# Patient Record
Sex: MALE | Race: WHITE | NOT HISPANIC OR LATINO
[De-identification: names, ages, dates, MRNs, and addresses within clinical notes are randomized per-mention and may not be internally consistent; named-entity substitution may affect disease eponyms.]

---

## 2017-09-27 ENCOUNTER — APPOINTMENT (OUTPATIENT)
Dept: DERMATOLOGY | Facility: CLINIC | Age: 57
End: 2017-09-27
Payer: COMMERCIAL

## 2017-09-27 PROCEDURE — 99202 OFFICE O/P NEW SF 15 MIN: CPT

## 2018-05-08 ENCOUNTER — EMERGENCY (EMERGENCY)
Facility: HOSPITAL | Age: 58
LOS: 1 days | Discharge: HOME | End: 2018-05-08
Attending: EMERGENCY MEDICINE
Payer: COMMERCIAL

## 2018-05-08 VITALS
RESPIRATION RATE: 16 BRPM | HEART RATE: 50 BPM | OXYGEN SATURATION: 100 % | SYSTOLIC BLOOD PRESSURE: 167 MMHG | DIASTOLIC BLOOD PRESSURE: 107 MMHG

## 2018-05-08 VITALS — WEIGHT: 244.93 LBS | HEIGHT: 72 IN

## 2018-05-08 LAB
ALBUMIN SERPL ELPH-MCNC: 4.3 G/DL — SIGNIFICANT CHANGE UP (ref 3.3–5.2)
ALP SERPL-CCNC: 51 U/L — SIGNIFICANT CHANGE UP (ref 40–120)
ALT FLD-CCNC: 14 U/L — SIGNIFICANT CHANGE UP
ANION GAP SERPL CALC-SCNC: 11 MMOL/L — SIGNIFICANT CHANGE UP (ref 5–17)
AST SERPL-CCNC: 13 U/L — SIGNIFICANT CHANGE UP
BASOPHILS # BLD AUTO: 0 K/UL — SIGNIFICANT CHANGE UP (ref 0–0.2)
BASOPHILS NFR BLD AUTO: 0.5 % — SIGNIFICANT CHANGE UP (ref 0–2)
BILIRUB SERPL-MCNC: 1.6 MG/DL — SIGNIFICANT CHANGE UP (ref 0.4–2)
BUN SERPL-MCNC: 11 MG/DL — SIGNIFICANT CHANGE UP (ref 8–20)
CALCIUM SERPL-MCNC: 9.2 MG/DL — SIGNIFICANT CHANGE UP (ref 8.6–10.2)
CHLORIDE SERPL-SCNC: 103 MMOL/L — SIGNIFICANT CHANGE UP (ref 98–107)
CO2 SERPL-SCNC: 27 MMOL/L — SIGNIFICANT CHANGE UP (ref 22–29)
CREAT SERPL-MCNC: 0.69 MG/DL — SIGNIFICANT CHANGE UP (ref 0.5–1.3)
EOSINOPHIL # BLD AUTO: 0.1 K/UL — SIGNIFICANT CHANGE UP (ref 0–0.5)
EOSINOPHIL NFR BLD AUTO: 2.3 % — SIGNIFICANT CHANGE UP (ref 0–5)
GLUCOSE SERPL-MCNC: 88 MG/DL — SIGNIFICANT CHANGE UP (ref 70–115)
HCT VFR BLD CALC: 48.4 % — SIGNIFICANT CHANGE UP (ref 42–52)
HGB BLD-MCNC: 16.4 G/DL — SIGNIFICANT CHANGE UP (ref 14–18)
LYMPHOCYTES # BLD AUTO: 2.2 K/UL — SIGNIFICANT CHANGE UP (ref 1–4.8)
LYMPHOCYTES # BLD AUTO: 35.1 % — SIGNIFICANT CHANGE UP (ref 20–55)
MCHC RBC-ENTMCNC: 30 PG — SIGNIFICANT CHANGE UP (ref 27–31)
MCHC RBC-ENTMCNC: 33.9 G/DL — SIGNIFICANT CHANGE UP (ref 32–36)
MCV RBC AUTO: 88.6 FL — SIGNIFICANT CHANGE UP (ref 80–94)
MONOCYTES # BLD AUTO: 0.6 K/UL — SIGNIFICANT CHANGE UP (ref 0–0.8)
MONOCYTES NFR BLD AUTO: 9.8 % — SIGNIFICANT CHANGE UP (ref 3–10)
NEUTROPHILS # BLD AUTO: 3.2 K/UL — SIGNIFICANT CHANGE UP (ref 1.8–8)
NEUTROPHILS NFR BLD AUTO: 52 % — SIGNIFICANT CHANGE UP (ref 37–73)
PLATELET # BLD AUTO: 173 K/UL — SIGNIFICANT CHANGE UP (ref 150–400)
POTASSIUM SERPL-MCNC: 4.6 MMOL/L — SIGNIFICANT CHANGE UP (ref 3.5–5.3)
POTASSIUM SERPL-SCNC: 4.6 MMOL/L — SIGNIFICANT CHANGE UP (ref 3.5–5.3)
PROT SERPL-MCNC: 6.4 G/DL — LOW (ref 6.6–8.7)
RBC # BLD: 5.46 M/UL — SIGNIFICANT CHANGE UP (ref 4.6–6.2)
RBC # FLD: 13.7 % — SIGNIFICANT CHANGE UP (ref 11–15.6)
SODIUM SERPL-SCNC: 141 MMOL/L — SIGNIFICANT CHANGE UP (ref 135–145)
WBC # BLD: 6.1 K/UL — SIGNIFICANT CHANGE UP (ref 4.8–10.8)
WBC # FLD AUTO: 6.1 K/UL — SIGNIFICANT CHANGE UP (ref 4.8–10.8)

## 2018-05-08 PROCEDURE — 70450 CT HEAD/BRAIN W/O DYE: CPT

## 2018-05-08 PROCEDURE — 99284 EMERGENCY DEPT VISIT MOD MDM: CPT

## 2018-05-08 PROCEDURE — 80053 COMPREHEN METABOLIC PANEL: CPT

## 2018-05-08 PROCEDURE — 70450 CT HEAD/BRAIN W/O DYE: CPT | Mod: 26

## 2018-05-08 PROCEDURE — 96374 THER/PROPH/DIAG INJ IV PUSH: CPT

## 2018-05-08 PROCEDURE — 85027 COMPLETE CBC AUTOMATED: CPT

## 2018-05-08 PROCEDURE — 36415 COLL VENOUS BLD VENIPUNCTURE: CPT

## 2018-05-08 PROCEDURE — 99284 EMERGENCY DEPT VISIT MOD MDM: CPT | Mod: 25

## 2018-05-08 RX ORDER — KETOROLAC TROMETHAMINE 30 MG/ML
30 SYRINGE (ML) INJECTION ONCE
Qty: 0 | Refills: 0 | Status: DISCONTINUED | OUTPATIENT
Start: 2018-05-08 | End: 2018-05-08

## 2018-05-08 RX ADMIN — Medication 30 MILLIGRAM(S): at 13:50

## 2018-05-08 RX ADMIN — Medication 10 MILLIGRAM(S): at 13:50

## 2018-05-08 NOTE — ED ADULT NURSE NOTE - OBJECTIVE STATEMENT
pt presents to ED with neck pain x one week radiating into head. pt states he currently takes pradaxa,. pt states he was seen at [facility] last night and walked out. pt states "they didn't even ask me if I wanted a pillow or blanket". pt hypertensive in triage. aime visual changes,  a&ox3. will continue to monitor and reassess

## 2018-05-08 NOTE — ED PROVIDER NOTE - NEUROLOGICAL, MLM
No focal deficits, no motor or sensory deficits. Normal strength and sensation. Normal finger to nose test.

## 2018-05-08 NOTE — ED PROVIDER NOTE - OBJECTIVE STATEMENT
58 y/o M with PMHx of a-fib and HTN presents to ED c/o constant headache in the occipital region and neck pain onset 1 week ago, Describes the neck pain as a pulled muscle feeling. He states "normally I would not come to the ED for a headache but since I am on Pradaxa I wanted to get evaluated". Has been taking Ibuprofen with relief and movement makes it feel better. Denies neck or head trauma, visual changes, sensitivity to loud noises, tinnitus, photophobia, fevers, chills, numbness/weakness/tingling. Takes blood pressure medications and states his PMD states his BP has been WNL for the past few months. No smoking, EtOH use or illicit drug use. No further acute complaints at this time.

## 2018-05-08 NOTE — ED PROVIDER NOTE - CARE PLAN
Principal Discharge DX:	Acute nonintractable headache, unspecified headache type  Secondary Diagnosis:	Essential hypertension

## 2018-05-08 NOTE — ED PROVIDER NOTE - CHPI ED SYMPTOMS NEG
no blurred vision/neck or head trauma, sensitivity to loud noise, photophobia, tinnitus, fevers, chills, numbness/weakness/tingling

## 2018-05-08 NOTE — ED PROVIDER NOTE - MEDICAL DECISION MAKING DETAILS
CTH neg, pt states headache resolved, well appearing, neuro intact, and has appt tomorrow with pmd for bp mgt. Stable for dc

## 2018-05-08 NOTE — ED ADULT TRIAGE NOTE - CHIEF COMPLAINT QUOTE
Patient arrived to ED today with c/o headache for about one week and high blood pressure.  Patient states headache is about at the base of his skull and he takes blood thinners for afib and is concerned.

## 2018-05-08 NOTE — ED STATDOCS - OBJECTIVE STATEMENT
Pt. present to ED c/o persistent headache x 1 week. Pt. is on Pradaxa. Pt. also is hypertensive today. Pt. had some relief with Ibuprofen, but headache is not going away. Pt. was concerned due to the fact that he is on a blood thinner and thus came to the ED. Pt. will be sent to the main room for further evaluation.

## 2018-09-04 PROBLEM — I10 ESSENTIAL (PRIMARY) HYPERTENSION: Chronic | Status: ACTIVE | Noted: 2018-05-08

## 2018-09-04 PROBLEM — I48.91 UNSPECIFIED ATRIAL FIBRILLATION: Chronic | Status: ACTIVE | Noted: 2018-05-08

## 2018-10-10 ENCOUNTER — APPOINTMENT (OUTPATIENT)
Dept: CARDIOLOGY | Facility: CLINIC | Age: 58
End: 2018-10-10

## 2018-12-28 ENCOUNTER — APPOINTMENT (OUTPATIENT)
Dept: CARDIOLOGY | Facility: CLINIC | Age: 58
End: 2018-12-28

## 2019-11-01 ENCOUNTER — INPATIENT (INPATIENT)
Facility: HOSPITAL | Age: 59
LOS: 5 days | Discharge: HOME | DRG: 417 | End: 2019-11-07
Attending: SURGERY | Admitting: SURGERY
Payer: COMMERCIAL

## 2019-11-01 VITALS
TEMPERATURE: 97 F | DIASTOLIC BLOOD PRESSURE: 103 MMHG | HEART RATE: 53 BPM | WEIGHT: 214.95 LBS | SYSTOLIC BLOOD PRESSURE: 177 MMHG | HEIGHT: 72 IN | RESPIRATION RATE: 20 BRPM

## 2019-11-01 LAB
ALBUMIN SERPL ELPH-MCNC: 4.3 G/DL — SIGNIFICANT CHANGE UP (ref 3.3–5.2)
ALP SERPL-CCNC: 266 U/L — HIGH (ref 40–120)
ALT FLD-CCNC: 467 U/L — HIGH
ANION GAP SERPL CALC-SCNC: 13 MMOL/L — SIGNIFICANT CHANGE UP (ref 5–17)
AST SERPL-CCNC: 445 U/L — HIGH
BASOPHILS # BLD AUTO: 0.06 K/UL — SIGNIFICANT CHANGE UP (ref 0–0.2)
BASOPHILS NFR BLD AUTO: 0.5 % — SIGNIFICANT CHANGE UP (ref 0–2)
BILIRUB SERPL-MCNC: 4.4 MG/DL — HIGH (ref 0.4–2)
BUN SERPL-MCNC: 12 MG/DL — SIGNIFICANT CHANGE UP (ref 8–20)
CALCIUM SERPL-MCNC: 9.4 MG/DL — SIGNIFICANT CHANGE UP (ref 8.6–10.2)
CHLORIDE SERPL-SCNC: 99 MMOL/L — SIGNIFICANT CHANGE UP (ref 98–107)
CO2 SERPL-SCNC: 26 MMOL/L — SIGNIFICANT CHANGE UP (ref 22–29)
CREAT SERPL-MCNC: 0.67 MG/DL — SIGNIFICANT CHANGE UP (ref 0.5–1.3)
EOSINOPHIL # BLD AUTO: 0.19 K/UL — SIGNIFICANT CHANGE UP (ref 0–0.5)
EOSINOPHIL NFR BLD AUTO: 1.5 % — SIGNIFICANT CHANGE UP (ref 0–6)
GLUCOSE SERPL-MCNC: 110 MG/DL — SIGNIFICANT CHANGE UP (ref 70–115)
HCT VFR BLD CALC: 51.1 % — HIGH (ref 39–50)
HGB BLD-MCNC: 16.9 G/DL — SIGNIFICANT CHANGE UP (ref 13–17)
IMM GRANULOCYTES NFR BLD AUTO: 0.6 % — SIGNIFICANT CHANGE UP (ref 0–1.5)
LIDOCAIN IGE QN: >3000 U/L — HIGH (ref 22–51)
LYMPHOCYTES # BLD AUTO: 1.94 K/UL — SIGNIFICANT CHANGE UP (ref 1–3.3)
LYMPHOCYTES # BLD AUTO: 15.4 % — SIGNIFICANT CHANGE UP (ref 13–44)
MCHC RBC-ENTMCNC: 29.7 PG — SIGNIFICANT CHANGE UP (ref 27–34)
MCHC RBC-ENTMCNC: 33.1 GM/DL — SIGNIFICANT CHANGE UP (ref 32–36)
MCV RBC AUTO: 89.8 FL — SIGNIFICANT CHANGE UP (ref 80–100)
MONOCYTES # BLD AUTO: 1.01 K/UL — HIGH (ref 0–0.9)
MONOCYTES NFR BLD AUTO: 8 % — SIGNIFICANT CHANGE UP (ref 2–14)
NEUTROPHILS # BLD AUTO: 9.29 K/UL — HIGH (ref 1.8–7.4)
NEUTROPHILS NFR BLD AUTO: 74 % — SIGNIFICANT CHANGE UP (ref 43–77)
PLATELET # BLD AUTO: 277 K/UL — SIGNIFICANT CHANGE UP (ref 150–400)
POTASSIUM SERPL-MCNC: 4.1 MMOL/L — SIGNIFICANT CHANGE UP (ref 3.5–5.3)
POTASSIUM SERPL-SCNC: 4.1 MMOL/L — SIGNIFICANT CHANGE UP (ref 3.5–5.3)
PROT SERPL-MCNC: 7 G/DL — SIGNIFICANT CHANGE UP (ref 6.6–8.7)
RBC # BLD: 5.69 M/UL — SIGNIFICANT CHANGE UP (ref 4.2–5.8)
RBC # FLD: 13.1 % — SIGNIFICANT CHANGE UP (ref 10.3–14.5)
SODIUM SERPL-SCNC: 138 MMOL/L — SIGNIFICANT CHANGE UP (ref 135–145)
WBC # BLD: 12.56 K/UL — HIGH (ref 3.8–10.5)
WBC # FLD AUTO: 12.56 K/UL — HIGH (ref 3.8–10.5)

## 2019-11-01 PROCEDURE — 99285 EMERGENCY DEPT VISIT HI MDM: CPT

## 2019-11-01 PROCEDURE — 76705 ECHO EXAM OF ABDOMEN: CPT | Mod: 26

## 2019-11-01 RX ORDER — HYDROMORPHONE HYDROCHLORIDE 2 MG/ML
1 INJECTION INTRAMUSCULAR; INTRAVENOUS; SUBCUTANEOUS ONCE
Refills: 0 | Status: DISCONTINUED | OUTPATIENT
Start: 2019-11-01 | End: 2019-11-01

## 2019-11-01 RX ORDER — ONDANSETRON 8 MG/1
4 TABLET, FILM COATED ORAL ONCE
Refills: 0 | Status: COMPLETED | OUTPATIENT
Start: 2019-11-01 | End: 2019-11-01

## 2019-11-01 RX ORDER — MORPHINE SULFATE 50 MG/1
4 CAPSULE, EXTENDED RELEASE ORAL ONCE
Refills: 0 | Status: DISCONTINUED | OUTPATIENT
Start: 2019-11-01 | End: 2019-11-01

## 2019-11-01 RX ORDER — SODIUM CHLORIDE 9 MG/ML
1000 INJECTION INTRAMUSCULAR; INTRAVENOUS; SUBCUTANEOUS ONCE
Refills: 0 | Status: COMPLETED | OUTPATIENT
Start: 2019-11-01 | End: 2019-11-01

## 2019-11-01 RX ORDER — PIPERACILLIN AND TAZOBACTAM 4; .5 G/20ML; G/20ML
3.38 INJECTION, POWDER, LYOPHILIZED, FOR SOLUTION INTRAVENOUS ONCE
Refills: 0 | Status: COMPLETED | OUTPATIENT
Start: 2019-11-01 | End: 2019-11-01

## 2019-11-01 RX ADMIN — HYDROMORPHONE HYDROCHLORIDE 1 MILLIGRAM(S): 2 INJECTION INTRAMUSCULAR; INTRAVENOUS; SUBCUTANEOUS at 23:33

## 2019-11-01 RX ADMIN — ONDANSETRON 4 MILLIGRAM(S): 8 TABLET, FILM COATED ORAL at 22:15

## 2019-11-01 RX ADMIN — PIPERACILLIN AND TAZOBACTAM 200 GRAM(S): 4; .5 INJECTION, POWDER, LYOPHILIZED, FOR SOLUTION INTRAVENOUS at 23:33

## 2019-11-01 RX ADMIN — SODIUM CHLORIDE 1000 MILLILITER(S): 9 INJECTION INTRAMUSCULAR; INTRAVENOUS; SUBCUTANEOUS at 23:02

## 2019-11-01 RX ADMIN — MORPHINE SULFATE 4 MILLIGRAM(S): 50 CAPSULE, EXTENDED RELEASE ORAL at 22:15

## 2019-11-01 NOTE — ED STATDOCS - CLINICAL SUMMARY MEDICAL DECISION MAKING FREE TEXT BOX
60 y/o M pt presents to ED c/o RUQ ABD pain with RUQ tenderness on exam. Will check basic labs, fluids, ABD US and reeval.

## 2019-11-01 NOTE — ED STATDOCS - OBJECTIVE STATEMENT
60 y/o M pt with hx of HTN, afib; PSHx hernia repair presents to ED c/o progressive severe RUQ ABD pain, which radiates to his right flank x 6 days.  Pain worsened today and presents to ED. Had similar pain 1 month ago, which resolved without intervention. Had o/p blood work yesterday and ABD US today. No nausea, vomiting, fever or chills. No further complaints at this time.

## 2019-11-01 NOTE — ED STATDOCS - NS ED ROS FT
No fever/chills, No photophobia/eye pain/changes in vision, No ear pain/sore throat/dysphagia, No chest pain/palpitations, no SOB/cough/wheeze/stridor,  +abdominal pain, No N/V/D, no dysuria/frequency/discharge, No neck/back pain, no rash, no changes in neurological status/function.

## 2019-11-01 NOTE — ED STATDOCS - PROGRESS NOTE DETAILS
PA NOTE: PT evaluated by intake physician. HPI/PE/ROS as noted above. Will follow up plan per intake physician PA NOTE: choledocholithiasis with possible cholecystitis, attempted to consult ACS did not answer x 2 PA NOTE: gallstone pancreatitis with transaminitis, elevated bilirubin and lipase >3000, attempted to consult ACS did not answer x 2 PA NOTE: Spoke to ACS will consult patient PA NOTE: admit to surgery, will order stat ct with iv contrast to examine the extent of pancreatitis

## 2019-11-01 NOTE — ED STATDOCS - PHYSICAL EXAMINATION
Constitutional - well-developed; well nourished. Head - NCAT. Airway patent. Eyes - PERRL. CV - RRR. no murmur. no edema. Pulm - CTAB. Abd - soft, + diffuse ABD tenderness, worse in RUQ, +guarding. No rebound Neuro - A&Ox3. strength 5/5 x4. sensation intact x4. normal gait. Skin - No rash. MSK - normal ROM.

## 2019-11-01 NOTE — ED ADULT TRIAGE NOTE - CHIEF COMPLAINT QUOTE
patient states that he has had abdominal pain radiating to right flank and back  for past 2 weeks and today started having nausea with increased pain was seen by PMD

## 2019-11-01 NOTE — ED ADULT NURSE REASSESSMENT NOTE - NS ED NURSE REASSESS COMMENT FT1
Report recvd from off going RN, pt recvd sitting on chair, appears uncomfortable, pt returned to unit from test, reporting abd pain as returned and 10/10, POC discussed with pt who verbalize understanding and agree, pt awaiting test results, pt findings reported to ACP. awaiting further orders.

## 2019-11-01 NOTE — ED STATDOCS - ATTENDING CONTRIBUTION TO CARE
I performed a face to face history and physical exam of the patient and discussed their management with the resident/ACP. I reviewed the resident/ACP's note and agree with the documented findings and plan of care.    Pt with gallstone pancreatitis/choledocholithiasis.  will admit to surgery.

## 2019-11-02 DIAGNOSIS — Z79.01 LONG TERM (CURRENT) USE OF ANTICOAGULANTS: ICD-10-CM

## 2019-11-02 DIAGNOSIS — D68.62 LUPUS ANTICOAGULANT SYNDROME: ICD-10-CM

## 2019-11-02 DIAGNOSIS — K85.10 BILIARY ACUTE PANCREATITIS WITHOUT NECROSIS OR INFECTION: ICD-10-CM

## 2019-11-02 DIAGNOSIS — I48.0 PAROXYSMAL ATRIAL FIBRILLATION: ICD-10-CM

## 2019-11-02 DIAGNOSIS — R74.8 ABNORMAL LEVELS OF OTHER SERUM ENZYMES: ICD-10-CM

## 2019-11-02 LAB
ALBUMIN SERPL ELPH-MCNC: 4.1 G/DL — SIGNIFICANT CHANGE UP (ref 3.3–5.2)
ALP SERPL-CCNC: 241 U/L — HIGH (ref 40–120)
ALT FLD-CCNC: 374 U/L — HIGH
ANION GAP SERPL CALC-SCNC: 10 MMOL/L — SIGNIFICANT CHANGE UP (ref 5–17)
APTT BLD: 36.5 SEC — HIGH (ref 27.5–36.3)
APTT BLD: 45.9 SEC — HIGH (ref 27.5–36.3)
AST SERPL-CCNC: 223 U/L — HIGH
BASOPHILS # BLD AUTO: 0.03 K/UL — SIGNIFICANT CHANGE UP (ref 0–0.2)
BASOPHILS NFR BLD AUTO: 0.3 % — SIGNIFICANT CHANGE UP (ref 0–2)
BILIRUB DIRECT SERPL-MCNC: 1 MG/DL — HIGH (ref 0–0.3)
BILIRUB INDIRECT FLD-MCNC: 2.1 MG/DL — HIGH (ref 0.2–1)
BILIRUB SERPL-MCNC: 3.1 MG/DL — HIGH (ref 0.4–2)
BUN SERPL-MCNC: 11 MG/DL — SIGNIFICANT CHANGE UP (ref 8–20)
CALCIUM SERPL-MCNC: 8.8 MG/DL — SIGNIFICANT CHANGE UP (ref 8.6–10.2)
CHLORIDE SERPL-SCNC: 100 MMOL/L — SIGNIFICANT CHANGE UP (ref 98–107)
CO2 SERPL-SCNC: 28 MMOL/L — SIGNIFICANT CHANGE UP (ref 22–29)
CREAT SERPL-MCNC: 0.69 MG/DL — SIGNIFICANT CHANGE UP (ref 0.5–1.3)
EOSINOPHIL # BLD AUTO: 0.1 K/UL — SIGNIFICANT CHANGE UP (ref 0–0.5)
EOSINOPHIL NFR BLD AUTO: 0.9 % — SIGNIFICANT CHANGE UP (ref 0–6)
GLUCOSE SERPL-MCNC: 125 MG/DL — HIGH (ref 70–115)
HCT VFR BLD CALC: 47 % — SIGNIFICANT CHANGE UP (ref 39–50)
HCT VFR BLD CALC: 48.3 % — SIGNIFICANT CHANGE UP (ref 39–50)
HGB BLD-MCNC: 16 G/DL — SIGNIFICANT CHANGE UP (ref 13–17)
HGB BLD-MCNC: 16.3 G/DL — SIGNIFICANT CHANGE UP (ref 13–17)
IMM GRANULOCYTES NFR BLD AUTO: 0.6 % — SIGNIFICANT CHANGE UP (ref 0–1.5)
INR BLD: 1.08 RATIO — SIGNIFICANT CHANGE UP (ref 0.88–1.16)
LIDOCAIN IGE QN: 2416 U/L — HIGH (ref 22–51)
LYMPHOCYTES # BLD AUTO: 1.9 K/UL — SIGNIFICANT CHANGE UP (ref 1–3.3)
LYMPHOCYTES # BLD AUTO: 17.8 % — SIGNIFICANT CHANGE UP (ref 13–44)
MAGNESIUM SERPL-MCNC: 1.8 MG/DL — SIGNIFICANT CHANGE UP (ref 1.6–2.6)
MCHC RBC-ENTMCNC: 29.8 PG — SIGNIFICANT CHANGE UP (ref 27–34)
MCHC RBC-ENTMCNC: 30 PG — SIGNIFICANT CHANGE UP (ref 27–34)
MCHC RBC-ENTMCNC: 33.7 GM/DL — SIGNIFICANT CHANGE UP (ref 32–36)
MCHC RBC-ENTMCNC: 34 GM/DL — SIGNIFICANT CHANGE UP (ref 32–36)
MCV RBC AUTO: 88 FL — SIGNIFICANT CHANGE UP (ref 80–100)
MCV RBC AUTO: 88.3 FL — SIGNIFICANT CHANGE UP (ref 80–100)
MONOCYTES # BLD AUTO: 0.78 K/UL — SIGNIFICANT CHANGE UP (ref 0–0.9)
MONOCYTES NFR BLD AUTO: 7.3 % — SIGNIFICANT CHANGE UP (ref 2–14)
NEUTROPHILS # BLD AUTO: 7.8 K/UL — HIGH (ref 1.8–7.4)
NEUTROPHILS NFR BLD AUTO: 73.1 % — SIGNIFICANT CHANGE UP (ref 43–77)
PLATELET # BLD AUTO: 231 K/UL — SIGNIFICANT CHANGE UP (ref 150–400)
PLATELET # BLD AUTO: 259 K/UL — SIGNIFICANT CHANGE UP (ref 150–400)
POTASSIUM SERPL-MCNC: 4.6 MMOL/L — SIGNIFICANT CHANGE UP (ref 3.5–5.3)
POTASSIUM SERPL-SCNC: 4.6 MMOL/L — SIGNIFICANT CHANGE UP (ref 3.5–5.3)
PROT SERPL-MCNC: 6.2 G/DL — LOW (ref 6.6–8.7)
PROTHROM AB SERPL-ACNC: 12.5 SEC — SIGNIFICANT CHANGE UP (ref 10–12.9)
RBC # BLD: 5.34 M/UL — SIGNIFICANT CHANGE UP (ref 4.2–5.8)
RBC # BLD: 5.47 M/UL — SIGNIFICANT CHANGE UP (ref 4.2–5.8)
RBC # FLD: 13.2 % — SIGNIFICANT CHANGE UP (ref 10.3–14.5)
RBC # FLD: 13.2 % — SIGNIFICANT CHANGE UP (ref 10.3–14.5)
SODIUM SERPL-SCNC: 138 MMOL/L — SIGNIFICANT CHANGE UP (ref 135–145)
WBC # BLD: 10.67 K/UL — HIGH (ref 3.8–10.5)
WBC # BLD: 16.01 K/UL — HIGH (ref 3.8–10.5)
WBC # FLD AUTO: 10.67 K/UL — HIGH (ref 3.8–10.5)
WBC # FLD AUTO: 16.01 K/UL — HIGH (ref 3.8–10.5)

## 2019-11-02 PROCEDURE — 99223 1ST HOSP IP/OBS HIGH 75: CPT

## 2019-11-02 PROCEDURE — 74181 MRI ABDOMEN W/O CONTRAST: CPT | Mod: 26

## 2019-11-02 PROCEDURE — 74177 CT ABD & PELVIS W/CONTRAST: CPT | Mod: 26

## 2019-11-02 PROCEDURE — 71045 X-RAY EXAM CHEST 1 VIEW: CPT | Mod: 26

## 2019-11-02 PROCEDURE — 99222 1ST HOSP IP/OBS MODERATE 55: CPT

## 2019-11-02 PROCEDURE — 93010 ELECTROCARDIOGRAM REPORT: CPT

## 2019-11-02 RX ORDER — ONDANSETRON 8 MG/1
4 TABLET, FILM COATED ORAL EVERY 4 HOURS
Refills: 0 | Status: DISCONTINUED | OUTPATIENT
Start: 2019-11-02 | End: 2019-11-07

## 2019-11-02 RX ORDER — MAGNESIUM SULFATE 500 MG/ML
2 VIAL (ML) INJECTION ONCE
Refills: 0 | Status: COMPLETED | OUTPATIENT
Start: 2019-11-02 | End: 2019-11-03

## 2019-11-02 RX ORDER — HEPARIN SODIUM 5000 [USP'U]/ML
INJECTION INTRAVENOUS; SUBCUTANEOUS
Qty: 25000 | Refills: 0 | Status: DISCONTINUED | OUTPATIENT
Start: 2019-11-02 | End: 2019-11-04

## 2019-11-02 RX ORDER — SODIUM CHLORIDE 9 MG/ML
1000 INJECTION INTRAMUSCULAR; INTRAVENOUS; SUBCUTANEOUS ONCE
Refills: 0 | Status: DISCONTINUED | OUTPATIENT
Start: 2019-11-02 | End: 2019-11-04

## 2019-11-02 RX ORDER — ENOXAPARIN SODIUM 100 MG/ML
40 INJECTION SUBCUTANEOUS DAILY
Refills: 0 | Status: DISCONTINUED | OUTPATIENT
Start: 2019-11-02 | End: 2019-11-02

## 2019-11-02 RX ORDER — MORPHINE SULFATE 50 MG/1
1 CAPSULE, EXTENDED RELEASE ORAL EVERY 4 HOURS
Refills: 0 | Status: DISCONTINUED | OUTPATIENT
Start: 2019-11-02 | End: 2019-11-06

## 2019-11-02 RX ORDER — SODIUM CHLORIDE 9 MG/ML
1000 INJECTION, SOLUTION INTRAVENOUS
Refills: 0 | Status: DISCONTINUED | OUTPATIENT
Start: 2019-11-02 | End: 2019-11-07

## 2019-11-02 RX ORDER — MORPHINE SULFATE 50 MG/1
2 CAPSULE, EXTENDED RELEASE ORAL EVERY 4 HOURS
Refills: 0 | Status: DISCONTINUED | OUTPATIENT
Start: 2019-11-02 | End: 2019-11-07

## 2019-11-02 RX ADMIN — ONDANSETRON 4 MILLIGRAM(S): 8 TABLET, FILM COATED ORAL at 22:26

## 2019-11-02 RX ADMIN — HEPARIN SODIUM 2000 UNIT(S)/HR: 5000 INJECTION INTRAVENOUS; SUBCUTANEOUS at 18:41

## 2019-11-02 RX ADMIN — SODIUM CHLORIDE 125 MILLILITER(S): 9 INJECTION, SOLUTION INTRAVENOUS at 08:25

## 2019-11-02 RX ADMIN — MORPHINE SULFATE 2 MILLIGRAM(S): 50 CAPSULE, EXTENDED RELEASE ORAL at 09:49

## 2019-11-02 RX ADMIN — HYDROMORPHONE HYDROCHLORIDE 1 MILLIGRAM(S): 2 INJECTION INTRAMUSCULAR; INTRAVENOUS; SUBCUTANEOUS at 00:05

## 2019-11-02 RX ADMIN — HEPARIN SODIUM 1800 UNIT(S)/HR: 5000 INJECTION INTRAVENOUS; SUBCUTANEOUS at 10:45

## 2019-11-02 RX ADMIN — MORPHINE SULFATE 2 MILLIGRAM(S): 50 CAPSULE, EXTENDED RELEASE ORAL at 22:25

## 2019-11-02 RX ADMIN — ONDANSETRON 4 MILLIGRAM(S): 8 TABLET, FILM COATED ORAL at 09:49

## 2019-11-02 RX ADMIN — SODIUM CHLORIDE 125 MILLILITER(S): 9 INJECTION, SOLUTION INTRAVENOUS at 02:30

## 2019-11-02 RX ADMIN — MORPHINE SULFATE 2 MILLIGRAM(S): 50 CAPSULE, EXTENDED RELEASE ORAL at 22:49

## 2019-11-02 RX ADMIN — SODIUM CHLORIDE 125 MILLILITER(S): 9 INJECTION, SOLUTION INTRAVENOUS at 03:00

## 2019-11-02 RX ADMIN — ONDANSETRON 4 MILLIGRAM(S): 8 TABLET, FILM COATED ORAL at 03:35

## 2019-11-02 NOTE — CONSULT NOTE ADULT - ASSESSMENT
60 y/o male PMH Paroxysmal AFib, Lupus anticoagulant on Pradaxa, HLD, HTN presents to ED for RUQ pain. U/S abd shows cholelithiasis; CT abd pelvis showed acute pancreatitis,  labs lipase >3000. Possible MRCP tomorrow. Cardiology called for evaluation of Afib on Pradaxa/ risk stratification for possible lap jennifer once pancreatitis resolved.      Afib  - Paroxysmal A fib  - SB on EKG, non ischemic   - On Pradaxa for Afib and Lupus anticoagulant   - For Afib- Ok to hold Pradaxa for pending surgery and restart when able;  recommend hematology consult regarding stopping Pradaxa given + Lupus anticoagulant    Pre-operative cardiovascular risk evaluation and management.   - No cardiac symptoms. Non-ischemic ECG. METs > 4.  RCRI (revised cardiac risk index score) < 1%.   - No further cardiac work up is needed.  Benefits of surgery outweigh the risk. Further cardiac work up will not change risk of the patient. Proceed for surgery as indicated.  - Cardiologist- Dr Luigi Cade. - [facility] at [facility] approximately 1 year ago. 60 y/o male PMH Paroxysmal AFib, Lupus anticoagulant on Pradaxa, DVT, PE,  HLD, HTN presents to ED for RUQ pain. U/S abd shows cholelithiasis; CT abd pelvis showed acute pancreatitis,  labs lipase >3000. Possible MRCP tomorrow. Cardiology called for evaluation of Afib on Pradaxa/ risk stratification for possible lap jennifer once pancreatitis resolved.      Afib  - Paroxysmal A fib  - SB on EKG, non ischemic   - On Pradaxa for Afib and Lupus anticoagulant   - Continue heparin till surgery, restart pradaxa when stable      Pre-operative cardiovascular risk evaluation and management.   - No cardiac symptoms. Non-ischemic ECG. METs > 4.  RCRI (revised cardiac risk index score) < 1%.   - No further cardiac work up is needed.  Benefits of surgery outweigh the risk. Further cardiac work up will not change risk of the patient. Proceed for surgery as indicated.  - Cardiologist- Dr Luigi Cade. - [facility] at [facility] approximately 1 year ago.

## 2019-11-02 NOTE — ED ADULT NURSE REASSESSMENT NOTE - NS ED NURSE REASSESS COMMENT FT1
Report given to accepting holding room RN, POC discussed with pt who verbalize understanding and agree, pt left unit A&Ox4, offered no complaints, VSS, safety maintained.

## 2019-11-02 NOTE — CONSULT NOTE ADULT - PROBLEM SELECTOR RECOMMENDATION 9
Lab values improving; possible passed stone.  No obstruction or dilatation seen on CT.  Recommend MRCP for further evaluation.  If MRCP positive, will plan on ERCP.  Timing of cholecystectomy to be determined by surgical service.

## 2019-11-02 NOTE — CONSULT NOTE ADULT - SUBJECTIVE AND OBJECTIVE BOX
HISTORY OF PRESENT ILLNESS: This is a 59-year-old man with a past medical history significant for atrial fibrillation, lupus anticoagulant, and active tobacco use who presented with complaints of RUQ and epigastric pain that has been worsening over the past few days.  He reports similar episodes over the past few months that have self-resolved.  He reports being told he had gallstones in the past that were identified during a TTE.  He denies any fevers or chills.  He reports some nausea without vomiting.  No changes in bowel habits.  He has never undergone an EGD or colonoscopy in the past.  He underwent an ultrasound while in the ED that showed a mildly distended gallbladder, gallstones and a mildly dilated CBD.  A subsequent CT A/P showed gallstones, no CBD dilatation or choledocholithiasis and moderately severe acute pancreatitis.  His liver enzymes were grossly elevated and his lipase was >3000 on presentation.    ROS: A 14-point review of systems was completed and was otherwise negative save what was reported in the HPI.    PAST MEDICAL/SURGICAL HISTORY:  Hypertension  A-fib  No significant past surgical history    SOCIAL HISTORY:  - TOBACCO: Active every day smoker  - ALCOHOL: Denies  - ILLICIT DRUG USE: Denies    FAMILY HISTORY:  No known history of gastrointestinal or liver disease;      HOME MEDICATIONS:  Aspir 81:  (08 May 2018 09:29)  Bystolic:  (08 May 2018 09:29)  enalapril:  (08 May 2018 09:29)  Pradaxa:  (08 May 2018 09:29)    INPATIENT MEDICATIONS:  MEDICATIONS  (STANDING):  heparin  Infusion.  Unit(s)/Hr (18 mL/Hr) IV Continuous <Continuous>  lactated ringers. 1000 milliLiter(s) (125 mL/Hr) IV Continuous <Continuous>  magnesium sulfate  IVPB 2 Gram(s) IV Intermittent once  sodium chloride 0.9% Bolus 1000 milliLiter(s) IV Bolus once    MEDICATIONS  (PRN):  morphine  - Injectable 2 milliGRAM(s) IV Push every 4 hours PRN Severe Pain (7 - 10)  morphine  - Injectable 1 milliGRAM(s) IV Push every 4 hours PRN Moderate Pain (4 - 6)  ondansetron Injectable 4 milliGRAM(s) IV Push every 4 hours PRN Nausea and/or Vomiting    ALLERGIES:  No Known Allergies    T(C): 36.5 (11-02-19 @ 08:27), Max: 36.5 (11-02-19 @ 08:27)  HR: 58 (11-02-19 @ 08:27) (50 - 58)  BP: 146/88 (11-02-19 @ 08:27) (111/79 - 177/103)  RR: 18 (11-02-19 @ 08:27) (18 - 20)  SpO2: 96% (11-02-19 @ 08:27) (94% - 96%)        LABS:                        16.3   10.67 )-----------( 231      ( 02 Nov 2019 07:35 )             48.3     PT/INR - ( 02 Nov 2019 11:28 )   PT: 12.5 sec;   INR: 1.08 ratio         PTT - ( 02 Nov 2019 09:09 )  PTT:36.5 sec  11-02    138  |  100  |  11.0  ----------------------------<  125<H>  4.6   |  28.0  |  0.69    Ca    8.8      02 Nov 2019 07:35  Mg     1.8     11-02    TPro  6.2<L>  /  Alb  4.1  /  TBili  3.1<H>  /  DBili  1.0<H>  /  AST  223<H>  /  ALT  374<H>  /  AlkPhos  241<H>  11-02    LIVER FUNCTIONS - ( 02 Nov 2019 07:35 )  Alb: 4.1 g/dL / Pro: 6.2 g/dL / ALK PHOS: 241 U/L / ALT: 374 U/L / AST: 223 U/L / GGT: x             Lipase, Serum: 2416 U/L (11-02-19 @ 07:35)  Lipase, Serum: >3000 U/L (11-01-19 @ 22:19)        IMAGING: I personally reviewed the CT A/P, and I agree with the radiologist's interpretation as described below:    < from: CT Abdomen and Pelvis w/ IV Cont (11.02.19 @ 04:54) >    FINDINGS:   Lungs: Bibasilar subsegmental atelectasis.     Liver: Normal. No mass.   Gallbladder and bile ducts: Cholelithiasis. No cholecystitis or biliary ductal dilatation.   Pancreas: Diffuse peripancreatic inflammation extending into the anterior pararenal space, compatible with acute pancreatitis.   Spleen: Normal. No splenomegaly.   Adrenals: Normal. No mass.   Kidneys and ureters: Round hypodense lesion of the right kidney does not meet strict CT criteria for a simple cyst and is indeterminate, potentially a hyperdense cyst. It measures 5.2 cm and 22 Hounsfield units. Kidneys otherwise unremarkable.   Stomach and bowel: Colonic diverticulosis. No diverticulitis. No bowel wall thickening or intestinal obstruction.   Appendix: Normal appendix.   Intraperitoneal space: Unremarkable. No free air. No significant fluid collection.   Vasculature: Unremarkable. No abdominal aortic aneurysm.   Lymph nodes: Unremarkable. No enlarged lymph nodes.     Bladder: Unremarkable as visualized.   Reproductive: Prostatomegaly.   Bones/joints: Unremarkable. No acute fracture.   Soft tissues: Left inguinal hernia containing fat only.     IMPRESSION:   Diffuse peripancreatic inflammation extending into the anterior pararenal space, compatible with acute pancreatitis.    < end of copied text >

## 2019-11-02 NOTE — CONSULT NOTE ADULT - ATTENDING COMMENTS
59 y/p male with paroxysmal AFib,lupus anticoagulant, DVT x 2 ( one unprovoked) presenting with RUQ pain and diagnosis of Acute pancreatitis and choledolithiasis  Recommend bridging with IV unfractionated heparin prior to need for any invasive procedure and resume when safe from surgery/procedure standpoint. (due to multiple DVT/PE, unprovoked one time and lupus anticoagulant)   Low risk for perioperative cardiac events given good functional status , RCRI 1%

## 2019-11-02 NOTE — H&P ADULT - PROBLEM SELECTOR PLAN 1
1. Admit to ACS/Trauma under Dr. Burkett   2. Stat CT A/P with IV contrast ordered   3. NPO, IVFs and IV pain control and anti-emetics available   4. Confirm home dosages with [facility] in [facility]    5. GI following and MRCP requested 1. Admit to ACS/Trauma under Dr. Burkett   2. Stat CT A/P with IV contrast ordered   3. NPO, IVFs and IV pain control and anti-emetics available   4. Confirm home dosages with [facility] in [facility]    5. GI following and MRCP requested and ordered to be done in the AM

## 2019-11-02 NOTE — PROGRESS NOTE ADULT - SUBJECTIVE AND OBJECTIVE BOX
Still has some p[ain slightly less  afebrile  abd soft upper abd tenderness no guarding or rebound  LFTs and lipase trending down  MRCP   Supportive care

## 2019-11-02 NOTE — H&P ADULT - NSHPPHYSICALEXAM_GEN_ALL_CORE
Constitutional: Patient resting comfortably in bed in no acute distress   HEENT: EOMI/PERRL b/l with no scleral icterus   Neck: No JVD, full ROM w/o pain  Respiratory: CTAB with unlabored respirations, no accessory muscle use or conversational dyspnea   Cardiovascular: Regular rate and rhythm with no arrythmias or murmurs   Gastrointestinal: Abdomen soft, non-tender, non-distended with no rebound tenderness or guarding   Rectal: Not indicated   Neurological: GCS 15 ([id number]) with no gross sensory, motor or coordination deficits   Psychiatric: Normal mood and affect   Musculoskeletal: No joint pain, swelling or deformity with no limitation of movement

## 2019-11-02 NOTE — H&P ADULT - ASSESSMENT
59 year old with working diagnosis gallstone pancreatitis currently afebrile and hemodynamically stable pending CT A/P with IV contrast to evaluate severity of pancreatitis on imaging

## 2019-11-02 NOTE — CONSULT NOTE ADULT - GASTROINTESTINAL DETAILS
no guarding/no rebound tenderness/no rigidity/no distention/soft/no organomegaly/no masses palpable/bowel sounds normal

## 2019-11-02 NOTE — H&P ADULT - HISTORY OF PRESENT ILLNESS
59 year old male presenting with chief complaint of RUQ and epigastric pain x6 days. Reports that he had similar episode 2 months ago that lasted an hour that recurred 1 month ago and lasted 2-3hrs. Describes pain as dull, constant, non radiating pain rated 4/10 that started last Saturday prompting him to report to his PCP Tuesday who ordered labs and sent him for a RUQ U/S that he was supposed to f/u results on this coming Monday but his pain acutely worsened which resulted in him coming to [facility] ED. When evaluated by surgery team, patient had received 8mg morphine and was not reporting any pain. ACS/Trauma consulted given U/S findings of gallstones, dilated CBD and lipase >3000. ED contacted on call GI (Dr. Denis) who recommended MRCP in the AM. ROS negative for fever, chills, nausea, vomiting, chest pain, SOB, dyspnea, dysuria or changes in bowel habits.     PMHx: HTN, A fib, Dx with lupus anticoagulant during A fib admission   PSHx: R inguinal hernia repair with mesh palmira. 20yrs ago   Allergies: None   Home Meds: Pradaxa, ASA, bystolic, losartan, amlodipine and atorvastatin   FamHx: Sister diagnosed with renal CA   SocHx: Current smoker 1pack/dayx>20yrs, occasional EtOH use

## 2019-11-02 NOTE — CONSULT NOTE ADULT - SUBJECTIVE AND OBJECTIVE BOX
[facility]                                                        Office: [address] [address]                                                       Telephone: [phone]. Fax:[phone]                                                              CARDIOLOGY CONSULTATION NOTE                                                                                             Consult requested by:      Reason for Consultation:     History obtained by: Patient and medical record     obtained: No    Chief complaint:    Patient is a 59y old  Male who presents with a chief complaint of Gallstone Pancreatitis Evaluation (02 Nov 2019 11:21)      HPI: 58 y/o male PMH Paroxysmal AFib, Lupus anticoagulant on Pradaxa, HLD, HTN presents to ED for RUQ pain. U/S abd shows cholelithiasis; CT abd pelvis showed acute pancreatitis,  labs lipase >3000. Possible MRCP tomorrow. Cardiology called for evaluation of Afib on Pradaxa/ risk stratification for possible lap jennifer once pancreatitis resolved. Denies fever, chills, cough, phlegm production, shortness of breath, dyspnea on exertion, orthopnea, PND, edema, chest pain, pressure, palpitations, irregular, fast heart beat, vomiting, melena, rectal bleed, hematuria, lightheadedness, dizziness, syncope, near syncope. Denies chest pain/dyspnea with exertion. States follows with cardiologist out of [place]- Dr. Cade; had a [facility] last fall, no stents, "the blockage was under the wall not in the vessel, so did not need a stent."       REVIEW OF SYMPTOMS: Cardiovascular:  See HPI. No chest pain,  No dyspnea,  No syncope,  No palpitations, No dizziness, No Orthopnea, No Paroxsymal nocturnal dyspnea;  Respiratory:  No Dyspnea, No cough, Genitourinary:  No dysuria, no hematuria; Gastrointestinal:  No nausea, no vomiting. No diarrhea.  No dark color stool, no melena ; Neurological: No headache, no dizziness, no slurred speech;  Psychiatric: No agitation, no anxiety.  ALL OTHER REVIEW OF SYSTEMS ARE NEGATIVE.    ALLERGIES: Allergies  No Known Allergies  Intolerances      CURRENT MEDICATIONS:  heparin  Infusion.  lactated ringers.  sodium chloride 0.9% Bolus      HOME MEDICATIONS:    PAST MEDICAL HISTORY  Hypertension  A-fib  Lupus anticoagulant     PAST SURGICAL HISTORY  No significant past surgical history      FAMILY HISTORY:  Denies significant family history     SOCIAL HISTORY:   CIGARETTES: Current smoker- PPD 20 years   ALCOHOL: Denies   DRUGS: Denies     Vital Signs Last 24 Hrs  T(C): 36.5 (02 Nov 2019 08:27), Max: 36.5 (02 Nov 2019 08:27)  T(F): 97.7 (02 Nov 2019 08:27), Max: 97.7 (02 Nov 2019 08:27)  HR: 58 (02 Nov 2019 08:27) (50 - 58)  BP: 146/88 (02 Nov 2019 08:27) (111/79 - 177/103)  RR: 18 (02 Nov 2019 08:27) (18 - 20)  SpO2: 96% (02 Nov 2019 08:27) (94% - 96%)      PHYSICAL EXAM:  Constitutional: Comfortable . No acute distress.   HEENT: Atraumatic and normocephalic , neck is supple . no JVD. No carotid bruit. PEERL   CNS: A&Ox3. No focal deficits. EOMI. Cranial nerves II-IX are intact.   Lymph Nodes: Cervical : Not palpable.  Respiratory: CTAB  Cardiovascular: S1S2 RRR. No murmur/rubs or gallop.  Gastrointestinal: Soft tender RUQ, non distended . +Bowel sounds.   Extremities: No edema.   Psychiatric: Calm . no agitation.  Skin: No skin rash/ulcers visualized to face, hands or feet.      LABS:                        16.3   10.67 )-----------( 231      ( 02 Nov 2019 07:35 )             48.3     11-02    138  |  100  |  11.0  ----------------------------<  125<H>  4.6   |  28.0  |  0.69    Ca    8.8      02 Nov 2019 07:35  Mg     1.8     11-02    TPro  6.2<L>  /  Alb  4.1  /  TBili  3.1<H>  /  DBili  1.0<H>  /  AST  223<H>  /  ALT  374<H>  /  AlkPhos  241<H>  11-02      PT/INR - ( 02 Nov 2019 11:28 )   PT: 12.5 sec;   INR: 1.08 ratio    PTT - ( 02 Nov 2019 09:09 )  PTT:36.5 sec      INTERPRETATION OF TELEMETRY: Reviewed by me.   ECG: SB, NSST- T abnl       RADIOLOGY & ADDITIONAL STUDIES:    X-ray:  reviewed by me.   CT scan:   < from: CT Abdomen and Pelvis w/ IV Cont (11.02.19 @ 04:54) >  FINDINGS:   Lungs: Bibasilar subsegmental atelectasis.     Liver: Normal. No mass.   Gallbladder and bile ducts: Cholelithiasis. No cholecystitis or biliary   ductal   dilatation.   Pancreas: Diffuse peripancreatic inflammation extending into the anterior   pararenal space  , compatible with acute pancreatitis.   Spleen: Normal. No splenomegaly.   Adrenals: Normal. No mass.   Kidneys and ureters: Round hypodense lesion of the right kidney does not   meet   strict CT criteria for a simple cyst and is indeterminate, potentially a   hyperdense cyst. It measures 5.2 cm and 22 Hounsfield units. Kidneys   otherwise   unremarkable.   Stomach and bowel: Colonic diverticulosis. No diverticulitis. No bowel   wall   thickening or intestinal obstruction.   Appendix: Normal appendix.   Intraperitoneal space: Unremarkable. No free air. No significant fluid   collection.   Vasculature: Unremarkable. No abdominal aortic aneurysm.   Lymph nodes: Unremarkable. No enlarged lymph nodes.     Bladder: Unremarkable as visualized.   Reproductive: Prostatomegaly.   Bones/joints: Unremarkable. No acute fracture.   Soft tissues: Left inguinal hernia containing fat only.     IMPRESSION:   Diffuse peripancreatic inflammation extending into theanterior pararenal   space,   compatible with acute pancreatitis.        < end of copied text >    < from: US Gallbladder (11.01.19 @ 22:31) >  FINDINGS:      The gallbladder is filled with shadowing gallstones. Gallbladder wall   thickness is upper limits of normal measuring up to 2.8 mm. There is no   pericholecystic fluid. Sonographic Vega's sign is negative, however,   patient received painmedication prior to exam. The common bile duct is   mildly dilated measuring up to 7 mm.    IMPRESSION:    Cholelithiasis with upper limits wall thickness of the gallbladder. No   pericholecystic fluid. Sonographic Vega's sign is negative, however,  patient received pain medication prior to exam. Mildly dilated common   bile duct. If there is continued concern for acute cholecystitis, HIDA   scan should be considered.    < end of copied text > [facility]                                                        Office: [address] [address]                                                       Telephone: [phone]. Fax:[phone]                                                              CARDIOLOGY CONSULTATION NOTE    History obtained by: Patient and medical record     obtained: No    Chief complaint:    Patient is a 59y old  Male who presents with a chief complaint of Gallstone Pancreatitis Evaluation (02 Nov 2019 11:21)      HPI: 60 y/o male PMH Paroxysmal AFib, Lupus anticoagulant on Pradaxa, DVT, PE, HLD, HTN presents to ED for RUQ pain. U/S abd shows cholelithiasis; CT abd pelvis showed acute pancreatitis,  labs lipase >3000. Possible MRCP tomorrow. Cardiology called for evaluation of Afib on Pradaxa/ risk stratification for possible lap jennifer once pancreatitis resolved. Denies fever, chills, cough, phlegm production, shortness of breath, dyspnea on exertion, orthopnea, PND, edema, chest pain, pressure, palpitations, irregular, fast heart beat, vomiting, melena, rectal bleed, hematuria, lightheadedness, dizziness, syncope, near syncope. Denies chest pain/dyspnea with exertion. States follows with cardiologist out of [place]- Dr. Cade; had a [facility] last fall, no stents, "the blockage was under the wall not in the vessel, so did not need a stent."       REVIEW OF SYMPTOMS: Cardiovascular:  See HPI. No chest pain,  No dyspnea,  No syncope,  No palpitations, No dizziness, No Orthopnea, No Paroxsymal nocturnal dyspnea;  Respiratory:  No Dyspnea, No cough, Genitourinary:  No dysuria, no hematuria; Gastrointestinal:  No nausea, no vomiting. No diarrhea.  No dark color stool, no melena ; Neurological: No headache, no dizziness, no slurred speech;  Psychiatric: No agitation, no anxiety.  ALL OTHER REVIEW OF SYSTEMS ARE NEGATIVE.    ALLERGIES: Allergies  No Known Allergies  Intolerances      CURRENT MEDICATIONS:  heparin  Infusion.  lactated ringers.  sodium chloride 0.9% Bolus      HOME MEDICATIONS:    PAST MEDICAL HISTORY  Hypertension  A-fib  Lupus anticoagulant     PAST SURGICAL HISTORY  No significant past surgical history      FAMILY HISTORY:  Denies significant family history     SOCIAL HISTORY:   CIGARETTES: Current smoker- PPD 20 years   ALCOHOL: Denies   DRUGS: Denies     Vital Signs Last 24 Hrs  T(C): 36.5 (02 Nov 2019 08:27), Max: 36.5 (02 Nov 2019 08:27)  T(F): 97.7 (02 Nov 2019 08:27), Max: 97.7 (02 Nov 2019 08:27)  HR: 58 (02 Nov 2019 08:27) (50 - 58)  BP: 146/88 (02 Nov 2019 08:27) (111/79 - 177/103)  RR: 18 (02 Nov 2019 08:27) (18 - 20)  SpO2: 96% (02 Nov 2019 08:27) (94% - 96%)      PHYSICAL EXAM:  Constitutional: Comfortable . No acute distress.   HEENT: Atraumatic and normocephalic , neck is supple . no JVD. No carotid bruit. PEERL   CNS: A&Ox3. No focal deficits. EOMI. Cranial nerves II-IX are intact.   Lymph Nodes: Cervical : Not palpable.  Respiratory: CTAB  Cardiovascular: S1S2 RRR. No murmur/rubs or gallop.  Gastrointestinal: Soft tender RUQ, non distended . +Bowel sounds.   Extremities: No edema.   Psychiatric: Calm . no agitation.  Skin: No skin rash/ulcers visualized to face, hands or feet.      LABS:                        16.3   10.67 )-----------( 231      ( 02 Nov 2019 07:35 )             48.3     11-02    138  |  100  |  11.0  ----------------------------<  125<H>  4.6   |  28.0  |  0.69    Ca    8.8      02 Nov 2019 07:35  Mg     1.8     11-02    TPro  6.2<L>  /  Alb  4.1  /  TBili  3.1<H>  /  DBili  1.0<H>  /  AST  223<H>  /  ALT  374<H>  /  AlkPhos  241<H>  11-02      PT/INR - ( 02 Nov 2019 11:28 )   PT: 12.5 sec;   INR: 1.08 ratio    PTT - ( 02 Nov 2019 09:09 )  PTT:36.5 sec      INTERPRETATION OF TELEMETRY: Reviewed by me.   ECG: SB, NSST- T abnl       RADIOLOGY & ADDITIONAL STUDIES:    X-ray:  reviewed by me.   CT scan:   < from: CT Abdomen and Pelvis w/ IV Cont (11.02.19 @ 04:54) >  FINDINGS:   Lungs: Bibasilar subsegmental atelectasis.     Liver: Normal. No mass.   Gallbladder and bile ducts: Cholelithiasis. No cholecystitis or biliary   ductal   dilatation.   Pancreas: Diffuse peripancreatic inflammation extending into the anterior   pararenal space  , compatible with acute pancreatitis.   Spleen: Normal. No splenomegaly.   Adrenals: Normal. No mass.   Kidneys and ureters: Round hypodense lesion of the right kidney does not   meet   strict CT criteria for a simple cyst and is indeterminate, potentially a   hyperdense cyst. It measures 5.2 cm and 22 Hounsfield units. Kidneys   otherwise   unremarkable.   Stomach and bowel: Colonic diverticulosis. No diverticulitis. No bowel   wall   thickening or intestinal obstruction.   Appendix: Normal appendix.   Intraperitoneal space: Unremarkable. No free air. No significant fluid   collection.   Vasculature: Unremarkable. No abdominal aortic aneurysm.   Lymph nodes: Unremarkable. No enlarged lymph nodes.     Bladder: Unremarkable as visualized.   Reproductive: Prostatomegaly.   Bones/joints: Unremarkable. No acute fracture.   Soft tissues: Left inguinal hernia containing fat only.     IMPRESSION:   Diffuse peripancreatic inflammation extending into theanterior pararenal   space,   compatible with acute pancreatitis.        < end of copied text >    < from: US Gallbladder (11.01.19 @ 22:31) >  FINDINGS:      The gallbladder is filled with shadowing gallstones. Gallbladder wall   thickness is upper limits of normal measuring up to 2.8 mm. There is no   pericholecystic fluid. Sonographic Vega's sign is negative, however,   patient received painmedication prior to exam. The common bile duct is   mildly dilated measuring up to 7 mm.    IMPRESSION:    Cholelithiasis with upper limits wall thickness of the gallbladder. No   pericholecystic fluid. Sonographic Vega's sign is negative, however,  patient received pain medication prior to exam. Mildly dilated common   bile duct. If there is continued concern for acute cholecystitis, HIDA   scan should be considered.    < end of copied text >

## 2019-11-03 LAB
ALBUMIN SERPL ELPH-MCNC: 3.4 G/DL — SIGNIFICANT CHANGE UP (ref 3.3–5.2)
ALP SERPL-CCNC: 184 U/L — HIGH (ref 40–120)
ALT FLD-CCNC: 197 U/L — HIGH
ANION GAP SERPL CALC-SCNC: 11 MMOL/L — SIGNIFICANT CHANGE UP (ref 5–17)
APTT BLD: 107.6 SEC — HIGH (ref 27.5–36.3)
APTT BLD: 64.3 SEC — HIGH (ref 27.5–36.3)
APTT BLD: 71.4 SEC — HIGH (ref 27.5–36.3)
AST SERPL-CCNC: 62 U/L — HIGH
BASOPHILS # BLD AUTO: 0.04 K/UL — SIGNIFICANT CHANGE UP (ref 0–0.2)
BASOPHILS NFR BLD AUTO: 0.3 % — SIGNIFICANT CHANGE UP (ref 0–2)
BILIRUB DIRECT SERPL-MCNC: 0.4 MG/DL — HIGH (ref 0–0.3)
BILIRUB INDIRECT FLD-MCNC: 2.1 MG/DL — HIGH (ref 0.2–1)
BILIRUB SERPL-MCNC: 2.5 MG/DL — HIGH (ref 0.4–2)
BUN SERPL-MCNC: 7 MG/DL — LOW (ref 8–20)
CALCIUM SERPL-MCNC: 8.4 MG/DL — LOW (ref 8.6–10.2)
CHLORIDE SERPL-SCNC: 101 MMOL/L — SIGNIFICANT CHANGE UP (ref 98–107)
CO2 SERPL-SCNC: 27 MMOL/L — SIGNIFICANT CHANGE UP (ref 22–29)
CREAT SERPL-MCNC: 0.6 MG/DL — SIGNIFICANT CHANGE UP (ref 0.5–1.3)
EOSINOPHIL # BLD AUTO: 0.07 K/UL — SIGNIFICANT CHANGE UP (ref 0–0.5)
EOSINOPHIL NFR BLD AUTO: 0.5 % — SIGNIFICANT CHANGE UP (ref 0–6)
GLUCOSE SERPL-MCNC: 104 MG/DL — SIGNIFICANT CHANGE UP (ref 70–115)
HCT VFR BLD CALC: 41.6 % — SIGNIFICANT CHANGE UP (ref 39–50)
HCV AB S/CO SERPL IA: 0.18 S/CO — SIGNIFICANT CHANGE UP (ref 0–0.99)
HCV AB SERPL-IMP: SIGNIFICANT CHANGE UP
HGB BLD-MCNC: 13.9 G/DL — SIGNIFICANT CHANGE UP (ref 13–17)
IMM GRANULOCYTES NFR BLD AUTO: 0.6 % — SIGNIFICANT CHANGE UP (ref 0–1.5)
LYMPHOCYTES # BLD AUTO: 15.9 % — SIGNIFICANT CHANGE UP (ref 13–44)
LYMPHOCYTES # BLD AUTO: 2.03 K/UL — SIGNIFICANT CHANGE UP (ref 1–3.3)
MAGNESIUM SERPL-MCNC: 1.6 MG/DL — SIGNIFICANT CHANGE UP (ref 1.6–2.6)
MCHC RBC-ENTMCNC: 29.4 PG — SIGNIFICANT CHANGE UP (ref 27–34)
MCHC RBC-ENTMCNC: 33.4 GM/DL — SIGNIFICANT CHANGE UP (ref 32–36)
MCV RBC AUTO: 88.1 FL — SIGNIFICANT CHANGE UP (ref 80–100)
MONOCYTES # BLD AUTO: 1.18 K/UL — HIGH (ref 0–0.9)
MONOCYTES NFR BLD AUTO: 9.2 % — SIGNIFICANT CHANGE UP (ref 2–14)
NEUTROPHILS # BLD AUTO: 9.37 K/UL — HIGH (ref 1.8–7.4)
NEUTROPHILS NFR BLD AUTO: 73.5 % — SIGNIFICANT CHANGE UP (ref 43–77)
PHOSPHATE SERPL-MCNC: 2.6 MG/DL — SIGNIFICANT CHANGE UP (ref 2.4–4.7)
PLATELET # BLD AUTO: 193 K/UL — SIGNIFICANT CHANGE UP (ref 150–400)
POTASSIUM SERPL-MCNC: 3 MMOL/L — LOW (ref 3.5–5.3)
POTASSIUM SERPL-SCNC: 3 MMOL/L — LOW (ref 3.5–5.3)
PROT SERPL-MCNC: 5.5 G/DL — LOW (ref 6.6–8.7)
RBC # BLD: 4.72 M/UL — SIGNIFICANT CHANGE UP (ref 4.2–5.8)
RBC # FLD: 13.2 % — SIGNIFICANT CHANGE UP (ref 10.3–14.5)
SODIUM SERPL-SCNC: 139 MMOL/L — SIGNIFICANT CHANGE UP (ref 135–145)
WBC # BLD: 12.77 K/UL — HIGH (ref 3.8–10.5)
WBC # FLD AUTO: 12.77 K/UL — HIGH (ref 3.8–10.5)

## 2019-11-03 PROCEDURE — 99232 SBSQ HOSP IP/OBS MODERATE 35: CPT

## 2019-11-03 PROCEDURE — 99233 SBSQ HOSP IP/OBS HIGH 50: CPT

## 2019-11-03 RX ORDER — INDOMETHACIN 50 MG
100 CAPSULE ORAL ONCE
Refills: 0 | Status: COMPLETED | OUTPATIENT
Start: 2019-11-04 | End: 2019-11-05

## 2019-11-03 RX ORDER — POTASSIUM CHLORIDE 20 MEQ
10 PACKET (EA) ORAL
Refills: 0 | Status: COMPLETED | OUTPATIENT
Start: 2019-11-03 | End: 2019-11-03

## 2019-11-03 RX ORDER — IBUPROFEN 200 MG
600 TABLET ORAL ONCE
Refills: 0 | Status: COMPLETED | OUTPATIENT
Start: 2019-11-03 | End: 2019-11-03

## 2019-11-03 RX ORDER — MORPHINE SULFATE 50 MG/1
0.5 CAPSULE, EXTENDED RELEASE ORAL EVERY 4 HOURS
Refills: 0 | Status: DISCONTINUED | OUTPATIENT
Start: 2019-11-03 | End: 2019-11-07

## 2019-11-03 RX ADMIN — MORPHINE SULFATE 1 MILLIGRAM(S): 50 CAPSULE, EXTENDED RELEASE ORAL at 00:02

## 2019-11-03 RX ADMIN — HEPARIN SODIUM 2000 UNIT(S)/HR: 5000 INJECTION INTRAVENOUS; SUBCUTANEOUS at 02:12

## 2019-11-03 RX ADMIN — MORPHINE SULFATE 1 MILLIGRAM(S): 50 CAPSULE, EXTENDED RELEASE ORAL at 23:20

## 2019-11-03 RX ADMIN — SODIUM CHLORIDE 125 MILLILITER(S): 9 INJECTION, SOLUTION INTRAVENOUS at 05:51

## 2019-11-03 RX ADMIN — Medication 100 MILLIEQUIVALENT(S): at 20:24

## 2019-11-03 RX ADMIN — HEPARIN SODIUM 1800 UNIT(S)/HR: 5000 INJECTION INTRAVENOUS; SUBCUTANEOUS at 11:40

## 2019-11-03 RX ADMIN — Medication 600 MILLIGRAM(S): at 01:05

## 2019-11-03 RX ADMIN — MORPHINE SULFATE 1 MILLIGRAM(S): 50 CAPSULE, EXTENDED RELEASE ORAL at 23:35

## 2019-11-03 RX ADMIN — Medication 100 MILLIEQUIVALENT(S): at 17:34

## 2019-11-03 RX ADMIN — SODIUM CHLORIDE 125 MILLILITER(S): 9 INJECTION, SOLUTION INTRAVENOUS at 17:34

## 2019-11-03 RX ADMIN — Medication 100 MILLIEQUIVALENT(S): at 18:34

## 2019-11-03 RX ADMIN — ONDANSETRON 4 MILLIGRAM(S): 8 TABLET, FILM COATED ORAL at 23:20

## 2019-11-03 RX ADMIN — Medication 50 GRAM(S): at 07:32

## 2019-11-03 RX ADMIN — SODIUM CHLORIDE 125 MILLILITER(S): 9 INJECTION, SOLUTION INTRAVENOUS at 00:08

## 2019-11-03 NOTE — PROGRESS NOTE ADULT - SUBJECTIVE AND OBJECTIVE BOX
INTERVAL HPI/OVERNIGHT EVENTS:    No acute events overnight. Patient evaluated at bedside and found hemodynamically stable and in no acute distress. Patient reports feeling uncomfortable and having some pain to RUQ, denies nausea vomiting. Patient currently NPO and on a heparin drip. MRCP showed 2mm stone/sludge on CBD.     SUBJECTIVE:      MEDICATIONS  (STANDING):  heparin  Infusion.  Unit(s)/Hr (18 mL/Hr) IV Continuous <Continuous>  lactated ringers. 1000 milliLiter(s) (125 mL/Hr) IV Continuous <Continuous>  magnesium sulfate  IVPB 2 Gram(s) IV Intermittent once  sodium chloride 0.9% Bolus 1000 milliLiter(s) IV Bolus once    MEDICATIONS  (PRN):  morphine  - Injectable 2 milliGRAM(s) IV Push every 4 hours PRN Severe Pain (7 - 10)  morphine  - Injectable 1 milliGRAM(s) IV Push every 4 hours PRN Moderate Pain (4 - 6)  ondansetron Injectable 4 milliGRAM(s) IV Push every 4 hours PRN Nausea and/or Vomiting      Vital Signs Last 24 Hrs  T(C): 37.7 (02 Nov 2019 22:25), Max: 37.7 (02 Nov 2019 22:25)  T(F): 99.8 (02 Nov 2019 22:25), Max: 99.8 (02 Nov 2019 22:25)  HR: 93 (02 Nov 2019 22:25) (50 - 93)  BP: 123/80 (02 Nov 2019 22:25) (111/79 - 146/88)  BP(mean): --  RR: 18 (02 Nov 2019 22:25) (18 - 18)  SpO2: 94% (02 Nov 2019 22:25) (94% - 96%)    PHYSICAL EXAM:    General: no acute distress  Chest: nonlabored breathing   Abdomen: non-distended soft and depressible, tenderness to palpation on RUQ, (+) pineda, no rebound or guarding          I&O's Detail    02 Nov 2019 08:01  -  03 Nov 2019 01:04  --------------------------------------------------------  IN:    Sodium Chloride 0.9% IV Bolus: 250 mL  Total IN: 250 mL    OUT:  Total OUT: 0 mL    Total NET: 250 mL          LABS:                        16.0   16.01 )-----------( 259      ( 02 Nov 2019 19:45 )             47.0     11-02    138  |  100  |  11.0  ----------------------------<  125<H>  4.6   |  28.0  |  0.69    Ca    8.8      02 Nov 2019 07:35  Mg     1.8     11-02    TPro  6.2<L>  /  Alb  4.1  /  TBili  3.1<H>  /  DBili  1.0<H>  /  AST  223<H>  /  ALT  374<H>  /  AlkPhos  241<H>  11-02    PT/INR - ( 02 Nov 2019 11:28 )   PT: 12.5 sec;   INR: 1.08 ratio         PTT - ( 02 Nov 2019 16:41 )  PTT:45.9 sec      RADIOLOGY & ADDITIONAL STUDIES:  MRCP 11/2/19:   IMPRESSION:     No intrahepatic or extrahepatic biliary ductal dilatation.     2 mm stone or sludge ball in the common bile duct.     Gallstones.     Findings compatible with pancreatitis.

## 2019-11-03 NOTE — PROGRESS NOTE ADULT - SUBJECTIVE AND OBJECTIVE BOX
Chief Complaint: This is a 59y old Male patient being seen for follow-up consultation for gallstone pancreatitis	.    HPI / 24H events:  Patient reports feeling somewhat better overall.  Still experiencing fairly significant epigastric pain, especially with movement.  MRCP positive for choledocholithiasis.    ROS: A 14-point review of systems was reviewed and was otherwise negative save what was reported in the HPI.    PAST MEDICAL/SURGICAL HISTORY:  Hypertension  A-fib  No significant past surgical history    MEDICATIONS  (STANDING):  heparin  Infusion.  Unit(s)/Hr (18 mL/Hr) IV Continuous <Continuous>  lactated ringers. 1000 milliLiter(s) (125 mL/Hr) IV Continuous <Continuous>  potassium chloride  10 mEq/100 mL IVPB 10 milliEquivalent(s) IV Intermittent every 1 hour  sodium chloride 0.9% Bolus 1000 milliLiter(s) IV Bolus once    MEDICATIONS  (PRN):  morphine  - Injectable 0.5 milliGRAM(s) IV Push every 4 hours PRN Mild Pain (1 - 3)  morphine  - Injectable 2 milliGRAM(s) IV Push every 4 hours PRN Severe Pain (7 - 10)  morphine  - Injectable 1 milliGRAM(s) IV Push every 4 hours PRN Moderate Pain (4 - 6)  ondansetron Injectable 4 milliGRAM(s) IV Push every 4 hours PRN Nausea and/or Vomiting    T(C): 37.1 (11-03-19 @ 16:29), Max: 38.2 (11-03-19 @ 00:00)  HR: 65 (11-03-19 @ 16:29) (65 - 93)  BP: 153/89 (11-03-19 @ 16:29) (116/71 - 153/89)  RR: 18 (11-03-19 @ 16:29) (18 - 20)  SpO2: 98% (11-03-19 @ 16:29) (92% - 98%)    I&O's Summary    02 Nov 2019 08:01  -  03 Nov 2019 07:00  --------------------------------------------------------  IN: 250 mL / OUT: 0 mL / NET: 250 mL    03 Nov 2019 07:01  -  03 Nov 2019 19:25  --------------------------------------------------------  IN: 1581 mL / OUT: 2 mL / NET: 1579 mL                              13.9   12.77 )-----------( 193      ( 03 Nov 2019 06:13 )             41.6     11-03    139  |  101  |  7.0<L>  ----------------------------<  104  3.0<L>   |  27.0  |  0.60    Ca    8.4<L>      03 Nov 2019 06:13  Phos  2.6     11-03  Mg     1.6     11-03    TPro  5.5<L>  /  Alb  3.4  /  TBili  2.5<H>  /  DBili  0.4<H>  /  AST  62<H>  /  ALT  197<H>  /  AlkPhos  184<H>  11-03    LIVER FUNCTIONS - ( 03 Nov 2019 06:13 )  Alb: 3.4 g/dL / Pro: 5.5 g/dL / ALK PHOS: 184 U/L / ALT: 197 U/L / AST: 62 U/L / GGT: x             Lipase, Serum: 2416 U/L (11-02-19 @ 07:35)  Lipase, Serum: >3000 U/L (11-01-19 @ 22:19)    PT/INR - ( 02 Nov 2019 11:28 )   PT: 12.5 sec;   INR: 1.08 ratio         PTT - ( 03 Nov 2019 17:27 )  PTT:71.4 sec    IMAGING: I personally reviewed the MRCP, and I agree with the radiologist's interpretation as described below:    < from: MR MRCP No Cont (11.02.19 @ 17:29) >  Findings:    The liver demonstrates normal signal. There are scattered subcentimeter   hyperintensities in the liver.    Intrahepatic and extrahepatic bile ducts are normal in caliber. Common   bile duct measures 4 mm. There is a 2 mm stone or sludge ball in the   distal common bile duct.    The gallbladder contains gallstones.    The pancreas has heterogeneous signal intensity with peripancreatic edema   compatible with pancreatitis. Trace left upper quadrant ascites.    The spleen is normal.     The adrenal glands are normal.      The kidneys demonstrate probable bilateral renal cysts measuring up to   5.4 cm on the right.    Lymph nodes are normal in size.        IMPRESSION:     No intrahepatic or extrahepatic biliary ductal dilatation.     2 mm stone or sludge ball in the common bile duct.    Gallstones.    Findings compatible with pancreatitis.     < end of copied text > Breath Sounds equal & clear to percussion & auscultation, no accessory muscle use

## 2019-11-03 NOTE — PROGRESS NOTE ADULT - ASSESSMENT
59 year old with working diagnosis gallstone pancreatitis currently afebrile and hemodynamically stable MRCP showing choledocholithiasis  -Heparin Nomogram  -LFT's AM  -Continue NPO  -LOLA  -GI: ERCP 11/4  -Cardiology: continue heparin drip, restart pradaxa when stable

## 2019-11-04 ENCOUNTER — TRANSCRIPTION ENCOUNTER (OUTPATIENT)
Age: 59
End: 2019-11-04

## 2019-11-04 LAB
ALBUMIN SERPL ELPH-MCNC: 3.3 G/DL — SIGNIFICANT CHANGE UP (ref 3.3–5.2)
ALP SERPL-CCNC: 152 U/L — HIGH (ref 40–120)
ALT FLD-CCNC: 118 U/L — HIGH
ANION GAP SERPL CALC-SCNC: 14 MMOL/L — SIGNIFICANT CHANGE UP (ref 5–17)
APTT BLD: 27.9 SEC — SIGNIFICANT CHANGE UP (ref 27.5–36.3)
APTT BLD: 28.5 SEC — SIGNIFICANT CHANGE UP (ref 27.5–36.3)
APTT BLD: 39.5 SEC — HIGH (ref 27.5–36.3)
AST SERPL-CCNC: 24 U/L — SIGNIFICANT CHANGE UP
BILIRUB DIRECT SERPL-MCNC: 0.4 MG/DL — HIGH (ref 0–0.3)
BILIRUB INDIRECT FLD-MCNC: 2 MG/DL — HIGH (ref 0.2–1)
BILIRUB SERPL-MCNC: 2.4 MG/DL — HIGH (ref 0.4–2)
BLD GP AB SCN SERPL QL: SIGNIFICANT CHANGE UP
BUN SERPL-MCNC: 6 MG/DL — LOW (ref 8–20)
CALCIUM SERPL-MCNC: 8.5 MG/DL — LOW (ref 8.6–10.2)
CHLORIDE SERPL-SCNC: 100 MMOL/L — SIGNIFICANT CHANGE UP (ref 98–107)
CO2 SERPL-SCNC: 25 MMOL/L — SIGNIFICANT CHANGE UP (ref 22–29)
CREAT SERPL-MCNC: 0.53 MG/DL — SIGNIFICANT CHANGE UP (ref 0.5–1.3)
GLUCOSE SERPL-MCNC: 96 MG/DL — SIGNIFICANT CHANGE UP (ref 70–115)
HCT VFR BLD CALC: 39.4 % — SIGNIFICANT CHANGE UP (ref 39–50)
HCT VFR BLD CALC: 40.2 % — SIGNIFICANT CHANGE UP (ref 39–50)
HGB BLD-MCNC: 13.5 G/DL — SIGNIFICANT CHANGE UP (ref 13–17)
HGB BLD-MCNC: 13.5 G/DL — SIGNIFICANT CHANGE UP (ref 13–17)
INR BLD: 1.19 RATIO — HIGH (ref 0.88–1.16)
INR BLD: 1.24 RATIO — HIGH (ref 0.88–1.16)
MAGNESIUM SERPL-MCNC: 1.7 MG/DL — SIGNIFICANT CHANGE UP (ref 1.6–2.6)
MCHC RBC-ENTMCNC: 30 PG — SIGNIFICANT CHANGE UP (ref 27–34)
MCHC RBC-ENTMCNC: 30.2 PG — SIGNIFICANT CHANGE UP (ref 27–34)
MCHC RBC-ENTMCNC: 33.6 GM/DL — SIGNIFICANT CHANGE UP (ref 32–36)
MCHC RBC-ENTMCNC: 34.3 GM/DL — SIGNIFICANT CHANGE UP (ref 32–36)
MCV RBC AUTO: 88.1 FL — SIGNIFICANT CHANGE UP (ref 80–100)
MCV RBC AUTO: 89.3 FL — SIGNIFICANT CHANGE UP (ref 80–100)
PHOSPHATE SERPL-MCNC: 2.3 MG/DL — LOW (ref 2.4–4.7)
PLATELET # BLD AUTO: 180 K/UL — SIGNIFICANT CHANGE UP (ref 150–400)
PLATELET # BLD AUTO: 180 K/UL — SIGNIFICANT CHANGE UP (ref 150–400)
POTASSIUM SERPL-MCNC: 3.3 MMOL/L — LOW (ref 3.5–5.3)
POTASSIUM SERPL-SCNC: 3.3 MMOL/L — LOW (ref 3.5–5.3)
PROT SERPL-MCNC: 5.7 G/DL — LOW (ref 6.6–8.7)
PROTHROM AB SERPL-ACNC: 13.7 SEC — HIGH (ref 10–12.9)
PROTHROM AB SERPL-ACNC: 14.3 SEC — HIGH (ref 10–12.9)
RBC # BLD: 4.47 M/UL — SIGNIFICANT CHANGE UP (ref 4.2–5.8)
RBC # BLD: 4.5 M/UL — SIGNIFICANT CHANGE UP (ref 4.2–5.8)
RBC # FLD: 13.1 % — SIGNIFICANT CHANGE UP (ref 10.3–14.5)
RBC # FLD: 13.1 % — SIGNIFICANT CHANGE UP (ref 10.3–14.5)
SODIUM SERPL-SCNC: 139 MMOL/L — SIGNIFICANT CHANGE UP (ref 135–145)
WBC # BLD: 11.11 K/UL — HIGH (ref 3.8–10.5)
WBC # BLD: 11.19 K/UL — HIGH (ref 3.8–10.5)
WBC # FLD AUTO: 11.11 K/UL — HIGH (ref 3.8–10.5)
WBC # FLD AUTO: 11.19 K/UL — HIGH (ref 3.8–10.5)

## 2019-11-04 PROCEDURE — 99233 SBSQ HOSP IP/OBS HIGH 50: CPT

## 2019-11-04 PROCEDURE — 99231 SBSQ HOSP IP/OBS SF/LOW 25: CPT

## 2019-11-04 RX ORDER — HEPARIN SODIUM 5000 [USP'U]/ML
INJECTION INTRAVENOUS; SUBCUTANEOUS
Qty: 25000 | Refills: 0 | Status: DISCONTINUED | OUTPATIENT
Start: 2019-11-04 | End: 2019-11-05

## 2019-11-04 RX ORDER — SODIUM,POTASSIUM PHOSPHATES 278-250MG
21 POWDER IN PACKET (EA) ORAL ONCE
Refills: 0 | Status: DISCONTINUED | OUTPATIENT
Start: 2019-11-04 | End: 2019-11-04

## 2019-11-04 RX ORDER — SODIUM,POTASSIUM PHOSPHATES 278-250MG
2 POWDER IN PACKET (EA) ORAL ONCE
Refills: 0 | Status: COMPLETED | OUTPATIENT
Start: 2019-11-04 | End: 2019-11-04

## 2019-11-04 RX ORDER — POTASSIUM CHLORIDE 20 MEQ
40 PACKET (EA) ORAL EVERY 4 HOURS
Refills: 0 | Status: COMPLETED | OUTPATIENT
Start: 2019-11-04 | End: 2019-11-04

## 2019-11-04 RX ORDER — MAGNESIUM SULFATE 500 MG/ML
2 VIAL (ML) INJECTION ONCE
Refills: 0 | Status: COMPLETED | OUTPATIENT
Start: 2019-11-04 | End: 2019-11-04

## 2019-11-04 RX ADMIN — SODIUM CHLORIDE 125 MILLILITER(S): 9 INJECTION, SOLUTION INTRAVENOUS at 10:44

## 2019-11-04 RX ADMIN — Medication 2 PACKET(S): at 11:01

## 2019-11-04 RX ADMIN — HEPARIN SODIUM 1800 UNIT(S)/HR: 5000 INJECTION INTRAVENOUS; SUBCUTANEOUS at 10:57

## 2019-11-04 RX ADMIN — Medication 40 MILLIEQUIVALENT(S): at 14:16

## 2019-11-04 RX ADMIN — Medication 40 MILLIEQUIVALENT(S): at 10:42

## 2019-11-04 RX ADMIN — Medication 50 GRAM(S): at 10:43

## 2019-11-04 RX ADMIN — Medication 40 MILLIEQUIVALENT(S): at 17:22

## 2019-11-04 NOTE — PROGRESS NOTE ADULT - SUBJECTIVE AND OBJECTIVE BOX
Pt seen and examined. He continues to c/o epigastric pain and is presently NPO. MRCP shows a questionable small 2 mm. stone vs. sludge in distal CBD.     REVIEW OF SYSTEMS:  Constitutional: No fever, weight loss or fatigue  Cardiovascular: No chest pain, palpitations, dizziness or leg swelling  Gastrointestinal: As noted above.  Skin: No itching, burning, rashes or lesions       MEDICATIONS:  MEDICATIONS  (STANDING):  indomethacin Suppository 100 milliGRAM(s) Rectal once  lactated ringers. 1000 milliLiter(s) (125 mL/Hr) IV Continuous <Continuous>    MEDICATIONS  (PRN):  morphine  - Injectable 0.5 milliGRAM(s) IV Push every 4 hours PRN Mild Pain (1 - 3)  morphine  - Injectable 2 milliGRAM(s) IV Push every 4 hours PRN Severe Pain (7 - 10)  morphine  - Injectable 1 milliGRAM(s) IV Push every 4 hours PRN Moderate Pain (4 - 6)  ondansetron Injectable 4 milliGRAM(s) IV Push every 4 hours PRN Nausea and/or Vomiting      Allergies    No Known Allergies    Intolerances        Vital Signs Last 24 Hrs  T(C): 37.1 (03 Nov 2019 16:29), Max: 37.1 (03 Nov 2019 16:29)  T(F): 98.7 (03 Nov 2019 16:29), Max: 98.7 (03 Nov 2019 16:29)  HR: 65 (03 Nov 2019 16:29) (65 - 66)  BP: 153/89 (03 Nov 2019 16:29) (116/71 - 153/89)  BP(mean): --  RR: 18 (03 Nov 2019 16:29) (18 - 20)  SpO2: 98% (03 Nov 2019 16:29) (98% - 98%)    11-03 @ 07:01  -  11-04 @ 07:00  --------------------------------------------------------  IN: 3207 mL / OUT: 2302 mL / NET: 905 mL        PHYSICAL EXAM:    General: Well developed; well nourished; in no acute distress  HEENT: MMM, conjunctiva pink and sclera anicteric.  Lungs: clear to auscultation bilaterally  Cor: RRR S1, S2 only.  Gastrointestinal: Abdomen: Soft, significant epigastric and LUQ tenderness to moderate palpation, non-distended; Normal bowel sounds; No hepatosplenomegaly  Extremities: no cyanosis, clubbing or edema.  Skin: Warm and dry. No obvious rash  Neuro: Pt. a + o x 3    LABS:  CBC Full  -  ( 04 Nov 2019 07:49 )  WBC Count : 11.11 K/uL  RBC Count : 4.47 M/uL  Hemoglobin : 13.5 g/dL  Hematocrit : 39.4 %  Platelet Count - Automated : 180 K/uL  Mean Cell Volume : 88.1 fl  Mean Cell Hemoglobin : 30.2 pg  Mean Cell Hemoglobin Concentration : 34.3 gm/dL  Auto Neutrophil # : x  Auto Lymphocyte # : x  Auto Monocyte # : x  Auto Eosinophil # : x  Auto Basophil # : x  Auto Neutrophil % : x  Auto Lymphocyte % : x  Auto Monocyte % : x  Auto Eosinophil % : x  Auto Basophil % : x    11-03    139  |  101  |  7.0<L>  ----------------------------<  104  3.0<L>   |  27.0  |  0.60    Ca    8.4<L>      03 Nov 2019 06:13  Phos  2.6     11-03  Mg     1.6     11-03    TPro  5.5<L>  /  Alb  3.4  /  TBili  2.5<H>  /  DBili  0.4<H>  /  AST  62<H>  /  ALT  197<H>  /  AlkPhos  184<H>  11-03    PT/INR - ( 04 Nov 2019 07:49 )   PT: 14.3 sec;   INR: 1.24 ratio         PTT - ( 04 Nov 2019 07:49 )  PTT:28.5 sec                  RADIOLOGY & ADDITIONAL STUDIES (The following images were personally reviewed):

## 2019-11-04 NOTE — PROGRESS NOTE ADULT - SUBJECTIVE AND OBJECTIVE BOX
INTERVAL HPI/OVERNIGHT EVENTS:      No acute events overnight. Patient evaluated at bedside and found hemodynamically stable and in no acute distress. Patient reports feeling uncomfortable and having some pain to RUQ worse with movement, denies nausea vomiting. Patient currently NPO and on a heparin drip.    MEDICATIONS  (STANDING):  indomethacin Suppository 100 milliGRAM(s) Rectal once  lactated ringers. 1000 milliLiter(s) (125 mL/Hr) IV Continuous <Continuous>  sodium chloride 0.9% Bolus 1000 milliLiter(s) IV Bolus once    MEDICATIONS  (PRN):  morphine  - Injectable 0.5 milliGRAM(s) IV Push every 4 hours PRN Mild Pain (1 - 3)  morphine  - Injectable 2 milliGRAM(s) IV Push every 4 hours PRN Severe Pain (7 - 10)  morphine  - Injectable 1 milliGRAM(s) IV Push every 4 hours PRN Moderate Pain (4 - 6)  ondansetron Injectable 4 milliGRAM(s) IV Push every 4 hours PRN Nausea and/or Vomiting      Vital Signs Last 24 Hrs  T(C): 37.1 (03 Nov 2019 16:29), Max: 37.1 (03 Nov 2019 16:29)  T(F): 98.7 (03 Nov 2019 16:29), Max: 98.7 (03 Nov 2019 16:29)  HR: 65 (03 Nov 2019 16:29) (65 - 66)  BP: 153/89 (03 Nov 2019 16:29) (116/71 - 153/89)  BP(mean): --  RR: 18 (03 Nov 2019 16:29) (18 - 20)  SpO2: 98% (03 Nov 2019 16:29) (98% - 98%)    Physical Exam:    General: no acute distress  Chest: nonlabored breathing   Abdomen: non-distended soft and depressible, tenderness to palpation on RUQ, (+) pineda, no rebound or guarding    I&O's Detail    02 Nov 2019 08:01  -  03 Nov 2019 07:00  --------------------------------------------------------  IN:    Sodium Chloride 0.9% IV Bolus: 250 mL  Total IN: 250 mL    OUT:  Total OUT: 0 mL    Total NET: 250 mL      03 Nov 2019 07:01  -  04 Nov 2019 03:03  --------------------------------------------------------  IN:    heparin  Infusion.: 206 mL    lactated ringers.: 1375 mL  Total IN: 1581 mL    OUT:    Voided: 2 mL  Total OUT: 2 mL    Total NET: 1579 mL          LABS:                        13.9   12.77 )-----------( 193      ( 03 Nov 2019 06:13 )             41.6     11-03    139  |  101  |  7.0<L>  ----------------------------<  104  3.0<L>   |  27.0  |  0.60    Ca    8.4<L>      03 Nov 2019 06:13  Phos  2.6     11-03  Mg     1.6     11-03    TPro  5.5<L>  /  Alb  3.4  /  TBili  2.5<H>  /  DBili  0.4<H>  /  AST  62<H>  /  ALT  197<H>  /  AlkPhos  184<H>  11-03    PT/INR - ( 02 Nov 2019 11:28 )   PT: 12.5 sec;   INR: 1.08 ratio         PTT - ( 03 Nov 2019 17:27 )  PTT:71.4 sec      RADIOLOGY & ADDITIONAL STUDIES:

## 2019-11-04 NOTE — PROGRESS NOTE ADULT - ASSESSMENT
59 year old with working diagnosis gallstone pancreatitis currently afebrile and hemodynamically stable MRCP showing choledocholithiasis  -Heparin Nomogram, will hold for ERCP with GI today  - f/u Gi after ERCP  -LFT's AM  -Continue NPO  -LOLA  -Cardiology: continue heparin drip, restart pradaxa when stable, cardiac clear for surgery

## 2019-11-05 ENCOUNTER — TRANSCRIPTION ENCOUNTER (OUTPATIENT)
Age: 59
End: 2019-11-05

## 2019-11-05 LAB
ABO RH CONFIRMATION: SIGNIFICANT CHANGE UP
ALBUMIN SERPL ELPH-MCNC: 3.1 G/DL — LOW (ref 3.3–5.2)
ALP SERPL-CCNC: 168 U/L — HIGH (ref 40–120)
ALT FLD-CCNC: 87 U/L — HIGH
AMYLASE P1 CFR SERPL: 55 U/L — SIGNIFICANT CHANGE UP (ref 36–128)
ANION GAP SERPL CALC-SCNC: 12 MMOL/L — SIGNIFICANT CHANGE UP (ref 5–17)
APTT BLD: 27.2 SEC — LOW (ref 27.5–36.3)
APTT BLD: 44.9 SEC — HIGH (ref 27.5–36.3)
AST SERPL-CCNC: 22 U/L — SIGNIFICANT CHANGE UP
BASOPHILS # BLD AUTO: 0.04 K/UL — SIGNIFICANT CHANGE UP (ref 0–0.2)
BASOPHILS NFR BLD AUTO: 0.4 % — SIGNIFICANT CHANGE UP (ref 0–2)
BILIRUB DIRECT SERPL-MCNC: 0.6 MG/DL — HIGH (ref 0–0.3)
BILIRUB INDIRECT FLD-MCNC: 1.6 MG/DL — HIGH (ref 0.2–1)
BILIRUB SERPL-MCNC: 2.2 MG/DL — HIGH (ref 0.4–2)
BUN SERPL-MCNC: 6 MG/DL — LOW (ref 8–20)
CALCIUM SERPL-MCNC: 8.2 MG/DL — LOW (ref 8.6–10.2)
CHLORIDE SERPL-SCNC: 102 MMOL/L — SIGNIFICANT CHANGE UP (ref 98–107)
CO2 SERPL-SCNC: 25 MMOL/L — SIGNIFICANT CHANGE UP (ref 22–29)
CREAT SERPL-MCNC: 0.53 MG/DL — SIGNIFICANT CHANGE UP (ref 0.5–1.3)
EOSINOPHIL # BLD AUTO: 0.23 K/UL — SIGNIFICANT CHANGE UP (ref 0–0.5)
EOSINOPHIL NFR BLD AUTO: 2.5 % — SIGNIFICANT CHANGE UP (ref 0–6)
GLUCOSE SERPL-MCNC: 96 MG/DL — SIGNIFICANT CHANGE UP (ref 70–115)
HCT VFR BLD CALC: 37.1 % — LOW (ref 39–50)
HCT VFR BLD CALC: 37.6 % — LOW (ref 39–50)
HGB BLD-MCNC: 12.6 G/DL — LOW (ref 13–17)
HGB BLD-MCNC: 12.8 G/DL — LOW (ref 13–17)
IMM GRANULOCYTES NFR BLD AUTO: 0.5 % — SIGNIFICANT CHANGE UP (ref 0–1.5)
INR BLD: 1.13 RATIO — SIGNIFICANT CHANGE UP (ref 0.88–1.16)
LIDOCAIN IGE QN: 25 U/L — SIGNIFICANT CHANGE UP (ref 22–51)
LYMPHOCYTES # BLD AUTO: 1.73 K/UL — SIGNIFICANT CHANGE UP (ref 1–3.3)
LYMPHOCYTES # BLD AUTO: 18.7 % — SIGNIFICANT CHANGE UP (ref 13–44)
MAGNESIUM SERPL-MCNC: 1.8 MG/DL — SIGNIFICANT CHANGE UP (ref 1.6–2.6)
MCHC RBC-ENTMCNC: 29.9 PG — SIGNIFICANT CHANGE UP (ref 27–34)
MCHC RBC-ENTMCNC: 30 PG — SIGNIFICANT CHANGE UP (ref 27–34)
MCHC RBC-ENTMCNC: 34 GM/DL — SIGNIFICANT CHANGE UP (ref 32–36)
MCHC RBC-ENTMCNC: 34 GM/DL — SIGNIFICANT CHANGE UP (ref 32–36)
MCV RBC AUTO: 88.1 FL — SIGNIFICANT CHANGE UP (ref 80–100)
MCV RBC AUTO: 88.3 FL — SIGNIFICANT CHANGE UP (ref 80–100)
MONOCYTES # BLD AUTO: 0.98 K/UL — HIGH (ref 0–0.9)
MONOCYTES NFR BLD AUTO: 10.6 % — SIGNIFICANT CHANGE UP (ref 2–14)
NEUTROPHILS # BLD AUTO: 6.2 K/UL — SIGNIFICANT CHANGE UP (ref 1.8–7.4)
NEUTROPHILS NFR BLD AUTO: 67.3 % — SIGNIFICANT CHANGE UP (ref 43–77)
PHOSPHATE SERPL-MCNC: 1.9 MG/DL — LOW (ref 2.4–4.7)
PLATELET # BLD AUTO: 189 K/UL — SIGNIFICANT CHANGE UP (ref 150–400)
PLATELET # BLD AUTO: 209 K/UL — SIGNIFICANT CHANGE UP (ref 150–400)
POTASSIUM SERPL-MCNC: 3.9 MMOL/L — SIGNIFICANT CHANGE UP (ref 3.5–5.3)
POTASSIUM SERPL-SCNC: 3.9 MMOL/L — SIGNIFICANT CHANGE UP (ref 3.5–5.3)
PROT SERPL-MCNC: 5.7 G/DL — LOW (ref 6.6–8.7)
PROTHROM AB SERPL-ACNC: 13 SEC — HIGH (ref 10–12.9)
RBC # BLD: 4.21 M/UL — SIGNIFICANT CHANGE UP (ref 4.2–5.8)
RBC # BLD: 4.26 M/UL — SIGNIFICANT CHANGE UP (ref 4.2–5.8)
RBC # FLD: 12.9 % — SIGNIFICANT CHANGE UP (ref 10.3–14.5)
RBC # FLD: 13.1 % — SIGNIFICANT CHANGE UP (ref 10.3–14.5)
SODIUM SERPL-SCNC: 139 MMOL/L — SIGNIFICANT CHANGE UP (ref 135–145)
WBC # BLD: 7.91 K/UL — SIGNIFICANT CHANGE UP (ref 3.8–10.5)
WBC # BLD: 9.23 K/UL — SIGNIFICANT CHANGE UP (ref 3.8–10.5)
WBC # FLD AUTO: 7.91 K/UL — SIGNIFICANT CHANGE UP (ref 3.8–10.5)
WBC # FLD AUTO: 9.23 K/UL — SIGNIFICANT CHANGE UP (ref 3.8–10.5)

## 2019-11-05 PROCEDURE — 99233 SBSQ HOSP IP/OBS HIGH 50: CPT | Mod: 25

## 2019-11-05 PROCEDURE — 43264 ERCP REMOVE DUCT CALCULI: CPT

## 2019-11-05 RX ORDER — HEPARIN SODIUM 5000 [USP'U]/ML
4000 INJECTION INTRAVENOUS; SUBCUTANEOUS EVERY 6 HOURS
Refills: 0 | Status: DISCONTINUED | OUTPATIENT
Start: 2019-11-05 | End: 2019-11-06

## 2019-11-05 RX ORDER — HEPARIN SODIUM 5000 [USP'U]/ML
INJECTION INTRAVENOUS; SUBCUTANEOUS
Qty: 25000 | Refills: 0 | Status: DISCONTINUED | OUTPATIENT
Start: 2019-11-05 | End: 2019-11-06

## 2019-11-05 RX ORDER — ERTAPENEM SODIUM 1 G/1
1000 INJECTION, POWDER, LYOPHILIZED, FOR SOLUTION INTRAMUSCULAR; INTRAVENOUS ONCE
Refills: 0 | Status: COMPLETED | OUTPATIENT
Start: 2019-11-05 | End: 2019-11-05

## 2019-11-05 RX ORDER — HEPARIN SODIUM 5000 [USP'U]/ML
8000 INJECTION INTRAVENOUS; SUBCUTANEOUS EVERY 6 HOURS
Refills: 0 | Status: DISCONTINUED | OUTPATIENT
Start: 2019-11-05 | End: 2019-11-06

## 2019-11-05 RX ORDER — ENOXAPARIN SODIUM 100 MG/ML
40 INJECTION SUBCUTANEOUS DAILY
Refills: 0 | Status: DISCONTINUED | OUTPATIENT
Start: 2019-11-05 | End: 2019-11-05

## 2019-11-05 RX ADMIN — HEPARIN SODIUM 1800 UNIT(S)/HR: 5000 INJECTION INTRAVENOUS; SUBCUTANEOUS at 15:29

## 2019-11-05 RX ADMIN — Medication 62.5 MILLIMOLE(S): at 15:17

## 2019-11-05 RX ADMIN — HEPARIN SODIUM 4000 UNIT(S): 5000 INJECTION INTRAVENOUS; SUBCUTANEOUS at 22:40

## 2019-11-05 RX ADMIN — Medication 100 MILLIGRAM(S): at 15:17

## 2019-11-05 RX ADMIN — SODIUM CHLORIDE 125 MILLILITER(S): 9 INJECTION, SOLUTION INTRAVENOUS at 17:14

## 2019-11-05 RX ADMIN — ERTAPENEM SODIUM 120 MILLIGRAM(S): 1 INJECTION, POWDER, LYOPHILIZED, FOR SOLUTION INTRAMUSCULAR; INTRAVENOUS at 15:16

## 2019-11-05 RX ADMIN — HEPARIN SODIUM 2000 UNIT(S)/HR: 5000 INJECTION INTRAVENOUS; SUBCUTANEOUS at 22:27

## 2019-11-05 NOTE — PROGRESS NOTE ADULT - SUBJECTIVE AND OBJECTIVE BOX
INTERVAL HPI/OVERNIGHT EVENTS:  No acute events overnight. Patient evaluated at bedside and found hemodynamically stable and in no acute distress. Schedule for ERCP today, heparin drip held at midnight and NPO. Denies fevers, chill, chest pain sob, nausea vomiting or generalized malaise.    SUBJECTIVE:      MEDICATIONS  (STANDING):  indomethacin Suppository 100 milliGRAM(s) Rectal once  lactated ringers. 1000 milliLiter(s) (125 mL/Hr) IV Continuous <Continuous>    MEDICATIONS  (PRN):  morphine  - Injectable 2 milliGRAM(s) IV Push every 4 hours PRN Severe Pain (7 - 10)  morphine  - Injectable 1 milliGRAM(s) IV Push every 4 hours PRN Moderate Pain (4 - 6)  morphine  - Injectable 0.5 milliGRAM(s) IV Push every 4 hours PRN Mild Pain (1 - 3)  ondansetron Injectable 4 milliGRAM(s) IV Push every 4 hours PRN Nausea and/or Vomiting      Vital Signs Last 24 Hrs  T(C): 37.2 (05 Nov 2019 01:11), Max: 37.2 (04 Nov 2019 15:46)  T(F): 98.9 (05 Nov 2019 01:11), Max: 98.9 (04 Nov 2019 15:46)  HR: 79 (05 Nov 2019 01:11) (79 - 80)  BP: 134/83 (05 Nov 2019 01:11) (132/73 - 135/81)  BP(mean): --  RR: 20 (05 Nov 2019 01:11) (18 - 20)  SpO2: 90% (05 Nov 2019 01:11) (90% - 97%)    PHYSICAL EXAM:      General: comfortably in bed in no acute distress  Chest: non-labored breathing  Abdomen: non-distended, soft and depressible, mildly tenderness to palpation in RUQ      I&O's Detail    03 Nov 2019 07:01  -  04 Nov 2019 07:00  --------------------------------------------------------  IN:    heparin  Infusion.: 332 mL    lactated ringers.: 2875 mL  Total IN: 3207 mL    OUT:    Voided: 2302 mL  Total OUT: 2302 mL    Total NET: 905 mL      04 Nov 2019 07:01  -  05 Nov 2019 06:49  --------------------------------------------------------  IN:    heparin  Infusion.: 206 mL    lactated ringers.: 2125 mL  Total IN: 2331 mL    OUT:  Total OUT: 0 mL    Total NET: 2331 mL          LABS:                        13.5   11.19 )-----------( 180      ( 04 Nov 2019 18:03 )             40.2     11-04    139  |  100  |  6.0<L>  ----------------------------<  96  3.3<L>   |  25.0  |  0.53    Ca    8.5<L>      04 Nov 2019 07:49  Phos  2.3     11-04  Mg     1.7     11-04    TPro  5.7<L>  /  Alb  3.3  /  TBili  2.4<H>  /  DBili  0.4<H>  /  AST  24  /  ALT  118<H>  /  AlkPhos  152<H>  11-04    PT/INR - ( 04 Nov 2019 11:10 )   PT: 13.7 sec;   INR: 1.19 ratio         PTT - ( 04 Nov 2019 18:03 )  PTT:39.5 sec

## 2019-11-05 NOTE — PROGRESS NOTE ADULT - SUBJECTIVE AND OBJECTIVE BOX
INTERVAL HPI/OVERNIGHT EVENTS: Poor appetite this am. Has some abdominal pain when he moves. No fever or chills.     ROS wnl     PAST MEDICAL & SURGICAL HISTORY:  Hypertension  A-fib  No significant past surgical history      Home Medications:  Aspir 81:  (08 May 2018 09:29)  Bystolic:  (08 May 2018 09:29)  enalapril:  (08 May 2018 09:29)  Pradaxa:  (08 May 2018 09:29)      MEDICATIONS  (STANDING):  indomethacin Suppository 100 milliGRAM(s) Rectal once  lactated ringers. 1000 milliLiter(s) (125 mL/Hr) IV Continuous <Continuous>    MEDICATIONS  (PRN):  morphine  - Injectable 2 milliGRAM(s) IV Push every 4 hours PRN Severe Pain (7 - 10)  morphine  - Injectable 1 milliGRAM(s) IV Push every 4 hours PRN Moderate Pain (4 - 6)  morphine  - Injectable 0.5 milliGRAM(s) IV Push every 4 hours PRN Mild Pain (1 - 3)  ondansetron Injectable 4 milliGRAM(s) IV Push every 4 hours PRN Nausea and/or Vomiting      Allergies    No Known Allergies    Intolerances          PHYSICAL EXAM:   Vital Signs:  Vital Signs Last 24 Hrs  T(C): 37.2 (05 Nov 2019 01:11), Max: 37.2 (04 Nov 2019 15:46)  T(F): 98.9 (05 Nov 2019 01:11), Max: 98.9 (04 Nov 2019 15:46)  HR: 79 (05 Nov 2019 01:11) (79 - 80)  BP: 134/83 (05 Nov 2019 01:11) (132/73 - 135/81)  BP(mean): --  RR: 20 (05 Nov 2019 01:11) (18 - 20)  SpO2: 90% (05 Nov 2019 01:11) (90% - 97%)  Daily     Daily     GENERAL:  no distress  HEENT:  NC/AT,  anicteric  ABDOMEN:  Soft, non-tender, non-distended, normoactive bowel sounds,  no masses  EXTREMITIES  no cyanosis      LABS:                        12.8   9.23  )-----------( 189      ( 05 Nov 2019 07:25 )             37.6       Hemoglobin: 12.8 g/dL (11-05-19 @ 07:25)  Hemoglobin: 13.5 g/dL (11-04-19 @ 18:03)  Hemoglobin: 13.5 g/dL (11-04-19 @ 07:49)  Hemoglobin: 13.9 g/dL (11-03-19 @ 06:13)  Hemoglobin: 16.0 g/dL (11-02-19 @ 19:45)      11-04    139  |  100  |  6.0<L>  ----------------------------<  96  3.3<L>   |  25.0  |  0.53    Ca    8.5<L>      04 Nov 2019 07:49  Phos  2.3     11-04  Mg     1.7     11-04    TPro  5.7<L>  /  Alb  3.3  /  TBili  2.4<H>  /  DBili  0.4<H>  /  AST  24  /  ALT  118<H>  /  AlkPhos  152<H>  11-04      INR: 1.19 ratio (11-04-19 @ 11:10)  INR: 1.24 ratio (11-04-19 @ 07:49)  INR: 1.08 ratio (11-02-19 @ 11:28)      Bilirubin Direct, Serum: 0.4 mg/dL (11-04-19 @ 07:49)  Alanine Aminotransferase (ALT/SGPT): 118 U/L (11-04-19 @ 07:49)  Alkaline Phosphatase, Serum: 152 U/L (11-04-19 @ 07:49)  Aspartate Aminotransferase (AST/SGOT): 24 U/L (11-04-19 @ 07:49)  Bilirubin Direct, Serum: 0.4 mg/dL (11-03-19 @ 06:13)  Alanine Aminotransferase (ALT/SGPT): 197 U/L (11-03-19 @ 06:13)  Alkaline Phosphatase, Serum: 184 U/L (11-03-19 @ 06:13)  Aspartate Aminotransferase (AST/SGOT): 62 U/L (11-03-19 @ 06:13)      RADIOLOGY & ADDITIONAL TESTS:  < from: MR MRCP No Cont (11.02.19 @ 17:29) >  Findings:    The liver demonstrates normal signal. There are scattered subcentimeter   hyperintensities in the liver.    Intrahepatic and extrahepatic bile ducts are normal in caliber. Common   bile duct measures 4 mm. There is a 2 mm stone or sludge ball in the   distal common bile duct.    The gallbladder contains gallstones.    The pancreas has heterogeneous signal intensity with peripancreatic edema   compatible with pancreatitis. Trace left upper quadrant ascites.    The spleen is normal.     The adrenal glands are normal.      The kidneys demonstrate probable bilateral renal cysts measuring up to   5.4 cm on the right.    Lymph nodes are normal in size.        IMPRESSION:     No intrahepatic or extrahepatic biliary ductal dilatation.     2 mm stone or sludge ball in the common bile duct.    Gallstones.    Findings compatible with pancreatitis.     < end of copied text >

## 2019-11-05 NOTE — PROGRESS NOTE ADULT - ASSESSMENT
59 year old with working diagnosis gallstone pancreatitis currently afebrile and hemodynamically stable MRCP showing choledocholithiasis  -Heparin Nomogram, held for ERCP with GI today  - f/u Gi after ERCP  -LFT's AM  -POC after ERCP  -Possible OR tomorrow for lap vs open cholecystectomy

## 2019-11-06 ENCOUNTER — RESULT REVIEW (OUTPATIENT)
Age: 59
End: 2019-11-06

## 2019-11-06 LAB
ALBUMIN SERPL ELPH-MCNC: 3.1 G/DL — LOW (ref 3.3–5.2)
ALP SERPL-CCNC: 199 U/L — HIGH (ref 40–120)
ALT FLD-CCNC: 76 U/L — HIGH
ANION GAP SERPL CALC-SCNC: 12 MMOL/L — SIGNIFICANT CHANGE UP (ref 5–17)
APTT BLD: 26.5 SEC — LOW (ref 27.5–36.3)
APTT BLD: 66.3 SEC — HIGH (ref 27.5–36.3)
AST SERPL-CCNC: 20 U/L — SIGNIFICANT CHANGE UP
BASOPHILS # BLD AUTO: 0.01 K/UL — SIGNIFICANT CHANGE UP (ref 0–0.2)
BASOPHILS NFR BLD AUTO: 0.1 % — SIGNIFICANT CHANGE UP (ref 0–2)
BILIRUB SERPL-MCNC: 1.4 MG/DL — SIGNIFICANT CHANGE UP (ref 0.4–2)
BUN SERPL-MCNC: 9 MG/DL — SIGNIFICANT CHANGE UP (ref 8–20)
CALCIUM SERPL-MCNC: 8.5 MG/DL — LOW (ref 8.6–10.2)
CHLORIDE SERPL-SCNC: 104 MMOL/L — SIGNIFICANT CHANGE UP (ref 98–107)
CO2 SERPL-SCNC: 25 MMOL/L — SIGNIFICANT CHANGE UP (ref 22–29)
CREAT SERPL-MCNC: 0.61 MG/DL — SIGNIFICANT CHANGE UP (ref 0.5–1.3)
EOSINOPHIL # BLD AUTO: 0.01 K/UL — SIGNIFICANT CHANGE UP (ref 0–0.5)
EOSINOPHIL NFR BLD AUTO: 0.1 % — SIGNIFICANT CHANGE UP (ref 0–6)
GLUCOSE SERPL-MCNC: 123 MG/DL — HIGH (ref 70–115)
HCT VFR BLD CALC: 37.3 % — LOW (ref 39–50)
HGB BLD-MCNC: 12.7 G/DL — LOW (ref 13–17)
IMM GRANULOCYTES NFR BLD AUTO: 0.9 % — SIGNIFICANT CHANGE UP (ref 0–1.5)
INR BLD: 1.15 RATIO — SIGNIFICANT CHANGE UP (ref 0.88–1.16)
LYMPHOCYTES # BLD AUTO: 1.42 K/UL — SIGNIFICANT CHANGE UP (ref 1–3.3)
LYMPHOCYTES # BLD AUTO: 16.2 % — SIGNIFICANT CHANGE UP (ref 13–44)
MAGNESIUM SERPL-MCNC: 1.9 MG/DL — SIGNIFICANT CHANGE UP (ref 1.6–2.6)
MCHC RBC-ENTMCNC: 29.9 PG — SIGNIFICANT CHANGE UP (ref 27–34)
MCHC RBC-ENTMCNC: 34 GM/DL — SIGNIFICANT CHANGE UP (ref 32–36)
MCV RBC AUTO: 87.8 FL — SIGNIFICANT CHANGE UP (ref 80–100)
MONOCYTES # BLD AUTO: 0.61 K/UL — SIGNIFICANT CHANGE UP (ref 0–0.9)
MONOCYTES NFR BLD AUTO: 6.9 % — SIGNIFICANT CHANGE UP (ref 2–14)
NEUTROPHILS # BLD AUTO: 6.65 K/UL — SIGNIFICANT CHANGE UP (ref 1.8–7.4)
NEUTROPHILS NFR BLD AUTO: 75.8 % — SIGNIFICANT CHANGE UP (ref 43–77)
PHOSPHATE SERPL-MCNC: 4 MG/DL — SIGNIFICANT CHANGE UP (ref 2.4–4.7)
PLATELET # BLD AUTO: 205 K/UL — SIGNIFICANT CHANGE UP (ref 150–400)
POTASSIUM SERPL-MCNC: 5.1 MMOL/L — SIGNIFICANT CHANGE UP (ref 3.5–5.3)
POTASSIUM SERPL-SCNC: 5.1 MMOL/L — SIGNIFICANT CHANGE UP (ref 3.5–5.3)
PROT SERPL-MCNC: 5.8 G/DL — LOW (ref 6.6–8.7)
PROTHROM AB SERPL-ACNC: 13.3 SEC — HIGH (ref 10–12.9)
RBC # BLD: 4.25 M/UL — SIGNIFICANT CHANGE UP (ref 4.2–5.8)
RBC # FLD: 12.9 % — SIGNIFICANT CHANGE UP (ref 10.3–14.5)
SODIUM SERPL-SCNC: 141 MMOL/L — SIGNIFICANT CHANGE UP (ref 135–145)
WBC # BLD: 8.78 K/UL — SIGNIFICANT CHANGE UP (ref 3.8–10.5)
WBC # FLD AUTO: 8.78 K/UL — SIGNIFICANT CHANGE UP (ref 3.8–10.5)

## 2019-11-06 PROCEDURE — 99231 SBSQ HOSP IP/OBS SF/LOW 25: CPT

## 2019-11-06 PROCEDURE — 88304 TISSUE EXAM BY PATHOLOGIST: CPT | Mod: 26

## 2019-11-06 PROCEDURE — 99233 SBSQ HOSP IP/OBS HIGH 50: CPT

## 2019-11-06 RX ORDER — OXYCODONE AND ACETAMINOPHEN 5; 325 MG/1; MG/1
2 TABLET ORAL EVERY 6 HOURS
Refills: 0 | Status: DISCONTINUED | OUTPATIENT
Start: 2019-11-06 | End: 2019-11-07

## 2019-11-06 RX ORDER — INFLUENZA VIRUS VACCINE 15; 15; 15; 15 UG/.5ML; UG/.5ML; UG/.5ML; UG/.5ML
0.5 SUSPENSION INTRAMUSCULAR ONCE
Refills: 0 | Status: COMPLETED | OUTPATIENT
Start: 2019-11-06 | End: 2019-11-06

## 2019-11-06 RX ORDER — FENTANYL CITRATE 50 UG/ML
25 INJECTION INTRAVENOUS
Refills: 0 | Status: DISCONTINUED | OUTPATIENT
Start: 2019-11-06 | End: 2019-11-06

## 2019-11-06 RX ORDER — OXYCODONE AND ACETAMINOPHEN 5; 325 MG/1; MG/1
1 TABLET ORAL EVERY 6 HOURS
Refills: 0 | Status: DISCONTINUED | OUTPATIENT
Start: 2019-11-06 | End: 2019-11-07

## 2019-11-06 RX ORDER — HEPARIN SODIUM 5000 [USP'U]/ML
INJECTION INTRAVENOUS; SUBCUTANEOUS
Qty: 25000 | Refills: 0 | Status: DISCONTINUED | OUTPATIENT
Start: 2019-11-06 | End: 2019-11-07

## 2019-11-06 RX ORDER — ONDANSETRON 8 MG/1
4 TABLET, FILM COATED ORAL ONCE
Refills: 0 | Status: DISCONTINUED | OUTPATIENT
Start: 2019-11-06 | End: 2019-11-06

## 2019-11-06 RX ORDER — HYDROMORPHONE HYDROCHLORIDE 2 MG/ML
0.5 INJECTION INTRAMUSCULAR; INTRAVENOUS; SUBCUTANEOUS
Refills: 0 | Status: DISCONTINUED | OUTPATIENT
Start: 2019-11-06 | End: 2019-11-06

## 2019-11-06 RX ORDER — SODIUM CHLORIDE 9 MG/ML
1000 INJECTION, SOLUTION INTRAVENOUS
Refills: 0 | Status: DISCONTINUED | OUTPATIENT
Start: 2019-11-06 | End: 2019-11-06

## 2019-11-06 RX ADMIN — HEPARIN SODIUM 1800 UNIT(S)/HR: 5000 INJECTION INTRAVENOUS; SUBCUTANEOUS at 21:54

## 2019-11-06 RX ADMIN — ONDANSETRON 4 MILLIGRAM(S): 8 TABLET, FILM COATED ORAL at 15:30

## 2019-11-06 RX ADMIN — HYDROMORPHONE HYDROCHLORIDE 0.5 MILLIGRAM(S): 2 INJECTION INTRAMUSCULAR; INTRAVENOUS; SUBCUTANEOUS at 16:16

## 2019-11-06 RX ADMIN — HYDROMORPHONE HYDROCHLORIDE 0.5 MILLIGRAM(S): 2 INJECTION INTRAMUSCULAR; INTRAVENOUS; SUBCUTANEOUS at 16:00

## 2019-11-06 RX ADMIN — OXYCODONE AND ACETAMINOPHEN 2 TABLET(S): 5; 325 TABLET ORAL at 22:23

## 2019-11-06 RX ADMIN — HYDROMORPHONE HYDROCHLORIDE 0.5 MILLIGRAM(S): 2 INJECTION INTRAMUSCULAR; INTRAVENOUS; SUBCUTANEOUS at 15:47

## 2019-11-06 RX ADMIN — OXYCODONE AND ACETAMINOPHEN 2 TABLET(S): 5; 325 TABLET ORAL at 22:08

## 2019-11-06 RX ADMIN — HYDROMORPHONE HYDROCHLORIDE 0.5 MILLIGRAM(S): 2 INJECTION INTRAMUSCULAR; INTRAVENOUS; SUBCUTANEOUS at 15:30

## 2019-11-06 RX ADMIN — OXYCODONE AND ACETAMINOPHEN 2 TABLET(S): 5; 325 TABLET ORAL at 17:04

## 2019-11-06 RX ADMIN — MORPHINE SULFATE 1 MILLIGRAM(S): 50 CAPSULE, EXTENDED RELEASE ORAL at 11:58

## 2019-11-06 RX ADMIN — OXYCODONE AND ACETAMINOPHEN 2 TABLET(S): 5; 325 TABLET ORAL at 16:00

## 2019-11-06 NOTE — PROGRESS NOTE ADULT - PROBLEM SELECTOR PLAN 1
AM labs pending possible ERCP today   cont IVF
Pt. too tender for ERCP and a small 2mm. stone vs. sludge ball should pass spontaneously. Will hold off on ERCP today given that he is still symptomatic with regard to his pancreatitis. Clear liquid diet ordered today and will re-consider ERCP tomorrow depending upon how pt. does today. Repeat labs ordered for the AM. Same IVF and rate today.
s/p ERCP with removal of stone and sludge  monitor LFTS  timing of cholecystectomy as per surgery service; can be started on a diet if no surgery planned today
NPO p MN for ERCP tomorrow.  Hold heparin starting at midnight.

## 2019-11-06 NOTE — PROGRESS NOTE ADULT - SUBJECTIVE AND OBJECTIVE BOX
INTERVAL HPI/OVERNIGHT EVENTS: s/p ERCP    SUBJECTIVE: Patient examined at bedside and found in no acute distress. Patient denies pain at present. He is tolerating clear liquid diet without n/v. Abdomen soft, nontender. Patient without complaints or concerns at present. Patient to go to OR today for Lap cholecystectomy. Denies n/v, sob, chest pain, fever/chills, abdominal pain.    MEDICATIONS  (STANDING):  heparin  Infusion.  Unit(s)/Hr (18 mL/Hr) IV Continuous <Continuous>  lactated ringers. 1000 milliLiter(s) (125 mL/Hr) IV Continuous <Continuous>    MEDICATIONS  (PRN):  heparin  Injectable 8000 Unit(s) IV Push every 6 hours PRN For aPTT less than 40  heparin  Injectable 4000 Unit(s) IV Push every 6 hours PRN For aPTT between 40 - 57  morphine  - Injectable 2 milliGRAM(s) IV Push every 4 hours PRN Severe Pain (7 - 10)  morphine  - Injectable 1 milliGRAM(s) IV Push every 4 hours PRN Moderate Pain (4 - 6)  morphine  - Injectable 0.5 milliGRAM(s) IV Push every 4 hours PRN Mild Pain (1 - 3)  ondansetron Injectable 4 milliGRAM(s) IV Push every 4 hours PRN Nausea and/or Vomiting      Vital Signs Last 24 Hrs  T(C): 36.6 (05 Nov 2019 23:29), Max: 36.8 (05 Nov 2019 10:12)  T(F): 97.9 (05 Nov 2019 23:29), Max: 98.2 (05 Nov 2019 10:12)  HR: 58 (05 Nov 2019 23:29) (52 - 79)  BP: 118/74 (05 Nov 2019 23:29) (112/68 - 130/82)  BP(mean): --  RR: 18 (05 Nov 2019 23:29) (18 - 19)  SpO2: 92% (05 Nov 2019 23:29) (92% - 95%)    PE  General: resting comfortably in bed, nad  Pulm: no increased work of breathing, no conversational dyspnea  Abd: soft, non-distended, nontender      I&O's Detail    04 Nov 2019 07:01  -  05 Nov 2019 07:00  --------------------------------------------------------  IN:    heparin  Infusion.: 206 mL    lactated ringers.: 2125 mL  Total IN: 2331 mL    OUT:  Total OUT: 0 mL    Total NET: 2331 mL      05 Nov 2019 07:01  -  06 Nov 2019 01:34  --------------------------------------------------------  IN:    heparin  Infusion.: 90 mL    lactated ringers.: 1000 mL    Solution: 125 mL  Total IN: 1215 mL    OUT:    Voided: 800 mL  Total OUT: 800 mL    Total NET: 415 mL          LABS:                        12.6   7.91  )-----------( 209      ( 05 Nov 2019 21:30 )             37.1     11-05    139  |  102  |  6.0<L>  ----------------------------<  96  3.9   |  25.0  |  0.53    Ca    8.2<L>      05 Nov 2019 07:25  Phos  1.9     11-05  Mg     1.8     11-05    TPro  5.7<L>  /  Alb  3.1<L>  /  TBili  2.2<H>  /  DBili  0.6<H>  /  AST  22  /  ALT  87<H>  /  AlkPhos  168<H>  11-05    PT/INR - ( 05 Nov 2019 07:25 )   PT: 13.0 sec;   INR: 1.13 ratio         PTT - ( 05 Nov 2019 21:21 )  PTT:44.9 sec

## 2019-11-06 NOTE — BRIEF OPERATIVE NOTE - NSICDXBRIEFPOSTOP_GEN_ALL_CORE_FT
POST-OP DIAGNOSIS:  Choledocholithiasis 05-Nov-2019 12:40:32  Kuldeep Strong
POST-OP DIAGNOSIS:  Symptomatic cholelithiasis 06-Nov-2019 20:12:18  Isiah Saunders  Choledocholithiasis 05-Nov-2019 12:40:32  Kuldeep Strong

## 2019-11-06 NOTE — PROGRESS NOTE ADULT - SUBJECTIVE AND OBJECTIVE BOX
INTERVAL HPI/OVERNIGHT EVENTS:  s/p ERCP with removal of a stone and sludge. Feels better this am. No abdominal pain, fevers or chills. Slept well.     ROS wnl     PAST MEDICAL & SURGICAL HISTORY:  Hypertension  A-fib  No significant past surgical history      Home Medications:  Aspir 81:  (08 May 2018 09:29)  Bystolic:  (08 May 2018 09:29)  enalapril:  (08 May 2018 09:29)  Pradaxa:  (08 May 2018 09:29)      MEDICATIONS  (STANDING):  heparin  Infusion.  Unit(s)/Hr (18 mL/Hr) IV Continuous <Continuous>  lactated ringers. 1000 milliLiter(s) (125 mL/Hr) IV Continuous <Continuous>    MEDICATIONS  (PRN):  heparin  Injectable 8000 Unit(s) IV Push every 6 hours PRN For aPTT less than 40  heparin  Injectable 4000 Unit(s) IV Push every 6 hours PRN For aPTT between 40 - 57  morphine  - Injectable 2 milliGRAM(s) IV Push every 4 hours PRN Severe Pain (7 - 10)  morphine  - Injectable 1 milliGRAM(s) IV Push every 4 hours PRN Moderate Pain (4 - 6)  morphine  - Injectable 0.5 milliGRAM(s) IV Push every 4 hours PRN Mild Pain (1 - 3)  ondansetron Injectable 4 milliGRAM(s) IV Push every 4 hours PRN Nausea and/or Vomiting      Allergies    No Known Allergies    Intolerances          PHYSICAL EXAM:   Vital Signs:  Vital Signs Last 24 Hrs  T(C): 36.6 (05 Nov 2019 23:29), Max: 36.8 (05 Nov 2019 10:12)  T(F): 97.9 (05 Nov 2019 23:29), Max: 98.2 (05 Nov 2019 10:12)  HR: 58 (05 Nov 2019 23:29) (52 - 79)  BP: 118/74 (05 Nov 2019 23:29) (112/68 - 130/82)  BP(mean): --  RR: 18 (05 Nov 2019 23:29) (18 - 19)  SpO2: 92% (05 Nov 2019 23:29) (92% - 95%)  Daily     Daily     GENERAL:  no distress  HEENT:  NC/AT,  anicteric  ABDOMEN:  Soft, non-tender, non-distended, normoactive bowel sounds,  no masses   EXTREMITIES  no cyanosis      LABS:                        12.7   8.78  )-----------( 205      ( 06 Nov 2019 04:54 )             37.3       Hemoglobin: 12.7 g/dL (11-06-19 @ 04:54)  Hemoglobin: 12.6 g/dL (11-05-19 @ 21:30)  Hemoglobin: 12.8 g/dL (11-05-19 @ 07:25)  Hemoglobin: 13.5 g/dL (11-04-19 @ 18:03)  Hemoglobin: 13.5 g/dL (11-04-19 @ 07:49)      11-06    141  |  104  |  9.0  ----------------------------<  123<H>  5.1   |  25.0  |  0.61    Ca    8.5<L>      06 Nov 2019 04:54  Phos  4.0     11-06  Mg     1.9     11-06    TPro  5.8<L>  /  Alb  3.1<L>  /  TBili  1.4  /  DBili  x   /  AST  20  /  ALT  76<H>  /  AlkPhos  199<H>  11-06      INR: 1.15 ratio (11-06-19 @ 04:54)  INR: 1.13 ratio (11-05-19 @ 07:25)  INR: 1.19 ratio (11-04-19 @ 11:10)  INR: 1.24 ratio (11-04-19 @ 07:49)      Alkaline Phosphatase, Serum: 199 U/L (11-06-19 @ 04:54)  Alanine Aminotransferase (ALT/SGPT): 76 U/L (11-06-19 @ 04:54)  Aspartate Aminotransferase (AST/SGOT): 20 U/L (11-06-19 @ 04:54)  Aspartate Aminotransferase (AST/SGOT): 22 U/L (11-05-19 @ 07:25)  Alkaline Phosphatase, Serum: 168 U/L (11-05-19 @ 07:25)  Alanine Aminotransferase (ALT/SGPT): 87 U/L (11-05-19 @ 07:25)  Bilirubin Direct, Serum: 0.6 mg/dL (11-05-19 @ 07:25)  Bilirubin Direct, Serum: 0.4 mg/dL (11-04-19 @ 07:49)  Alanine Aminotransferase (ALT/SGPT): 118 U/L (11-04-19 @ 07:49)  Alkaline Phosphatase, Serum: 152 U/L (11-04-19 @ 07:49)  Aspartate Aminotransferase (AST/SGOT): 24 U/L (11-04-19 @ 07:49)      RADIOLOGY & ADDITIONAL TESTS:  < from: MR MRCP No Cont (11.02.19 @ 17:29) >  Findings:    The liver demonstrates normal signal. There are scattered subcentimeter   hyperintensities in the liver.    Intrahepatic and extrahepatic bile ducts are normal in caliber. Common   bile duct measures 4 mm. There is a 2 mm stone or sludge ball in the   distal common bile duct.    The gallbladder contains gallstones.    The pancreas has heterogeneous signal intensity with peripancreatic edema   compatible with pancreatitis. Trace left upper quadrant ascites.    The spleen is normal.     The adrenal glands are normal.      The kidneys demonstrate probable bilateral renal cysts measuring up to   5.4 cm on the right.    Lymph nodes are normal in size.        IMPRESSION:   No intrahepatic or extrahepatic biliary ductal dilatation.   2 mm stone or sludge ball in the common bile duct.  Gallstones.  Findings compatible with pancreatitis.     < end of copied text >

## 2019-11-06 NOTE — PROGRESS NOTE ADULT - ASSESSMENT
A/P: 59 year old with working diagnosis gallstone pancreatitis currently afebrile and hemodynamically stable MRCP showing choledocholithiasis    -Heparin Nomogram, hold for OR today 5am  -LFT's AM  - OR for laparoscopic cholecystectomy today

## 2019-11-06 NOTE — CHART NOTE - NSCHARTNOTEFT_GEN_A_CORE
POST OPERATIVE NOTE    No acute events since surgery. Patient s/p lap cholecystectomy POD #0 evaluated at bedside and found hemodynamically stable and in no acute distress. Patient reports no having pain just soreness, had regular diet which he tolerated well, has justice OOB and ambulating; denies fevers, chills, nausea or vomiting. Patient had some difficulty voiding, likely 2/2 to anesthesia will bladder scan if no spontaneous voiding in 4hrs. Patient's questions regarding postoperative care were answered. Due to h/o lupus anticoagulant patient will be restarted on heparin drip until PTT is therapeutic then switch to home medication pradaxa.     SUBJECTIVE:      MEDICATIONS  (STANDING):  heparin  Infusion.  Unit(s)/Hr (18 mL/Hr) IV Continuous <Continuous>  lactated ringers. 1000 milliLiter(s) (125 mL/Hr) IV Continuous <Continuous>    MEDICATIONS  (PRN):  morphine  - Injectable 2 milliGRAM(s) IV Push every 4 hours PRN Severe Pain (7 - 10)  morphine  - Injectable 0.5 milliGRAM(s) IV Push every 4 hours PRN Mild Pain (1 - 3)  ondansetron Injectable 4 milliGRAM(s) IV Push every 4 hours PRN Nausea and/or Vomiting  oxycodone    5 mG/acetaminophen 325 mG 1 Tablet(s) Oral every 6 hours PRN Mild Pain (1 - 3)  oxycodone    5 mG/acetaminophen 325 mG 2 Tablet(s) Oral every 6 hours PRN Moderate Pain (4 - 6)      Vital Signs Last 24 Hrs  T(C): 36.9 (06 Nov 2019 19:55), Max: 37.3 (06 Nov 2019 15:17)  T(F): 98.5 (06 Nov 2019 19:55), Max: 99.2 (06 Nov 2019 15:17)  HR: 68 (06 Nov 2019 19:55) (58 - 78)  BP: 148/76 (06 Nov 2019 19:55) (118/74 - 158/83)  BP(mean): --  RR: 20 (06 Nov 2019 19:55) (13 - 20)  SpO2: 97% (06 Nov 2019 19:55) (92% - 99%)    PHYSICAL EXAM:    General: no acute distress   Chest: non-labored breathing  Abdomen: no distended soft and depressible, minimal tenderness to palpation in RUQ no rebound or guarding. Surgical wounds c/d/i          I&O's Detail    05 Nov 2019 07:01  -  06 Nov 2019 07:00  --------------------------------------------------------  IN:    heparin  Infusion.: 306 mL    lactated ringers.: 2375 mL    Solution: 125 mL  Total IN: 2806 mL    OUT:    Voided: 800 mL  Total OUT: 800 mL    Total NET: 2006 mL          LABS:                        12.7   8.78  )-----------( 205      ( 06 Nov 2019 04:54 )             37.3     11-06    141  |  104  |  9.0  ----------------------------<  123<H>  5.1   |  25.0  |  0.61    Ca    8.5<L>      06 Nov 2019 04:54  Phos  4.0     11-06  Mg     1.9     11-06    TPro  5.8<L>  /  Alb  3.1<L>  /  TBili  1.4  /  DBili  x   /  AST  20  /  ALT  76<H>  /  AlkPhos  199<H>  11-06    PT/INR - ( 06 Nov 2019 04:54 )   PT: 13.3 sec;   INR: 1.15 ratio         PTT - ( 06 Nov 2019 04:54 )  PTT:66.3 sec        ASSESSMENT AND PLAN:   59 year old with diagnosis gallstone pancreatitis confirmed by MRCP s/p ERCP 11/5 and s/p lap cholecystectomy 11/6, currently afebrile and hemodynamically stable.     -Resume Heparin Nomogram at 8pm  -When PTT therapeutic, switch to Pradaxa home medication  -Possible d/c 11/7

## 2019-11-06 NOTE — BRIEF OPERATIVE NOTE - OPERATION/FINDINGS
cholangiogram - CBD was ~ 6 mm, filling defect noted  s/p sphincterotomy   balloon sweeps removal of small stone and sludge
- gallbladder with stones, mildly distended  - critical view obtained, cystic duct and artery identified  - gallbladder dissected off liver bed, with good hemostasis

## 2019-11-06 NOTE — BRIEF OPERATIVE NOTE - NSICDXBRIEFPREOP_GEN_ALL_CORE_FT
PRE-OP DIAGNOSIS:  Choledocholithiasis 05-Nov-2019 12:40:23  Kuldeep Strong
PRE-OP DIAGNOSIS:  Choledocholithiasis 05-Nov-2019 12:40:23  Kuldeep Strong

## 2019-11-07 ENCOUNTER — TRANSCRIPTION ENCOUNTER (OUTPATIENT)
Age: 59
End: 2019-11-07

## 2019-11-07 VITALS — OXYGEN SATURATION: 96 %

## 2019-11-07 LAB
ANION GAP SERPL CALC-SCNC: 11 MMOL/L — SIGNIFICANT CHANGE UP (ref 5–17)
APTT BLD: 44.7 SEC — HIGH (ref 27.5–36.3)
APTT BLD: 51.5 SEC — HIGH (ref 27.5–36.3)
BASOPHILS # BLD AUTO: 0.03 K/UL — SIGNIFICANT CHANGE UP (ref 0–0.2)
BASOPHILS NFR BLD AUTO: 0.3 % — SIGNIFICANT CHANGE UP (ref 0–2)
BUN SERPL-MCNC: 9 MG/DL — SIGNIFICANT CHANGE UP (ref 8–20)
CALCIUM SERPL-MCNC: 8.4 MG/DL — LOW (ref 8.6–10.2)
CHLORIDE SERPL-SCNC: 102 MMOL/L — SIGNIFICANT CHANGE UP (ref 98–107)
CO2 SERPL-SCNC: 26 MMOL/L — SIGNIFICANT CHANGE UP (ref 22–29)
CREAT SERPL-MCNC: 0.68 MG/DL — SIGNIFICANT CHANGE UP (ref 0.5–1.3)
EOSINOPHIL # BLD AUTO: 0.13 K/UL — SIGNIFICANT CHANGE UP (ref 0–0.5)
EOSINOPHIL NFR BLD AUTO: 1.4 % — SIGNIFICANT CHANGE UP (ref 0–6)
GLUCOSE SERPL-MCNC: 117 MG/DL — HIGH (ref 70–115)
HCT VFR BLD CALC: 35.7 % — LOW (ref 39–50)
HCT VFR BLD CALC: 36.4 % — LOW (ref 39–50)
HGB BLD-MCNC: 12 G/DL — LOW (ref 13–17)
HGB BLD-MCNC: 12.2 G/DL — LOW (ref 13–17)
IMM GRANULOCYTES NFR BLD AUTO: 0.6 % — SIGNIFICANT CHANGE UP (ref 0–1.5)
LYMPHOCYTES # BLD AUTO: 1.89 K/UL — SIGNIFICANT CHANGE UP (ref 1–3.3)
LYMPHOCYTES # BLD AUTO: 20.3 % — SIGNIFICANT CHANGE UP (ref 13–44)
MAGNESIUM SERPL-MCNC: 1.8 MG/DL — SIGNIFICANT CHANGE UP (ref 1.6–2.6)
MCHC RBC-ENTMCNC: 29.8 PG — SIGNIFICANT CHANGE UP (ref 27–34)
MCHC RBC-ENTMCNC: 29.9 PG — SIGNIFICANT CHANGE UP (ref 27–34)
MCHC RBC-ENTMCNC: 33.5 GM/DL — SIGNIFICANT CHANGE UP (ref 32–36)
MCHC RBC-ENTMCNC: 33.6 GM/DL — SIGNIFICANT CHANGE UP (ref 32–36)
MCV RBC AUTO: 88.8 FL — SIGNIFICANT CHANGE UP (ref 80–100)
MCV RBC AUTO: 88.8 FL — SIGNIFICANT CHANGE UP (ref 80–100)
MONOCYTES # BLD AUTO: 0.83 K/UL — SIGNIFICANT CHANGE UP (ref 0–0.9)
MONOCYTES NFR BLD AUTO: 8.9 % — SIGNIFICANT CHANGE UP (ref 2–14)
NEUTROPHILS # BLD AUTO: 6.36 K/UL — SIGNIFICANT CHANGE UP (ref 1.8–7.4)
NEUTROPHILS NFR BLD AUTO: 68.5 % — SIGNIFICANT CHANGE UP (ref 43–77)
PHOSPHATE SERPL-MCNC: 3.9 MG/DL — SIGNIFICANT CHANGE UP (ref 2.4–4.7)
PLATELET # BLD AUTO: 189 K/UL — SIGNIFICANT CHANGE UP (ref 150–400)
PLATELET # BLD AUTO: 202 K/UL — SIGNIFICANT CHANGE UP (ref 150–400)
POTASSIUM SERPL-MCNC: 4 MMOL/L — SIGNIFICANT CHANGE UP (ref 3.5–5.3)
POTASSIUM SERPL-SCNC: 4 MMOL/L — SIGNIFICANT CHANGE UP (ref 3.5–5.3)
RBC # BLD: 4.02 M/UL — LOW (ref 4.2–5.8)
RBC # BLD: 4.1 M/UL — LOW (ref 4.2–5.8)
RBC # FLD: 13 % — SIGNIFICANT CHANGE UP (ref 10.3–14.5)
RBC # FLD: 13 % — SIGNIFICANT CHANGE UP (ref 10.3–14.5)
SODIUM SERPL-SCNC: 139 MMOL/L — SIGNIFICANT CHANGE UP (ref 135–145)
WBC # BLD: 9.06 K/UL — SIGNIFICANT CHANGE UP (ref 3.8–10.5)
WBC # BLD: 9.3 K/UL — SIGNIFICANT CHANGE UP (ref 3.8–10.5)
WBC # FLD AUTO: 9.06 K/UL — SIGNIFICANT CHANGE UP (ref 3.8–10.5)
WBC # FLD AUTO: 9.3 K/UL — SIGNIFICANT CHANGE UP (ref 3.8–10.5)

## 2019-11-07 PROCEDURE — 85730 THROMBOPLASTIN TIME PARTIAL: CPT

## 2019-11-07 PROCEDURE — 74181 MRI ABDOMEN W/O CONTRAST: CPT

## 2019-11-07 PROCEDURE — 71045 X-RAY EXAM CHEST 1 VIEW: CPT

## 2019-11-07 PROCEDURE — 93005 ELECTROCARDIOGRAM TRACING: CPT

## 2019-11-07 PROCEDURE — 87040 BLOOD CULTURE FOR BACTERIA: CPT

## 2019-11-07 PROCEDURE — 83690 ASSAY OF LIPASE: CPT

## 2019-11-07 PROCEDURE — C1769: CPT

## 2019-11-07 PROCEDURE — C1773: CPT

## 2019-11-07 PROCEDURE — 96375 TX/PRO/DX INJ NEW DRUG ADDON: CPT

## 2019-11-07 PROCEDURE — 80053 COMPREHEN METABOLIC PANEL: CPT

## 2019-11-07 PROCEDURE — 85610 PROTHROMBIN TIME: CPT

## 2019-11-07 PROCEDURE — 99285 EMERGENCY DEPT VISIT HI MDM: CPT | Mod: 25

## 2019-11-07 PROCEDURE — 86900 BLOOD TYPING SEROLOGIC ABO: CPT

## 2019-11-07 PROCEDURE — 82150 ASSAY OF AMYLASE: CPT

## 2019-11-07 PROCEDURE — 84100 ASSAY OF PHOSPHORUS: CPT

## 2019-11-07 PROCEDURE — 85027 COMPLETE CBC AUTOMATED: CPT

## 2019-11-07 PROCEDURE — 86850 RBC ANTIBODY SCREEN: CPT

## 2019-11-07 PROCEDURE — 83735 ASSAY OF MAGNESIUM: CPT

## 2019-11-07 PROCEDURE — 80048 BASIC METABOLIC PNL TOTAL CA: CPT

## 2019-11-07 PROCEDURE — 86803 HEPATITIS C AB TEST: CPT

## 2019-11-07 PROCEDURE — 80076 HEPATIC FUNCTION PANEL: CPT

## 2019-11-07 PROCEDURE — 86901 BLOOD TYPING SEROLOGIC RH(D): CPT

## 2019-11-07 PROCEDURE — 36415 COLL VENOUS BLD VENIPUNCTURE: CPT

## 2019-11-07 PROCEDURE — 88304 TISSUE EXAM BY PATHOLOGIST: CPT

## 2019-11-07 PROCEDURE — 96374 THER/PROPH/DIAG INJ IV PUSH: CPT | Mod: XU

## 2019-11-07 PROCEDURE — 82248 BILIRUBIN DIRECT: CPT

## 2019-11-07 PROCEDURE — 74330 X-RAY BILE/PANC ENDOSCOPY: CPT

## 2019-11-07 PROCEDURE — 76705 ECHO EXAM OF ABDOMEN: CPT

## 2019-11-07 PROCEDURE — 74177 CT ABD & PELVIS W/CONTRAST: CPT

## 2019-11-07 RX ORDER — OXYCODONE HYDROCHLORIDE 5 MG/1
1 TABLET ORAL
Qty: 20 | Refills: 0
Start: 2019-11-07

## 2019-11-07 RX ORDER — ACETAMINOPHEN 500 MG
650 TABLET ORAL EVERY 6 HOURS
Refills: 0 | Status: DISCONTINUED | OUTPATIENT
Start: 2019-11-07 | End: 2019-11-07

## 2019-11-07 RX ORDER — OXYCODONE HYDROCHLORIDE 5 MG/1
5 TABLET ORAL EVERY 6 HOURS
Refills: 0 | Status: DISCONTINUED | OUTPATIENT
Start: 2019-11-07 | End: 2019-11-07

## 2019-11-07 RX ORDER — DABIGATRAN ETEXILATE MESYLATE 150 MG/1
150 CAPSULE ORAL
Refills: 0 | Status: DISCONTINUED | OUTPATIENT
Start: 2019-11-07 | End: 2019-11-07

## 2019-11-07 RX ORDER — ACETAMINOPHEN 500 MG
2 TABLET ORAL
Qty: 0 | Refills: 0 | DISCHARGE
Start: 2019-11-07

## 2019-11-07 RX ORDER — OXYCODONE HYDROCHLORIDE 5 MG/1
10 TABLET ORAL EVERY 6 HOURS
Refills: 0 | Status: DISCONTINUED | OUTPATIENT
Start: 2019-11-07 | End: 2019-11-07

## 2019-11-07 RX ORDER — MAGNESIUM SULFATE 500 MG/ML
2 VIAL (ML) INJECTION ONCE
Refills: 0 | Status: COMPLETED | OUTPATIENT
Start: 2019-11-07 | End: 2019-11-07

## 2019-11-07 RX ADMIN — HEPARIN SODIUM 2000 UNIT(S)/HR: 5000 INJECTION INTRAVENOUS; SUBCUTANEOUS at 04:15

## 2019-11-07 RX ADMIN — OXYCODONE HYDROCHLORIDE 5 MILLIGRAM(S): 5 TABLET ORAL at 08:38

## 2019-11-07 RX ADMIN — DABIGATRAN ETEXILATE MESYLATE 150 MILLIGRAM(S): 150 CAPSULE ORAL at 09:48

## 2019-11-07 RX ADMIN — OXYCODONE HYDROCHLORIDE 5 MILLIGRAM(S): 5 TABLET ORAL at 09:30

## 2019-11-07 NOTE — DISCHARGE NOTE PROVIDER - CARE PROVIDER_API CALL
Terence Huynh)  Surgery  [address], [address]  [place] [address]  Phone: [phone]  Fax: [phone]  Follow Up Time:

## 2019-11-07 NOTE — DISCHARGE NOTE PROVIDER - HOSPITAL COURSE
59 year old male presenting with chief complaint of RUQ and epigastric pain x6 days. Reports that he had similar episode 2 months ago that lasted an hour that recurred 1 month ago and lasted 2-3hrs. Describes pain as dull, constant, non radiating pain rated 4/10 that started Saturday PTA prompting him to report to his PCP Tuesday who ordered labs and sent him for a RUQ U/S that he was supposed to f/u results on this coming Monday but his pain acutely worsened which resulted in him coming to [facility] ED. When evaluated by surgery team, patient had received 8mg morphine and was not reporting any pain. ACS/Trauma consulted given U/S findings of gallstones, dilated CBD and lipase >3000. ED contacted on call GI (Dr. Denis) who recommended MRCP in the AM. ROS at that time negative for fever, chills, nausea, vomiting, chest pain, SOB, dyspnea, dysuria or changes in bowel habits. Pt. made NPO, pradaxa stopped, Heparin started.  MRCP showed stone/sludge within the CBD.  Pt underwent ERCP on 11/5 with successful removal of stone and sludge.  On 11/7 Patient underwent Laparoscopic Cholecystectomy.  The patient tolerated the procedure well and was transferred to the floor in stable condition.  The patient's diet was advanced PO pain medication ordered.  Once patient was ambulating well and voiding without difficulty, patient was found to be stable for discharge to home.  Pain was well-controlled at the time of discharge and the patient was tolerating a full diet.

## 2019-11-07 NOTE — PROGRESS NOTE ADULT - SUBJECTIVE AND OBJECTIVE BOX
INTERVAL HPI/OVERNIGHT EVENTS:  No acute events overnight. Patient evaluated at bedside and found hemodynamically stable and in no acute distress. Patient tolerating diet, OOB and ambulating as tolerated, complains of soreness around surgical wound appropriate after surgical procedure. Patient on heparin nomogram when PPT therapeutic will change to pradaxa home meds for lupus anticoagulant. Denies fevers chills nausea vomiting or generalized malaise    SUBJECTIVE:      MEDICATIONS  (STANDING):  heparin  Infusion.  Unit(s)/Hr (18 mL/Hr) IV Continuous <Continuous>  lactated ringers. 1000 milliLiter(s) (125 mL/Hr) IV Continuous <Continuous>    MEDICATIONS  (PRN):  morphine  - Injectable 2 milliGRAM(s) IV Push every 4 hours PRN Severe Pain (7 - 10)  morphine  - Injectable 0.5 milliGRAM(s) IV Push every 4 hours PRN Mild Pain (1 - 3)  ondansetron Injectable 4 milliGRAM(s) IV Push every 4 hours PRN Nausea and/or Vomiting  oxycodone    5 mG/acetaminophen 325 mG 1 Tablet(s) Oral every 6 hours PRN Mild Pain (1 - 3)  oxycodone    5 mG/acetaminophen 325 mG 2 Tablet(s) Oral every 6 hours PRN Moderate Pain (4 - 6)      Vital Signs Last 24 Hrs  T(C): 36.7 (06 Nov 2019 23:31), Max: 37.3 (06 Nov 2019 15:17)  T(F): 98.1 (06 Nov 2019 23:31), Max: 99.2 (06 Nov 2019 15:17)  HR: 63 (06 Nov 2019 23:31) (58 - 78)  BP: 121/79 (06 Nov 2019 23:31) (121/79 - 158/83)  BP(mean): --  RR: 19 (06 Nov 2019 23:31) (13 - 20)  SpO2: 94% (06 Nov 2019 23:31) (94% - 99%)    PHYSICAL EXAM:      General: no acute distress   Chest: non-labored breathing  Abdomen: no distended soft and depressible, minimal tenderness to palpation in RUQ no rebound or guarding. Surgical wounds c/d/i      I&O's Detail    05 Nov 2019 07:01  -  06 Nov 2019 07:00  --------------------------------------------------------  IN:    heparin  Infusion.: 306 mL    lactated ringers.: 2375 mL    Solution: 125 mL  Total IN: 2806 mL    OUT:    Voided: 800 mL  Total OUT: 800 mL    Total NET: 2006 mL          LABS:                        12.0   9.30  )-----------( 189      ( 07 Nov 2019 02:51 )             35.7     11-07    139  |  102  |  9.0  ----------------------------<  117<H>  4.0   |  26.0  |  0.68    Ca    8.4<L>      07 Nov 2019 02:51  Phos  3.9     11-07  Mg     1.8     11-07    TPro  5.8<L>  /  Alb  3.1<L>  /  TBili  1.4  /  DBili  x   /  AST  20  /  ALT  76<H>  /  AlkPhos  199<H>  11-06    PT/INR - ( 06 Nov 2019 04:54 )   PT: 13.3 sec;   INR: 1.15 ratio         PTT - ( 07 Nov 2019 02:51 )  PTT:44.7 sec

## 2019-11-07 NOTE — PROGRESS NOTE ADULT - ASSESSMENT
59 year old with diagnosis gallstone pancreatitis confirmed by MRCP s/p ERCP 11/5 and s/p lap cholecystectomy 11/6, currently afebrile and hemodynamically stable.     -When PTT therapeutic, switch to Pradaxa home medication  -Possible d/c today 11/7.

## 2019-11-07 NOTE — DISCHARGE NOTE NURSING/CASE MANAGEMENT/SOCIAL WORK - NSDCPEPRADAXA_GEN_ALL_CORE
Dabigatran/Pradaxa - Follow up monitoring/Dabigatran/Pradaxa - Potential for adverse drug reactions and interactions/Dabigatran/Pradaxa - Compliance/Dabigatran/Pradaxa - Dietary Advice

## 2019-11-07 NOTE — DIETITIAN INITIAL EVALUATION ADULT. - NUTRITIONGOAL OUTCOME1
Pt will tolerate low fiber diet meet >75% estimated needs Pt will tolerate low fiber diet meet >75% estimated needs to promote healing.

## 2019-11-07 NOTE — DIETITIAN INITIAL EVALUATION ADULT. - PROBLEM SELECTOR PLAN 1
1. Admit to ACS/Trauma under Dr. Burkett   2. Stat CT A/P with IV contrast ordered   3. NPO, IVFs and IV pain control and anti-emetics available   4. Confirm home dosages with [facility] in [facility]    5. GI following and MRCP requested and ordered to be done in the AM

## 2019-11-07 NOTE — DISCHARGE NOTE PROVIDER - NSDCMRMEDTOKEN_GEN_ALL_CORE_FT
acetaminophen 325 mg oral tablet: 2 tab(s) orally every 6 hours, As needed, Mild Pain (1 - 3)  Aspir 81:   Bystolic:   enalapril:   oxyCODONE 5 mg oral tablet: 1 or 2  tab(s) orally every 6 hours, As needed, for Pain  MDD:8 tabs  Pradaxa:

## 2019-11-07 NOTE — DISCHARGE NOTE NURSING/CASE MANAGEMENT/SOCIAL WORK - NSDCPEPRADAXAREACT_GEN_ALL_CORE
Dabigatran/Pradaxa increases your risk for bleeding. Notify your doctor if you experience any of the following side effects: unusual bleeding or bruising, vomiting blood or coffee ground-like material, red or black stool, itching or hives, chest tightness, trouble breathing, swelling in your face or hands, swelling in your mouth or throat, back pain, numbness or weakness in your legs or feet, red, brown, or pink urine, lightheadedness, dizziness, headache, heartburn, nausea or indigestion. When Dabigatran/Pradaxa is taken with other medicines, they can affect how it works. Taking other medications such as aspirin, blood thinners, and nonsteroidal anti-inflammatories increases your risk of bleeding. It is very important to tell your health care provider about all other medicines, including over-the-counter medications, herbs, and vitamins you are taking.  DO NOT start, stop, or change the dosage of any medicine, including over-the-counter medicines, vitamins, and herbal products without your doctor’s approval. Any products containing aspirin or are nonsteroidal anti-inflammatories lessen the blood’s ability to form clots and adds to the effect of Dabigatran/Pradaxa. Never take aspirin or medicines that contain aspirin without speaking to your doctor.

## 2019-11-07 NOTE — DISCHARGE NOTE PROVIDER - NSDCCPCAREPLAN_GEN_ALL_CORE_FT
PRINCIPAL DISCHARGE DIAGNOSIS  Diagnosis: Gallstone pancreatitis  Assessment and Plan of Treatment: FOLLOW UP: Dr. aguilera 1-2 weeks.  Call for an appointment. Please follow up with your primary care physician regarding your hospitalization   DIET: resume regular diet as tolertated  ACTIVITY: no heavy lifting > 10 lbs until cleared by surgeon, leave steri strips in place, they will fall off on their own or will be taken off at your follow up appointment.  You may shower but do not submerge wounds in water such as bath or swimming.  MEDICATION: you are encouraged to take over the counter tylenol, motrin or equivalent as directed for pain.  Should this not be adequate a narcotic pain medication has been prescribed.  Do not drive, operate machinery, make important decisions or drink alcohol while taking.  Resume all home meds as prior to admission  RETURN TO THE EMERGENCY DEPARTMENT: return to the ED for any diet intolerance, persistent pain not relieved with pain medications, nausea, vomiting diarrhea , fevers, chills or any other concerns you may have

## 2019-11-07 NOTE — DISCHARGE NOTE NURSING/CASE MANAGEMENT/SOCIAL WORK - NSDCPEPRADAXADIET_GEN_ALL_CORE
Eat healthy foods you enjoy. Dabigatran/Pradaxa DOES NOT have a special diet. Drinking alcohol while on Dabigatran/Pradaxa increases your risk of stomach bleeding. Talk to your doctor about how much alcohol is safe to drink.

## 2019-11-07 NOTE — PROVIDER CONTACT NOTE (MEDICATION) - SITUATION
Magnesium 2 gram IV was started. Patient c/o of feeling funny and fainting. Patient requested to stop the medication.

## 2019-11-07 NOTE — PROGRESS NOTE ADULT - REASON FOR ADMISSION
Gallstone Pancreatitis Evaluation

## 2019-11-07 NOTE — DISCHARGE NOTE NURSING/CASE MANAGEMENT/SOCIAL WORK - NSDCPEPRADAXACOMP_GEN_ALL_CORE
Dabigatran/Pradaxa reduces the risk of stroke, and is used to treat and prevent blood clots. Swallow the capsule whole with a full glass of water. Do not crush, break, chew, or open it. Never skip a dose of Dabigatran/Pradaxa. If you forget to take your Dabigatran/Pradaxa, take a dose as soon as you remember. If your next dose is less than 6 hours away, skip the missed dose and go back to your regular dosing schedule. DO NOT take an extra pill to ‘catch up’. NEVER TAKE A DOUBLE DOSE.  Notify your doctor that you missed a dose. Take Dabigatran/Pradaxa at the same time each morning and/or evening. Dabigatran/Pradaxa may be taken with other medication or food.

## 2019-11-07 NOTE — DIETITIAN INITIAL EVALUATION ADULT. - OTHER INFO
59 year old hx HTN, A-fib presenting with chief complaint of RUQ and epigastric pain x6 days. Current diagnosis gallstone pancreatitis confirmed by MRCP s/p ERCP 11/5 and s/p lap cholecystectomy 11/6, currently afebrile and hemodynamically stable. Spoke with pt at bedside states  lbs, denies recent wt loss/gain. Reports usual good po intake/appetite, pt does not follow any particular diet. Reports tolerating diet well, consumed 50% of dinner last night. Denies n/v/c/d. Provided pt verbal and written materials on low fiber diet, and emphasized focusing on protein sources to promote healing, answered all pts questions. Pt appeared receptive to diet ed. 59 year old hx HTN, A-fib presenting with chief complaint of RUQ and epigastric pain x6 days. Current diagnosis gallstone pancreatitis confirmed by MRCP s/p ERCP 11/5 and s/p lap cholecystectomy 11/6, currently afebrile and hemodynamically stable. Spoke with pt at bedside states  lbs, denies recent wt loss/gain. Reports usual good po intake/appetite, pt does not follow any particular diet. Reports tolerating diet well, consumed 50% of dinner last night. Denies n/v/c/d. Provided pt verbal and written materials on low fiber diet, and emphasized focusing on HBV protein sources to promote healing, answered all pts questions. Pt appeared receptive to diet ed.

## 2019-11-07 NOTE — DISCHARGE NOTE NURSING/CASE MANAGEMENT/SOCIAL WORK - PATIENT PORTAL LINK FT
You can access the FollowMyHealth Patient Portal offered by [facility] by registering at the following website: [email or web address]. By joining "[facility]"’s FollowMyHealth portal, you will also be able to view your health information using other applications (apps) compatible with our system.

## 2019-11-08 LAB
CULTURE RESULTS: SIGNIFICANT CHANGE UP
CULTURE RESULTS: SIGNIFICANT CHANGE UP
SPECIMEN SOURCE: SIGNIFICANT CHANGE UP
SPECIMEN SOURCE: SIGNIFICANT CHANGE UP
SURGICAL PATHOLOGY STUDY: SIGNIFICANT CHANGE UP

## 2020-03-26 NOTE — ED ADULT TRIAGE NOTE - SOURCE OF INFORMATION
Patient : Phil Garsia Age: 19 year old Sex: male   MRN: [id number] Encounter Date: 3/26/2020      History     Chief Complaint   Patient presents with   • Infection     4 days of fever, chest pain, sore throat, dry cough, diziness   • Cough   • Sore Throat     difficulty swallowing   • Dizziness   • Abdominal Pain     Diarrhea x4d     HPI  18 yo prev healthy M who presents with 4 days of fever, sore throat, abd pain diarrhea. Just last night he developed chest pain and a dry cough as well. Tmax 103. Chest pain is substernal, constant, \"stabbing.\" Abd pain is upper, nothing seems to make it better or worse. He is having nonbloody, nonmucousy loose stools. He has been eating and drinking less than usual due to difficulty swallowing. No vomiting, difficulty breathing. No sick contacts or recent travel. Has been taking advil for fevers.     States that two weeks ago he had some SOB and was given steroids, \"breathing treatments,\" and the Zpack. His symptoms resolved, and then he developed these new symptoms a few days later.     PCP: Dr. Cárdenas  Immun: UTD    No Known Allergies    Current Discharge Medication List      Prior to Admission Medications    Details   AMITRIPTYLINE HCL PO              Current Discharge Medication List          Past Medical History:   Diagnosis Date   • IBS (irritable bowel syndrome)        No past surgical history on file.    No family history on file.    Social History     Tobacco Use   • Smoking status: Not on file   Substance Use Topics   • Alcohol use: Not on file   • Drug use: Not on file       Review of Systems   Constitutional: Positive for appetite change, chills and fever.   HENT: Positive for sore throat.    Respiratory: Positive for cough. Negative for shortness of breath.         Chest pain   Gastrointestinal: Positive for abdominal pain and diarrhea. Negative for vomiting.       Physical Exam     ED Triage Vitals   ED Triage Vitals Group      Temp 03/26/20 1044 98.4 °F (36.9 °C)       Heart Rate 03/26/20 1044 101      Resp 03/26/20 1044 (!) 28      BP 03/26/20 1044 (!) 157/91      SpO2 03/26/20 1040 95 %      EtCO2 mmHg --       Height --       Weight 03/26/20 1039 173 lb 4.5 oz (78.6 kg)      Weight Scale Used 03/26/20 1039 ED Actual      BMI (Calculated) --       IBW/kg (Calculated) --        Physical Exam  Vitals signs reviewed.   Constitutional:       General: He is not in acute distress.     Appearance: He is well-developed. He is not ill-appearing, toxic-appearing or diaphoretic.   HENT:      Head: Normocephalic and atraumatic.      Right Ear: Tympanic membrane normal.      Left Ear: Tympanic membrane normal.      Mouth/Throat:      Mouth: Oral lesions present.      Pharynx: Posterior oropharyngeal erythema present. No oropharyngeal exudate.      Comments: Small ulcer near uvula  Eyes:      Conjunctiva/sclera: Conjunctivae normal.      Pupils: Pupils are equal, round, and reactive to light.   Neck:      Musculoskeletal: Neck supple.   Cardiovascular:      Rate and Rhythm: Normal rate and regular rhythm.      Heart sounds: Normal heart sounds. No murmur.   Pulmonary:      Effort: Pulmonary effort is normal.      Breath sounds: Normal breath sounds. No wheezing or rales.      Comments: RR 28, SpO2 95% on RA  Abdominal:      General: Bowel sounds are normal. There is no distension.      Palpations: Abdomen is soft.      Tenderness: There is no abdominal tenderness.   Lymphadenopathy:      Cervical: No cervical adenopathy.   Skin:     General: Skin is warm and dry.      Findings: No rash.   Neurological:      General: No focal deficit present.      Mental Status: He is alert.      Sensory: No sensory deficit.      Motor: No abnormal muscle tone.      Gait: Gait normal.      Deep Tendon Reflexes: Reflexes normal.   Psychiatric:         Behavior: Behavior normal.           Lab Results     Results for orders placed or performed during the hospital encounter of 03/26/20   Streptococcus group A  PCR   Result Value Ref Range    Group A Strep Throat PCR NOT DETECTED NOT DETECTED         Radiology Results     Imaging Results          XR CHEST PA AND LATERAL 2 VIEWS (Final result)  Result time 03/26/20 11:09:40    Final result                 Impression:    No abnormality.    Electronically Signed by: PHIL MACHUCA M.D.   Signed on: 3/26/2020 11:09 AM                Narrative:    EXAM:XR CHEST PA AND LATERAL 2 VIEWS.    CLINICAL INDICATION: Fevers and cough.    COMPARISON: None.    FINDINGS: Heart size is normal.  Superior mediastinum is not widened.  Lungs are clear.                                ED Medication Orders (From admission, onward)    Ordered Start     Status Ordering Provider    03/26/20 1204 03/26/20 1215  ondansetron (ZOFRAN ODT) disintegrating tablet 8 mg  ONCE      Ordered ROBE ALATORRE    03/26/20 1156 03/26/20 1200  acetaminophen (TYLENOL) tablet 650 mg  ONCE      Last MAR action:  Given ROBE ALATORRE            Clinical Impression     ED Diagnosis   1. Viral pharyngitis          18 yo prev healthy M with multiple symptoms including fevers, sore throat, abd pain, diarrhea, chest pain, cough. DDx includes pneumonia vs myocarditis/pericarditis vs strep throat vs viral pharyngitis vs covid. Exam notable for oral ulcer, mild tachypnea but no focal lung findings. Afebrile and overall well appearing on exam.     -CXR 2 views  -Rapid strep    Reassessment:  1144: Rapid strep negative. CXR unremarkable. Will discharge home.  1200: Gave tylenol. Nauseous afterwards. Will give zofran 4mg.    Disposition        Discharge 3/26/2020 11:44 AM  Phil Garsia discharge to home/self care.        Discharge home. Take tylenol and motrin alternating every 3 hours. Stay hydrated. Quarantine for 14 days since we cannot rule out covid. Return if fevers are persisting beyond the next 24-48 hours or for any new or worsening symptoms. Prescription for zofran provided.             Noreen Dykes,  MD  Resident  03/26/20 1146       Noreen Dykes MD  Resident  03/26/20 [id number]     Patient

## 2023-03-10 ENCOUNTER — INPATIENT (INPATIENT)
Facility: HOSPITAL | Age: 63
LOS: 4 days | Discharge: HOME | DRG: 435 | End: 2023-03-15
Attending: INTERNAL MEDICINE | Admitting: HOSPITALIST
Payer: COMMERCIAL

## 2023-03-10 VITALS
HEART RATE: 72 BPM | TEMPERATURE: 99 F | SYSTOLIC BLOOD PRESSURE: 182 MMHG | WEIGHT: 220.02 LBS | DIASTOLIC BLOOD PRESSURE: 101 MMHG | RESPIRATION RATE: 18 BRPM | OXYGEN SATURATION: 98 %

## 2023-03-10 LAB
ALBUMIN SERPL ELPH-MCNC: 4 G/DL — SIGNIFICANT CHANGE UP (ref 3.3–5.2)
ALP SERPL-CCNC: 775 U/L — HIGH (ref 40–120)
ALT FLD-CCNC: 625 U/L — HIGH
ANION GAP SERPL CALC-SCNC: 9 MMOL/L — SIGNIFICANT CHANGE UP (ref 5–17)
APTT BLD: 35 SEC — SIGNIFICANT CHANGE UP (ref 27.5–35.5)
AST SERPL-CCNC: 407 U/L — HIGH
BASOPHILS # BLD AUTO: 0.07 K/UL — SIGNIFICANT CHANGE UP (ref 0–0.2)
BASOPHILS NFR BLD AUTO: 0.9 % — SIGNIFICANT CHANGE UP (ref 0–2)
BILIRUB SERPL-MCNC: 12.5 MG/DL — HIGH (ref 0.4–2)
BLD GP AB SCN SERPL QL: SIGNIFICANT CHANGE UP
BUN SERPL-MCNC: 12.6 MG/DL — SIGNIFICANT CHANGE UP (ref 8–20)
CALCIUM SERPL-MCNC: 9.3 MG/DL — SIGNIFICANT CHANGE UP (ref 8.4–10.5)
CHLORIDE SERPL-SCNC: 99 MMOL/L — SIGNIFICANT CHANGE UP (ref 96–108)
CO2 SERPL-SCNC: 34 MMOL/L — HIGH (ref 22–29)
CREAT SERPL-MCNC: 0.43 MG/DL — LOW (ref 0.5–1.3)
EGFR: 122 ML/MIN/1.73M2 — SIGNIFICANT CHANGE UP
EOSINOPHIL # BLD AUTO: 0.23 K/UL — SIGNIFICANT CHANGE UP (ref 0–0.5)
EOSINOPHIL NFR BLD AUTO: 3 % — SIGNIFICANT CHANGE UP (ref 0–6)
FLUAV AG NPH QL: SIGNIFICANT CHANGE UP
FLUBV AG NPH QL: SIGNIFICANT CHANGE UP
GLUCOSE SERPL-MCNC: 129 MG/DL — HIGH (ref 70–99)
HCT VFR BLD CALC: 47.4 % — SIGNIFICANT CHANGE UP (ref 39–50)
HGB BLD-MCNC: 16.3 G/DL — SIGNIFICANT CHANGE UP (ref 13–17)
IMM GRANULOCYTES NFR BLD AUTO: 0.4 % — SIGNIFICANT CHANGE UP (ref 0–0.9)
INR BLD: 1.1 RATIO — SIGNIFICANT CHANGE UP (ref 0.88–1.16)
LIDOCAIN IGE QN: 50 U/L — SIGNIFICANT CHANGE UP (ref 22–51)
LYMPHOCYTES # BLD AUTO: 1.93 K/UL — SIGNIFICANT CHANGE UP (ref 1–3.3)
LYMPHOCYTES # BLD AUTO: 25 % — SIGNIFICANT CHANGE UP (ref 13–44)
MCHC RBC-ENTMCNC: 29.8 PG — SIGNIFICANT CHANGE UP (ref 27–34)
MCHC RBC-ENTMCNC: 34.4 GM/DL — SIGNIFICANT CHANGE UP (ref 32–36)
MCV RBC AUTO: 86.7 FL — SIGNIFICANT CHANGE UP (ref 80–100)
MONOCYTES # BLD AUTO: 0.74 K/UL — SIGNIFICANT CHANGE UP (ref 0–0.9)
MONOCYTES NFR BLD AUTO: 9.6 % — SIGNIFICANT CHANGE UP (ref 2–14)
NEUTROPHILS # BLD AUTO: 4.71 K/UL — SIGNIFICANT CHANGE UP (ref 1.8–7.4)
NEUTROPHILS NFR BLD AUTO: 61.1 % — SIGNIFICANT CHANGE UP (ref 43–77)
PLATELET # BLD AUTO: 202 K/UL — SIGNIFICANT CHANGE UP (ref 150–400)
POTASSIUM SERPL-MCNC: 3.3 MMOL/L — LOW (ref 3.5–5.3)
POTASSIUM SERPL-SCNC: 3.3 MMOL/L — LOW (ref 3.5–5.3)
PROT SERPL-MCNC: 6.5 G/DL — LOW (ref 6.6–8.7)
PROTHROM AB SERPL-ACNC: 12.8 SEC — SIGNIFICANT CHANGE UP (ref 10.5–13.4)
RBC # BLD: 5.47 M/UL — SIGNIFICANT CHANGE UP (ref 4.2–5.8)
RBC # FLD: 14.4 % — SIGNIFICANT CHANGE UP (ref 10.3–14.5)
RSV RNA NPH QL NAA+NON-PROBE: SIGNIFICANT CHANGE UP
SARS-COV-2 RNA SPEC QL NAA+PROBE: SIGNIFICANT CHANGE UP
SODIUM SERPL-SCNC: 142 MMOL/L — SIGNIFICANT CHANGE UP (ref 135–145)
WBC # BLD: 7.71 K/UL — SIGNIFICANT CHANGE UP (ref 3.8–10.5)
WBC # FLD AUTO: 7.71 K/UL — SIGNIFICANT CHANGE UP (ref 3.8–10.5)

## 2023-03-10 PROCEDURE — G1004: CPT

## 2023-03-10 PROCEDURE — 74177 CT ABD & PELVIS W/CONTRAST: CPT | Mod: 26,MG

## 2023-03-10 PROCEDURE — 99285 EMERGENCY DEPT VISIT HI MDM: CPT

## 2023-03-10 NOTE — ED ADULT NURSE NOTE - OBJECTIVE STATEMENT
Pt c/o crampy left sided abdominal pain for 2 weeks with dark urine and yellowish discoloration of eyes and sclera; also reported nausea. hx htn.

## 2023-03-10 NOTE — ED STATDOCS - PROGRESS NOTE DETAILS
Kt Edwards for ED attending, Dr. Braga    61 y/o male on Losartan, K+, HCTZ, Pradaxa, Amlodipine, Bystolic presents for evaluation for jaundice. Hx of gallstone pancreatitis however had cholecystectomy in past. Has otherwise been feeling well. Pt will be placed in the main ED for complete evaluation by another provider.

## 2023-03-10 NOTE — ED PROVIDER NOTE - CLINICAL SUMMARY MEDICAL DECISION MAKING FREE TEXT BOX
Discussed with patient and patient's wife at bedside CT findings highly suspicious for pancreatic cancer.  MRCP ordered tumor marker ordered pain is currently controlled we will admit to the hospital for further differentiation of new onset pancreatic mass treatment of pancreatitis patient pavement patient's wife agree to plan of care

## 2023-03-10 NOTE — ED PROVIDER NOTE - OBJECTIVE STATEMENT
Patient presents to ED describing painless jaundice.  He describes over 2-week period increased yellowing of the skin associated with minor abdominal cramping.  No vomiting no diarrhea.  No recent high risk travel.  No recent weight loss.  No recent fever.  No chest pain or shortness of breath.  No other skin lesions.  No new medications.  He saw a gastroenterologist on Wednesday who prescribed an antispasmodic and ordered an outpatient CAT scan.

## 2023-03-11 DIAGNOSIS — K83.1 OBSTRUCTION OF BILE DUCT: ICD-10-CM

## 2023-03-11 DIAGNOSIS — K86.89 OTHER SPECIFIED DISEASES OF PANCREAS: ICD-10-CM

## 2023-03-11 PROCEDURE — 70450 CT HEAD/BRAIN W/O DYE: CPT | Mod: 26

## 2023-03-11 PROCEDURE — 99223 1ST HOSP IP/OBS HIGH 75: CPT

## 2023-03-11 PROCEDURE — 71250 CT THORAX DX C-: CPT | Mod: 26

## 2023-03-11 PROCEDURE — 74183 MRI ABD W/O CNTR FLWD CNTR: CPT | Mod: 26

## 2023-03-11 PROCEDURE — 12345: CPT | Mod: NC

## 2023-03-11 RX ORDER — LOSARTAN POTASSIUM 100 MG/1
1 TABLET, FILM COATED ORAL
Qty: 0 | Refills: 0 | DISCHARGE

## 2023-03-11 RX ORDER — ASPIRIN/CALCIUM CARB/MAGNESIUM 324 MG
0 TABLET ORAL
Qty: 0 | Refills: 0 | DISCHARGE

## 2023-03-11 RX ORDER — DABIGATRAN ETEXILATE MESYLATE 150 MG/1
0 CAPSULE ORAL
Qty: 0 | Refills: 0 | DISCHARGE

## 2023-03-11 RX ORDER — ONDANSETRON 8 MG/1
4 TABLET, FILM COATED ORAL EVERY 8 HOURS
Refills: 0 | Status: DISCONTINUED | OUTPATIENT
Start: 2023-03-11 | End: 2023-03-15

## 2023-03-11 RX ORDER — AMLODIPINE BESYLATE 2.5 MG/1
2.5 TABLET ORAL DAILY
Refills: 0 | Status: DISCONTINUED | OUTPATIENT
Start: 2023-03-11 | End: 2023-03-15

## 2023-03-11 RX ORDER — NEBIVOLOL HYDROCHLORIDE 5 MG/1
1 TABLET ORAL
Qty: 0 | Refills: 0 | DISCHARGE

## 2023-03-11 RX ORDER — HYDRALAZINE HCL 50 MG
5 TABLET ORAL EVERY 6 HOURS
Refills: 0 | Status: DISCONTINUED | OUTPATIENT
Start: 2023-03-11 | End: 2023-03-15

## 2023-03-11 RX ORDER — ACETAMINOPHEN 500 MG
650 TABLET ORAL EVERY 6 HOURS
Refills: 0 | Status: DISCONTINUED | OUTPATIENT
Start: 2023-03-11 | End: 2023-03-15

## 2023-03-11 RX ORDER — LOSARTAN POTASSIUM 100 MG/1
100 TABLET, FILM COATED ORAL DAILY
Refills: 0 | Status: DISCONTINUED | OUTPATIENT
Start: 2023-03-11 | End: 2023-03-15

## 2023-03-11 RX ORDER — LANOLIN ALCOHOL/MO/W.PET/CERES
5 CREAM (GRAM) TOPICAL AT BEDTIME
Refills: 0 | Status: DISCONTINUED | OUTPATIENT
Start: 2023-03-11 | End: 2023-03-15

## 2023-03-11 RX ORDER — POTASSIUM CHLORIDE 20 MEQ
40 PACKET (EA) ORAL
Refills: 0 | Status: COMPLETED | OUTPATIENT
Start: 2023-03-11 | End: 2023-03-12

## 2023-03-11 RX ORDER — HYDROCHLOROTHIAZIDE 25 MG
1 TABLET ORAL
Qty: 0 | Refills: 0 | DISCHARGE

## 2023-03-11 RX ORDER — NEBIVOLOL HYDROCHLORIDE 5 MG/1
10 TABLET ORAL DAILY
Refills: 0 | Status: DISCONTINUED | OUTPATIENT
Start: 2023-03-11 | End: 2023-03-15

## 2023-03-11 RX ORDER — SODIUM CHLORIDE 9 MG/ML
3 INJECTION INTRAMUSCULAR; INTRAVENOUS; SUBCUTANEOUS EVERY 8 HOURS
Refills: 0 | Status: DISCONTINUED | OUTPATIENT
Start: 2023-03-11 | End: 2023-03-12

## 2023-03-11 RX ORDER — AMLODIPINE BESYLATE 2.5 MG/1
1 TABLET ORAL
Qty: 0 | Refills: 0 | DISCHARGE

## 2023-03-11 RX ORDER — DABIGATRAN ETEXILATE MESYLATE 150 MG/1
1 CAPSULE ORAL
Qty: 0 | Refills: 0 | DISCHARGE

## 2023-03-11 RX ORDER — NEBIVOLOL HYDROCHLORIDE 5 MG/1
0 TABLET ORAL
Qty: 0 | Refills: 0 | DISCHARGE

## 2023-03-11 RX ORDER — ALPRAZOLAM 0.25 MG
0.5 TABLET ORAL ONCE
Refills: 0 | Status: DISCONTINUED | OUTPATIENT
Start: 2023-03-11 | End: 2023-03-11

## 2023-03-11 RX ADMIN — Medication 1 MILLIGRAM(S): at 00:50

## 2023-03-11 RX ADMIN — LOSARTAN POTASSIUM 100 MILLIGRAM(S): 100 TABLET, FILM COATED ORAL at 02:04

## 2023-03-11 RX ADMIN — NEBIVOLOL HYDROCHLORIDE 10 MILLIGRAM(S): 5 TABLET ORAL at 06:32

## 2023-03-11 RX ADMIN — SODIUM CHLORIDE 3 MILLILITER(S): 9 INJECTION INTRAMUSCULAR; INTRAVENOUS; SUBCUTANEOUS at 21:52

## 2023-03-11 RX ADMIN — AMLODIPINE BESYLATE 2.5 MILLIGRAM(S): 2.5 TABLET ORAL at 02:08

## 2023-03-11 RX ADMIN — SODIUM CHLORIDE 3 MILLILITER(S): 9 INJECTION INTRAMUSCULAR; INTRAVENOUS; SUBCUTANEOUS at 11:42

## 2023-03-11 RX ADMIN — SODIUM CHLORIDE 3 MILLILITER(S): 9 INJECTION INTRAMUSCULAR; INTRAVENOUS; SUBCUTANEOUS at 06:42

## 2023-03-11 RX ADMIN — Medication 40 MILLIEQUIVALENT(S): at 06:22

## 2023-03-11 RX ADMIN — Medication 5 MILLIGRAM(S): at 21:43

## 2023-03-11 RX ADMIN — Medication 5 MILLIGRAM(S): at 13:12

## 2023-03-11 RX ADMIN — Medication 40 MILLIEQUIVALENT(S): at 17:54

## 2023-03-11 RX ADMIN — Medication 0.5 MILLIGRAM(S): at 12:18

## 2023-03-11 NOTE — CONSULT NOTE ADULT - SUBJECTIVE AND OBJECTIVE BOX
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 Patient reports Seeing a GI doctor a few days ago for the pain who ordered OPT CT-scan, however due to jaundice decided to come to ED. Patient denies any GI hx, no travel, sick contacts, or exposure to occupational hazards. No fever, weight loss, night sweats. No changes in medications. In the ED patient with elevated BP, no fever. Labs with TB of 12.5, with AP of 775, , and ALT of 625. CT of abd/pelvis showed Intrahepatic ductal dilatation, CBD dilatation with abrupt tapering and pancreatic ductal dilatation with focal cut off, highly suspicious for obstructing pancreatic mass. Peripancreatic inflammatory changes and heterogeneous hypoattenuation suspicious for superimposed pancreatitis. GI consulted for further evaluation. Patient denies any prior EGD/Colonoscopy in the past. At this time, Denies nausea, vomiting, abdominal pain, fever, chills, chest pain, shortness of breath, hematemesis, hematochezia, melena.         PAST MEDICAL & SURGICAL HISTORY:  A-fib      Hypertension      Prothrombin deficiency      Cholecystectomy in 2019           Allergies    latex (Rash)  No Known Drug Allergies    Intolerances        MEDICATIONS  (STANDING):  amLODIPine   Tablet 2.5 milliGRAM(s) Oral daily  losartan 100 milliGRAM(s) Oral daily  melatonin 5 milliGRAM(s) Oral at bedtime  nebivolol 10 milliGRAM(s) Oral daily  potassium chloride   Powder 40 milliEquivalent(s) Oral two times a day  sodium chloride 0.9% lock flush 3 milliLiter(s) IV Push every 8 hours    MEDICATIONS  (PRN):  acetaminophen     Tablet .. 650 milliGRAM(s) Oral every 6 hours PRN Temp greater or equal to 38C (100.4F), Mild Pain (1 - 3)  aluminum hydroxide/magnesium hydroxide/simethicone Suspension 30 milliLiter(s) Oral every 4 hours PRN Dyspepsia  ondansetron Injectable 4 milliGRAM(s) IV Push every 8 hours PRN Nausea and/or Vomiting      Social History:    Marital Status:  (   )    (   ) Single    (   )    (  )   Occupation:   Lives with: (  ) alone  (  ) children   (  ) spouse   (  ) parents  (  ) other    Substance Use (street drugs): (  ) never used  (  ) other:  Tobacco Usage:  (   ) never smoked   (   ) former smoker   (   ) current smoker  (     ) pack year  (        ) last cigarette date  Alcohol Usage:  Sexual History:     Family History   IBD (  ) Yes   (  ) No  GI Malignancy (  )  Yes    (  ) No    Health Management     Last Colonoscopy - Denies      (     ) Advanced Directives: (     ) None    (      ) DNR    (     ) DNI    (     ) Health Care Proxy:       REVIEW OF SYSTEMS:   General: Negative  HEENT: Negative  CV: Negative  Respiratory: Negative  GI: See HPI  : Negative  MSK: Negative  Hematologic: Negative  Skin: Negative      Vital Signs Last 24 Hrs  T(C): 36.8 (11 Mar 2023 07:33), Max: 37.1 (10 Mar 2023 17:05)  T(F): 98.3 (11 Mar 2023 07:33), Max: 98.7 (10 Mar 2023 17:05)  HR: 63 (11 Mar 2023 07:33) (56 - 98)  BP: 166/100 (11 Mar 2023 07:33) (159/68 - 182/101)  BP(mean): 98 (11 Mar 2023 04:51) (98 - 98)  RR: 17 (11 Mar 2023 07:33) (17 - 19)  SpO2: 96% (11 Mar 2023 07:33) (95% - 98%)    Parameters below as of 11 Mar 2023 07:33  Patient On (Oxygen Delivery Method): room air        PHYSICAL EXAM:   GENERAL:  No acute distress  HEENT:  NC/AT, conjunctiva clear, sclera icteric  CHEST:  No increased effort, breath sounds clear  HEART:  Regular rhythm, S1, S2,   ABDOMEN:  Soft, non-tender, non-distended, normoactive bowel sounds  EXTREMITIES: No edema  SKIN: Jaundice, Warm, dry  NEURO:  Calm, cooperative        LABS:                        16.3   7.71  )-----------( 202      ( 10 Mar 2023 19:43 )             47.4     03-10    142  |  99  |  12.6  ----------------------------<  129<H>  3.3<L>   |  34.0<H>  |  0.43<L>    Ca    9.3      10 Mar 2023 19:43    TPro  6.5<L>  /  Alb  4.0  /  TBili  12.5<H>  /  DBili  x   /  AST  407<H>  /  ALT  625<H>  /  AlkPhos  775<H>  03-10    PT/INR - ( 10 Mar 2023 19:43 )   PT: 12.8 sec;   INR: 1.10 ratio         PTT - ( 10 Mar 2023 19:43 )  PTT:35.0 sec    LIVER FUNCTIONS - ( 10 Mar 2023 19:43 )  Alb: 4.0 g/dL / Pro: 6.5 g/dL / ALK PHOS: 775 U/L / ALT: 625 U/L / AST: 407 U/L / GGT: x             RADIOLOGY & ADDITIONAL TESTS:      < from: CT Abdomen and Pelvis w/ IV Cont (03.10.23 @ 22:19) >    ACC: [id number] EXAM:  CT ABDOMEN AND PELVIS IC   ORDERED BY: TRACIE STAHL     PROCEDURE DATE:  03/10/2023          INTERPRETATION:  CLINICAL INFORMATION: Abdominal pain, jaundice.    COMPARISON: 11/2/2019 CT and MRI    CONTRAST/COMPLICATIONS:  IV Contrast: Omnipaque 350  90 cc administered   10 cc discarded  Oral Contrast: NONE  Complications: None reported at time of study completion    PROCEDURE:  CT of the Abdomen and Pelvis was performed.  Sagittal and coronal reformats were performed.    FINDINGS:  LOWER CHEST: Mild bibasilar atelectasis.    LIVER: Within normal limits.  BILE DUCTS: Mild to moderate intrahepatic ductal dilatation. CBD   dilatation up to 1.8 cm with abrupt tapering in the region of the   pancreatic head.  GALLBLADDER: Cholecystectomy.  SPLEEN: Within normal limits.  PANCREAS: Dilatation of the pancreatic duct up to 1 cm with abrupt cut   off in the body (3, 40). Peripancreatic haziness/inflammation and   heterogeneous hypoattenuation of the neck, body and tail.  ADRENALS: Within normal limits.  KIDNEYS/URETERS: No hydronephrosis. Bilateral renal cysts and   hypodensities too small to characterize.    BLADDER: Mild wall thickening but under distended.  REPRODUCTIVE ORGANS: Enlarged prostate.    BOWEL: No bowel obstruction. Appendix is normal. Colonic diverticulosis.   Duodenal diverticulum.  PERITONEUM: No ascites.  VESSELS: Atherosclerotic changes.  RETROPERITONEUM/LYMPH NODES: No lymphadenopathy.  ABDOMINAL WALL: Small fat-containing left inguinal hernia.  BONES: Degenerative changes.    IMPRESSION:  Intrahepatic ductal dilatation, CBD dilatation with abrupt tapering and   pancreatic ductal dilatation with focal cut off, highly suspicious for   obstructing pancreatic mass. Peripancreatic inflammatory changes and   heterogeneous hypoattenuation suspicious for superimposed pancreatitis.   Pancreatic mass protocol MRI/MRCP is recommended for further evaluation.        --- End of Report ---            SALAZAR DOTSON MD; Attending Radiologist  This document has been electronically signed. M    < end of copied text >

## 2023-03-11 NOTE — PROGRESS NOTE ADULT - SUBJECTIVE AND OBJECTIVE BOX
Patient is a 62y old  Male who presents with a chief complaint of Obstructive Jaundice  r/o pancreatic mass (11 Mar 2023 04:51)      Patient seen and examined at bedside.      ALLERGIES:  latex (Rash)  No Known Drug Allergies    MEDICATIONS  (STANDING):  amLODIPine   Tablet 2.5 milliGRAM(s) Oral daily  losartan 100 milliGRAM(s) Oral daily  melatonin 5 milliGRAM(s) Oral at bedtime  nebivolol 10 milliGRAM(s) Oral daily  potassium chloride   Powder 40 milliEquivalent(s) Oral two times a day  sodium chloride 0.9% lock flush 3 milliLiter(s) IV Push every 8 hours    MEDICATIONS  (PRN):  acetaminophen     Tablet .. 650 milliGRAM(s) Oral every 6 hours PRN Temp greater or equal to 38C (100.4F), Mild Pain (1 - 3)  aluminum hydroxide/magnesium hydroxide/simethicone Suspension 30 milliLiter(s) Oral every 4 hours PRN Dyspepsia  ondansetron Injectable 4 milliGRAM(s) IV Push every 8 hours PRN Nausea and/or Vomiting    Vital Signs Last 24 Hrs  T(F): 98.3 (11 Mar 2023 07:33), Max: 98.7 (10 Mar 2023 17:05)  HR: 63 (11 Mar 2023 07:33) (56 - 98)  BP: 166/100 (11 Mar 2023 07:33) (159/68 - 182/101)  RR: 17 (11 Mar 2023 07:33) (17 - 19)  SpO2: 96% (11 Mar 2023 07:33) (95% - 98%)  I&O's Summary    PHYSICAL EXAM:  General: NAD, A/O x 3  ENT: MMM, no thrush  Neck: Supple, No JVD  Lungs: Clear to auscultation bilaterally, good air entry, non-labored breathing  Cardio: RRR, S1/S2, No murmur  Abdomen: Soft, Nontender, Nondistended; Bowel sounds present  Extremities: No calf tenderness, No pitting edema    LABS:                        16.3   7.71  )-----------( 202      ( 10 Mar 2023 19:43 )             47.4     03-10    142  |  99  |  12.6  ----------------------------<  129  3.3   |  34.0  |  0.43    Ca    9.3      10 Mar 2023 19:43    TPro  6.5  /  Alb  4.0  /  TBili  12.5  /  DBili  x   /  AST  407  /  ALT  625  /  AlkPhos  775  03-10    Lipase, Serum: 50 U/L (03-10-23 @ 19:43)  PT/INR - ( 10 Mar 2023 19:43 )   PT: 12.8 sec;   INR: 1.10 ratio      PTT - ( 10 Mar 2023 19:43 )  PTT:35.0 sec    RADIOLOGY & ADDITIONAL TESTS:  - no new tests    Care Discussed with Consultants/Other Providers:   Gastroenterology Patient is a 62y old  Male who presents with a chief complaint of Obstructive Jaundice  r/o pancreatic mass (11 Mar 2023 04:51)      Patient seen and examined at bedside.      ALLERGIES:  latex (Rash)  No Known Drug Allergies    MEDICATIONS  (STANDING):  amLODIPine   Tablet 2.5 milliGRAM(s) Oral daily  losartan 100 milliGRAM(s) Oral daily  melatonin 5 milliGRAM(s) Oral at bedtime  nebivolol 10 milliGRAM(s) Oral daily  potassium chloride   Powder 40 milliEquivalent(s) Oral two times a day  sodium chloride 0.9% lock flush 3 milliLiter(s) IV Push every 8 hours    MEDICATIONS  (PRN):  acetaminophen     Tablet .. 650 milliGRAM(s) Oral every 6 hours PRN Temp greater or equal to 38C (100.4F), Mild Pain (1 - 3)  aluminum hydroxide/magnesium hydroxide/simethicone Suspension 30 milliLiter(s) Oral every 4 hours PRN Dyspepsia  ondansetron Injectable 4 milliGRAM(s) IV Push every 8 hours PRN Nausea and/or Vomiting    Vital Signs Last 24 Hrs  T(F): 98.3 (11 Mar 2023 07:33), Max: 98.7 (10 Mar 2023 17:05)  HR: 63 (11 Mar 2023 07:33) (56 - 98)  BP: 166/100 (11 Mar 2023 07:33) (159/68 - 182/101)  RR: 17 (11 Mar 2023 07:33) (17 - 19)  SpO2: 96% (11 Mar 2023 07:33) (95% - 98%)  I&O's Summary    PHYSICAL EXAM:  General: NAD, A/O x 3  ENT: MMM, no thrush  Neck: Supple, No JVD  Lungs: Clear to auscultation bilaterally, good air entry, non-labored breathing  Cardio: +s1/s2  Abdomen: Soft, Nontender, Nondistended; Bowel sounds present  Extremities: No calf tenderness, No pitting edema    LABS:                        16.3   7.71  )-----------( 202      ( 10 Mar 2023 19:43 )             47.4     03-10    142  |  99  |  12.6  ----------------------------<  129  3.3   |  34.0  |  0.43    Ca    9.3      10 Mar 2023 19:43    TPro  6.5  /  Alb  4.0  /  TBili  12.5  /  DBili  x   /  AST  407  /  ALT  625  /  AlkPhos  775  03-10    Lipase, Serum: 50 U/L (03-10-23 @ 19:43)  PT/INR - ( 10 Mar 2023 19:43 )   PT: 12.8 sec;   INR: 1.10 ratio      PTT - ( 10 Mar 2023 19:43 )  PTT:35.0 sec    RADIOLOGY & ADDITIONAL TESTS:  - no new tests    Care Discussed with Consultants/Other Providers:   Gastroenterology

## 2023-03-11 NOTE — H&P ADULT - ASSESSMENT
61 y/o male with obstructive jaundice, possible pancreatic head mass, Hx of HTN, Afib, Prothrombin gene mutation    Obstructive Jaundice:  -CT abd/pelvis as above  -F/U MRCP   -GI eval for EUS/biopsy pending MRCP  -CA 19-9   -Hold Pradaxa for now pending timing of EUS/Biopsy, if delayed will need to start on Heparin drip after 4 half lives due to high risk of VTE and now likely malignancy   -VC boots while in bed  -Diet for now  -OOB, ambulate     Afib:  -Cont. Bystolic  -Pradaxa on hold as above, Heparin drip pending timing of EGD/EUS    Prothrombin gene mutation:  -Plan as above  -No recent VTE reported     HTN:  -Resume o/p regimen     Discussed plan with ED, Patient, Wife at bedside. All questions/concerns addressed with no barriers to communication

## 2023-03-11 NOTE — CONSULT NOTE ADULT - NS ATTEND AMEND GEN_ALL_CORE FT
62M with hx of DVT/PE on Dabigatran due to prothrombin gene mutation, HTN, Afib p/w RUQ pain/discomfort associated with jaundice. Labs showed elevated tota bili of >!2 and ALP>700. MRI/MRCP showed 4cm pancreatic head mass with encasement of gastroduodenal artery and double duct sign. GI is asked to evaluate. Continue to hold Dabigatran, may bridge  with IV heparin/Lovenox.   Plan for EUS for pancreatic mass bx and ERCP for bile duct decompression- tentatively scheduled on monday   Monitor LFTs, and signs/symptoms of cholangitis.   ADAT  Ca19-9, oncology consultation.   Patient/family has raised a concern of transferring care of [facility] for future treatment.   GI will follow

## 2023-03-11 NOTE — PROGRESS NOTE ADULT - ASSESSMENT
61 y/o male with obstructive jaundice, possible pancreatic head mass, Hx of HTN, Afib, Prothrombin gene mutation    #Obstructive Jaundice  - 2/2 probable pancreatic mass w/ transaminitis and elevated bilirubin  - mrcp pending   - gi consult appreciated- d/w Dr Vance EUS/ERCP on monday   - hold pradaxa for now given above     #Prothrombin gene mutation:  -Plan as above  -No recent VTE reported     #Afib:  - pradaxa held for now given ercp/eus    #HTN-Essential   - monitor blood pressure  - amlodipine, bystolic, losartan   61 y/o male with obstructive jaundice, possible pancreatic head mass, Hx of HTN, Afib, Prothrombin gene mutation    #Obstructive Jaundice  - 2/2 probable pancreatic mass w/ transaminitis and elevated bilirubin  - mrcp pending   - gi consult appreciated- d/w Dr Vance EUS/ERCP on monday   - hold pradaxa for now given above   - check ct a/p and chest for staging     #Prothrombin gene mutation:  -Plan as above  -No recent VTE reported     #Afib:  - pradaxa held for now given ercp/eus    #HTN-Essential   - monitor blood pressure  - amlodipine, bystolic, losartan    plan of care d/w family bedside

## 2023-03-11 NOTE — CONSULT NOTE ADULT - PROBLEM SELECTOR RECOMMENDATION 9
Possibly secondary to pancreatic mass w/ transaminitis and elevated bilirubin. Patient with hx of biliary pancreatitis, s/p ERCP with stone and sludge removal and cholecystectomy in 2019.    Plan:  - pending MRCP   - Pending CA 19-9 tumor marker   - Will possibly plan for EUS/ERCP with stent placement on Monday   - hold Pradaxa (Last dose 3/9 as per pt)  - Continue to trend CBC, LFTs   - Avoid Hepatotoxic drugs   - Rest of care as per primary team

## 2023-03-11 NOTE — H&P ADULT - HISTORY OF PRESENT ILLNESS
63 y/o male with hx of HTN, Afib, prothombin gene mutation and prior VTE on Pradaxa presents to ED with his Wife c/o dull RUQ pain for the past few weeks, and not with scleral icterus. Patient denies any GI hx, no travel, sick contacts, or exposure to occupational hazards. No fever, weight loss, night sweats. No changes in medications. In the ED patient with elevated BP, no fever. No tender abdominal exam. Scleral icterus and jaundiced. Labs with TB of 12.5, with AP of 775, , and ALT of 625. CT of abd/pelvis with dilated CBD, dilated pancreatic duct and suspicion of pancreatic mass. Wife and Patient aware of diagnosis and agree to be admitted for further w/u of suspected primary pancreatic malignancy.

## 2023-03-12 LAB
ANION GAP SERPL CALC-SCNC: 10 MMOL/L — SIGNIFICANT CHANGE UP (ref 5–17)
ANION GAP SERPL CALC-SCNC: 10 MMOL/L — SIGNIFICANT CHANGE UP (ref 5–17)
BILIRUB DIRECT SERPL-MCNC: >10 MG/DL — HIGH (ref 0–0.3)
BILIRUB INDIRECT FLD-MCNC: SIGNIFICANT CHANGE UP MG/DL (ref 0.2–1)
BILIRUB SERPL-MCNC: 14.1 MG/DL — HIGH (ref 0.4–2)
BUN SERPL-MCNC: 11.7 MG/DL — SIGNIFICANT CHANGE UP (ref 8–20)
BUN SERPL-MCNC: 12.2 MG/DL — SIGNIFICANT CHANGE UP (ref 8–20)
CALCIUM SERPL-MCNC: 8.5 MG/DL — SIGNIFICANT CHANGE UP (ref 8.4–10.5)
CALCIUM SERPL-MCNC: 9.4 MG/DL — SIGNIFICANT CHANGE UP (ref 8.4–10.5)
CHLORIDE SERPL-SCNC: 103 MMOL/L — SIGNIFICANT CHANGE UP (ref 96–108)
CHLORIDE SERPL-SCNC: 104 MMOL/L — SIGNIFICANT CHANGE UP (ref 96–108)
CO2 SERPL-SCNC: 26 MMOL/L — SIGNIFICANT CHANGE UP (ref 22–29)
CO2 SERPL-SCNC: 29 MMOL/L — SIGNIFICANT CHANGE UP (ref 22–29)
CREAT SERPL-MCNC: 0.41 MG/DL — LOW (ref 0.5–1.3)
CREAT SERPL-MCNC: 0.41 MG/DL — LOW (ref 0.5–1.3)
EGFR: 124 ML/MIN/1.73M2 — SIGNIFICANT CHANGE UP
EGFR: 124 ML/MIN/1.73M2 — SIGNIFICANT CHANGE UP
GLUCOSE SERPL-MCNC: 108 MG/DL — HIGH (ref 70–99)
GLUCOSE SERPL-MCNC: 131 MG/DL — HIGH (ref 70–99)
HCT VFR BLD CALC: 45.1 % — SIGNIFICANT CHANGE UP (ref 39–50)
HGB BLD-MCNC: 15.7 G/DL — SIGNIFICANT CHANGE UP (ref 13–17)
MAGNESIUM SERPL-MCNC: 2.2 MG/DL — SIGNIFICANT CHANGE UP (ref 1.6–2.6)
MCHC RBC-ENTMCNC: 30 PG — SIGNIFICANT CHANGE UP (ref 27–34)
MCHC RBC-ENTMCNC: 34.8 GM/DL — SIGNIFICANT CHANGE UP (ref 32–36)
MCV RBC AUTO: 86.1 FL — SIGNIFICANT CHANGE UP (ref 80–100)
PHOSPHATE SERPL-MCNC: 2.7 MG/DL — SIGNIFICANT CHANGE UP (ref 2.4–4.7)
PLATELET # BLD AUTO: 215 K/UL — SIGNIFICANT CHANGE UP (ref 150–400)
POTASSIUM SERPL-MCNC: 2.6 MMOL/L — CRITICAL LOW (ref 3.5–5.3)
POTASSIUM SERPL-MCNC: 3.8 MMOL/L — SIGNIFICANT CHANGE UP (ref 3.5–5.3)
POTASSIUM SERPL-SCNC: 2.6 MMOL/L — CRITICAL LOW (ref 3.5–5.3)
POTASSIUM SERPL-SCNC: 3.8 MMOL/L — SIGNIFICANT CHANGE UP (ref 3.5–5.3)
RBC # BLD: 5.24 M/UL — SIGNIFICANT CHANGE UP (ref 4.2–5.8)
RBC # FLD: 14.5 % — SIGNIFICANT CHANGE UP (ref 10.3–14.5)
SODIUM SERPL-SCNC: 139 MMOL/L — SIGNIFICANT CHANGE UP (ref 135–145)
SODIUM SERPL-SCNC: 143 MMOL/L — SIGNIFICANT CHANGE UP (ref 135–145)
WBC # BLD: 8.56 K/UL — SIGNIFICANT CHANGE UP (ref 3.8–10.5)
WBC # FLD AUTO: 8.56 K/UL — SIGNIFICANT CHANGE UP (ref 3.8–10.5)

## 2023-03-12 PROCEDURE — 99233 SBSQ HOSP IP/OBS HIGH 50: CPT

## 2023-03-12 RX ORDER — POTASSIUM CHLORIDE 20 MEQ
10 PACKET (EA) ORAL
Refills: 0 | Status: COMPLETED | OUTPATIENT
Start: 2023-03-12 | End: 2023-03-12

## 2023-03-12 RX ORDER — ERTAPENEM SODIUM 1 G/1
1000 INJECTION, POWDER, LYOPHILIZED, FOR SOLUTION INTRAMUSCULAR; INTRAVENOUS ONCE
Refills: 0 | Status: COMPLETED | OUTPATIENT
Start: 2023-03-13 | End: 2023-03-14

## 2023-03-12 RX ORDER — POTASSIUM CHLORIDE 20 MEQ
40 PACKET (EA) ORAL ONCE
Refills: 0 | Status: COMPLETED | OUTPATIENT
Start: 2023-03-12 | End: 2023-03-12

## 2023-03-12 RX ORDER — INDOMETHACIN 50 MG
100 CAPSULE ORAL ONCE
Refills: 0 | Status: COMPLETED | OUTPATIENT
Start: 2023-03-13 | End: 2023-03-14

## 2023-03-12 RX ADMIN — Medication 5 MILLIGRAM(S): at 17:06

## 2023-03-12 RX ADMIN — Medication 40 MILLIEQUIVALENT(S): at 17:06

## 2023-03-12 RX ADMIN — AMLODIPINE BESYLATE 2.5 MILLIGRAM(S): 2.5 TABLET ORAL at 05:26

## 2023-03-12 RX ADMIN — NEBIVOLOL HYDROCHLORIDE 10 MILLIGRAM(S): 5 TABLET ORAL at 05:27

## 2023-03-12 RX ADMIN — Medication 40 MILLIEQUIVALENT(S): at 08:31

## 2023-03-12 RX ADMIN — Medication 100 MILLIEQUIVALENT(S): at 07:35

## 2023-03-12 RX ADMIN — Medication 100 MILLIEQUIVALENT(S): at 08:30

## 2023-03-12 RX ADMIN — Medication 100 MILLIEQUIVALENT(S): at 09:50

## 2023-03-12 RX ADMIN — LOSARTAN POTASSIUM 100 MILLIGRAM(S): 100 TABLET, FILM COATED ORAL at 05:26

## 2023-03-12 RX ADMIN — Medication 40 MILLIEQUIVALENT(S): at 05:27

## 2023-03-12 NOTE — PROGRESS NOTE ADULT - SUBJECTIVE AND OBJECTIVE BOX
Chief Complaint:  Patient is a 62y old  Male who presents with a chief complaint of Obstructive Jaundice  r/o pancreatic mass (12 Mar 2023 09:41)      HPI/ 24 hr events: Patient seen and examined at bedside, no overnight events. Pt feeling well this morning. Tolerating diet. Vitals are stable, no leukocytosis, Hemoglobin stable, Tbili trending up at 14.1 today. Denies nausea, vomiting, abdominal pain.       REVIEW OF SYSTEMS:   General: Negative  HEENT: Negative  CV: Negative  Respiratory: Negative  GI: See HPI  : Negative  MSK: Negative  Hematologic: Negative  Skin: Negative    MEDICATIONS:   MEDICATIONS  (STANDING):  amLODIPine   Tablet 2.5 milliGRAM(s) Oral daily  losartan 100 milliGRAM(s) Oral daily  melatonin 5 milliGRAM(s) Oral at bedtime  nebivolol 10 milliGRAM(s) Oral daily  potassium chloride   Powder 40 milliEquivalent(s) Oral two times a day    MEDICATIONS  (PRN):  acetaminophen     Tablet .. 650 milliGRAM(s) Oral every 6 hours PRN Temp greater or equal to 38C (100.4F), Mild Pain (1 - 3)  aluminum hydroxide/magnesium hydroxide/simethicone Suspension 30 milliLiter(s) Oral every 4 hours PRN Dyspepsia  hydrALAZINE Injectable 5 milliGRAM(s) IV Push every 6 hours PRN for sbp>160  ondansetron Injectable 4 milliGRAM(s) IV Push every 8 hours PRN Nausea and/or Vomiting      ALLERGIES:   Allergies    latex (Rash)  No Known Drug Allergies    Intolerances        VITAL SIGNS:   Vital Signs Last 24 Hrs  T(C): 36.7 (12 Mar 2023 04:48), Max: 37.1 (11 Mar 2023 15:42)  T(F): 98 (12 Mar 2023 04:48), Max: 98.8 (11 Mar 2023 15:42)  HR: 56 (12 Mar 2023 04:48) (53 - 57)  BP: 160/100 (12 Mar 2023 10:14) (129/62 - 173/90)  BP(mean): --  RR: 18 (12 Mar 2023 04:48) (18 - 18)  SpO2: 95% (12 Mar 2023 04:48) (95% - 98%)    Parameters below as of 12 Mar 2023 04:48  Patient On (Oxygen Delivery Method): room air      I&O's Summary      PHYSICAL EXAM:   GENERAL:  No acute distress  HEENT:  NC/AT, conjunctiva clear, sclera icteric  CHEST:  No increased effort  HEART:  Regular rhythm  ABDOMEN:  Soft, non-tender, non-distended, normoactive bowel sounds  EXTREMITIES: No edema  SKIN: Jaundice, Warm, dry  NEURO:  Calm, cooperative      LABS:  CBC Full  -  ( 12 Mar 2023 05:12 )  WBC Count : 8.56 K/uL  RBC Count : 5.24 M/uL  Hemoglobin : 15.7 g/dL  Hematocrit : 45.1 %  Platelet Count - Automated : 215 K/uL  Mean Cell Volume : 86.1 fl  Mean Cell Hemoglobin : 30.0 pg  Mean Cell Hemoglobin Concentration : 34.8 gm/dL  Auto Neutrophil # : x  Auto Lymphocyte # : x  Auto Monocyte # : x  Auto Eosinophil # : x  Auto Basophil # : x  Auto Neutrophil % : x  Auto Lymphocyte % : x  Auto Monocyte % : x  Auto Eosinophil % : x  Auto Basophil % : x    03-12    139  |  103  |  12.2  ----------------------------<  131<H>  2.6<LL>   |  26.0  |  0.41<L>    Ca    8.5      12 Mar 2023 05:12  Phos  2.7     03-12  Mg     2.2     03-12    TPro  x   /  Alb  x   /  TBili  14.1<H>  /  DBili  >10.0<H>  /  AST  x   /  ALT  x   /  AlkPhos  x   03-12    LIVER FUNCTIONS - ( 10 Mar 2023 19:43 )  Alb: 4.0 g/dL / Pro: 6.5 g/dL / ALK PHOS: 775 U/L / ALT: 625 U/L / AST: 407 U/L / GGT: x           PT/INR - ( 10 Mar 2023 19:43 )   PT: 12.8 sec;   INR: 1.10 ratio         PTT - ( 10 Mar 2023 19:43 )  PTT:35.0 sec              RADIOLOGY & ADDITIONAL STUDIES:      ACC: [id number] EXAM:  MR MRCP WAW IC       PROCEDURE DATE:  03/11/2023          INTERPRETATION:  CLINICAL INFORMATION: Painless jaundice.    COMPARISON: CT abdomen pelvis 3/10/2023 and 11/2/2019; MRI abdomen   11/2/2019    CONTRAST/COMPLICATIONS:  IV Contrast: Gadavist  9 cc administered   1 cc discarded  Oral Contrast: NONE  Complications: None reported at time of study completion    PROCEDURE:  MRI of the abdomen was performed.  MRCP was performed.    FINDINGS:  LOWER CHEST: Within normal limits.    LIVER: A few scattered small cysts.  BILE DUCTS: Moderate intrahepatic and extra hepatic biliary ductal   dilatation with the common duct measuring 2.0 cm in caliber. Abrupt cut   off at the proximal to mid common bile duct. Prominent cystic duct   remnant with abrupt narrowing at the same level of common bile duct   obstruction.  GALLBLADDER: Cholecystectomy.  SPLEEN: Within normal limits.  PANCREAS: 4.0 x 3.8 cm ill-defined round hypoenhancing pancreatic head   mass that encases the gastroduodenal artery. The mass restricts   diffusion. Dilatation of main pancreatic duct, 0.8 cm in caliber, with   abrupt cut off at the pancreatic head mass. Prominent side branches.  ADRENALS: Within normal limits.  KIDNEYS/URETERS: A few renal cysts bilaterally.    VISUALIZED PORTIONS:  BOWEL: Within normal limits.  PERITONEUM: Trace perisplenic fluid adjacent to the pancreatic tail.  VESSELS: The superior mesenteric artery and veins are patent. Right,   left, and main portal veins are patent. Splenic artery and vein are   patent. Hepatic arteries are patent.  RETROPERITONEUM/LYMPH NODES: No lymphadenopathy.  ABDOMINAL WALL: Within normal limits.  BONES: Within normal limits.    IMPRESSION:  1. 4.0 cm ill-defined roundhypoenhancing pancreatic head mass consistent   with pancreatic adenocarcinoma. The mass encases the gastroduodenal   artery..  2. Double duct sign. The pancreatic head mass obstructs the biliary and   pancreatic ducts with associated moderate intrahepatic and extrahepatic   biliary ductal dilatation.        --- End of Report ---              ACC: [id number] EXAM:  CT ABDOMEN AND PELVIS IC       PROCEDURE DATE:  03/10/2023          INTERPRETATION:  CLINICAL INFORMATION: Abdominal pain, jaundice.    COMPARISON: 11/2/2019 CT and MRI    CONTRAST/COMPLICATIONS:  IV Contrast: Omnipaque 350  90 cc administered   10 cc discarded  Oral Contrast: NONE  Complications: None reported at time of study completion    PROCEDURE:  CT of the Abdomen and Pelvis was performed.  Sagittal and coronal reformats were performed.    FINDINGS:  LOWER CHEST: Mild bibasilar atelectasis.    LIVER: Within normal limits.  BILE DUCTS: Mild to moderate intrahepatic ductal dilatation. CBD   dilatation up to 1.8 cm with abrupt tapering in the region of the   pancreatic head.  GALLBLADDER: Cholecystectomy.  SPLEEN: Within normal limits.  PANCREAS: Dilatation of the pancreatic duct up to 1 cm with abrupt cut   off in the body (3, 40). Peripancreatic haziness/inflammation and   heterogeneous hypoattenuation of the neck, body and tail.  ADRENALS: Within normal limits.  KIDNEYS/URETERS: No hydronephrosis. Bilateral renal cysts and   hypodensities too small to characterize.    BLADDER: Mild wall thickening but under distended.  REPRODUCTIVE ORGANS: Enlarged prostate.    BOWEL: No bowel obstruction. Appendix is normal. Colonic diverticulosis.   Duodenal diverticulum.  PERITONEUM: No ascites.  VESSELS: Atherosclerotic changes.  RETROPERITONEUM/LYMPH NODES: No lymphadenopathy.  ABDOMINAL WALL: Small fat-containing left inguinal hernia.  BONES: Degenerative changes.    IMPRESSION:  Intrahepatic ductal dilatation, CBD dilatation with abrupt tapering and   pancreatic ductal dilatation with focal cut off, highly suspicious for   obstructing pancreatic mass. Peripancreatic inflammatory changes and   heterogeneous hypoattenuation suspicious for superimposed pancreatitis.   Pancreatic mass protocol MRI/MRCP is recommended for further evaluation.        --- End of Report ---            SALAZAR DOTSON MD; Attending Radiologist  This document has been electronically signed. Mar 10 2023 11:20PM  03-10-23 @ 22:19

## 2023-03-12 NOTE — PROGRESS NOTE ADULT - NS ATTEND AMEND GEN_ALL_CORE FT
62M with hx of DVT/PE on Dabigatran due to prothrombin gene mutation, HTN, Afib p/w RUQ pain/discomfort associated with jaundice. Labs showed elevated tota bili of >14 and ALP>700. MRI/MRCP showed 4cm pancreatic head mass with encasement of gastroduodenal artery and double duct sign. GI is asked to evaluate. Continue to hold Dabigatran, may bridge with IV heparin if needed.   Plan for EUS for pancreatic mass bx and ERCP for bile duct decompression- scheduled for tomorrow, NPO after midnight.   Monitor LFTs, and signs/symptoms of cholangitis.   Ca19-9, oncology consultation.   Plan of care discussed with the patient in details. He expressed understanding.   GI will follow

## 2023-03-12 NOTE — PROGRESS NOTE ADULT - SUBJECTIVE AND OBJECTIVE BOX
CC: Follow up     INTERVAL HPI/OVERNIGHT EVENTS: Patient seen and examined, complaints of mild RUQ discomfort       Vital Signs Last 24 Hrs  T(C): 36.7 (12 Mar 2023 04:48), Max: 37.1 (11 Mar 2023 15:42)  T(F): 98 (12 Mar 2023 04:48), Max: 98.8 (11 Mar 2023 15:42)  HR: 56 (12 Mar 2023 04:48) (53 - 57)  BP: 160/100 (12 Mar 2023 10:14) (129/62 - 193/107)  BP(mean): --  RR: 18 (12 Mar 2023 04:48) (17 - 18)  SpO2: 95% (12 Mar 2023 04:48) (95% - 98%)    Parameters below as of 12 Mar 2023 04:48  Patient On (Oxygen Delivery Method): room air        PHYSICAL EXAM:    GENERAL: NAD, AOX3 jaundiced   HEAD:  Atraumatic, Normocephalic  EYES: conjunctiva and sclera icteric   ENMT: Moist mucous membranes  NECK: Supple  CHEST/LUNG: Clear to auscultation bilaterally; No rales, rhonchi, wheezing, or rubs  HEART: Regular rate and rhythm; No murmurs, rubs, or gallops  ABDOMEN: Soft, Nontender, Nondistended; Bowel sounds present  EXTREMITIES:  2+ Peripheral Pulses, No clubbing, cyanosis, or edema        MEDICATIONS  (STANDING):  amLODIPine   Tablet 2.5 milliGRAM(s) Oral daily  losartan 100 milliGRAM(s) Oral daily  melatonin 5 milliGRAM(s) Oral at bedtime  nebivolol 10 milliGRAM(s) Oral daily  potassium chloride   Powder 40 milliEquivalent(s) Oral two times a day    MEDICATIONS  (PRN):  acetaminophen     Tablet .. 650 milliGRAM(s) Oral every 6 hours PRN Temp greater or equal to 38C (100.4F), Mild Pain (1 - 3)  aluminum hydroxide/magnesium hydroxide/simethicone Suspension 30 milliLiter(s) Oral every 4 hours PRN Dyspepsia  hydrALAZINE Injectable 5 milliGRAM(s) IV Push every 6 hours PRN for sbp>160  ondansetron Injectable 4 milliGRAM(s) IV Push every 8 hours PRN Nausea and/or Vomiting      Allergies    latex (Rash)  No Known Drug Allergies    Intolerances          LABS:                          15.7   8.56  )-----------( 215      ( 12 Mar 2023 05:12 )             45.1     03-12    139  |  103  |  12.2  ----------------------------<  131<H>  2.6<LL>   |  26.0  |  0.41<L>    Ca    8.5      12 Mar 2023 05:12  Phos  2.7     03-12  Mg     2.2     03-12    TPro  x   /  Alb  x   /  TBili  14.1<H>  /  DBili  >10.0<H>  /  AST  x   /  ALT  x   /  AlkPhos  x   03-12    PT/INR - ( 10 Mar 2023 19:43 )   PT: 12.8 sec;   INR: 1.10 ratio         PTT - ( 10 Mar 2023 19:43 )  PTT:35.0 sec      RADIOLOGY & ADDITIONAL TESTS:

## 2023-03-12 NOTE — PROGRESS NOTE ADULT - ASSESSMENT
61 y/o male with PMHx of HTN, Afib, prothrombin gene mutation and prior VTE (PE/DVT 15 years ago) on Pradaxa (Last dose 3/9), biliary pancreatitis, s/p ERCP and cholecystectomy in 2019 presents to ED with his Wife c/o dull RUQ pain for the past 10 days, and jaundice x1 day. In the ED patient with elevated BP, no fever. Labs with TB of 12.5, with AP of 775, , and ALT of 625. CT of abd/pelvis showed Intrahepatic ductal dilatation, CBD dilatation with abrupt tapering and pancreatic ductal dilatation with focal cut off, highly suspicious for obstructing pancreatic mass. Peripancreatic inflammatory changes and heterogeneous hypoattenuation suspicious for superimposed pancreatitis.

## 2023-03-12 NOTE — PROGRESS NOTE ADULT - ASSESSMENT
61 y/o male with obstructive jaundice, possible pancreatic head mass, Hx of HTN, Afib, Prothrombin gene mutation who presented to the ER with RUQ and jaundice. In the ER noted to have obstructive jaundice with elevated bilirubin and transaminitis. CT of the abdomen and pelvis consistent with pancreatic head mass. Status post MRCP 4.0cm ill defined hypo enhancing pancreatic head mass consistent with pancreatic adenocarcinoma encasing the gastroduodenal artery obstructing the biliary and pancreatic ducts    Assessment/plan    1. Obstructive jaundice  - CT and MRCP reviewed  - Pancreatic mass obstructin the biliary and pancreatic ducts  - NPO after midnight for ERCP/EUS with stent in AM  -  Pradaxa on hold  - CT chest and head negative for metastasis     2. Prothrombin gene mutation:  - Pradaxa held for ERCP in AM    3. Afib:  - Pradaxa held for now given ercp/eus    4. Hypertension  - On Norvasc, Bystolic and losartan     5. Hypokalemia  - Supplement KCL

## 2023-03-13 LAB
ALBUMIN SERPL ELPH-MCNC: 3.5 G/DL — SIGNIFICANT CHANGE UP (ref 3.3–5.2)
ALP SERPL-CCNC: 803 U/L — HIGH (ref 40–120)
ALT FLD-CCNC: 732 U/L — HIGH
ANION GAP SERPL CALC-SCNC: 11 MMOL/L — SIGNIFICANT CHANGE UP (ref 5–17)
ANION GAP SERPL CALC-SCNC: 13 MMOL/L — SIGNIFICANT CHANGE UP (ref 5–17)
ANION GAP SERPL CALC-SCNC: 14 MMOL/L — SIGNIFICANT CHANGE UP (ref 5–17)
AST SERPL-CCNC: 439 U/L — HIGH
BILIRUB SERPL-MCNC: 15.9 MG/DL — HIGH (ref 0.4–2)
BUN SERPL-MCNC: 10 MG/DL — SIGNIFICANT CHANGE UP (ref 8–20)
BUN SERPL-MCNC: 10.7 MG/DL — SIGNIFICANT CHANGE UP (ref 8–20)
BUN SERPL-MCNC: 11.7 MG/DL — SIGNIFICANT CHANGE UP (ref 8–20)
CALCIUM SERPL-MCNC: 8.6 MG/DL — SIGNIFICANT CHANGE UP (ref 8.4–10.5)
CALCIUM SERPL-MCNC: 8.9 MG/DL — SIGNIFICANT CHANGE UP (ref 8.4–10.5)
CALCIUM SERPL-MCNC: 8.9 MG/DL — SIGNIFICANT CHANGE UP (ref 8.4–10.5)
CANCER AG19-9 SERPL-ACNC: 669 U/ML — HIGH
CHLORIDE SERPL-SCNC: 102 MMOL/L — SIGNIFICANT CHANGE UP (ref 96–108)
CHLORIDE SERPL-SCNC: 103 MMOL/L — SIGNIFICANT CHANGE UP (ref 96–108)
CHLORIDE SERPL-SCNC: 105 MMOL/L — SIGNIFICANT CHANGE UP (ref 96–108)
CO2 SERPL-SCNC: 23 MMOL/L — SIGNIFICANT CHANGE UP (ref 22–29)
CO2 SERPL-SCNC: 25 MMOL/L — SIGNIFICANT CHANGE UP (ref 22–29)
CO2 SERPL-SCNC: 26 MMOL/L — SIGNIFICANT CHANGE UP (ref 22–29)
CREAT SERPL-MCNC: 0.25 MG/DL — LOW (ref 0.5–1.3)
CREAT SERPL-MCNC: 0.29 MG/DL — LOW (ref 0.5–1.3)
CREAT SERPL-MCNC: 0.31 MG/DL — LOW (ref 0.5–1.3)
EGFR: 135 ML/MIN/1.73M2 — SIGNIFICANT CHANGE UP
EGFR: 138 ML/MIN/1.73M2 — SIGNIFICANT CHANGE UP
EGFR: 144 ML/MIN/1.73M2 — SIGNIFICANT CHANGE UP
GLUCOSE SERPL-MCNC: 105 MG/DL — HIGH (ref 70–99)
GLUCOSE SERPL-MCNC: 111 MG/DL — HIGH (ref 70–99)
GLUCOSE SERPL-MCNC: 96 MG/DL — SIGNIFICANT CHANGE UP (ref 70–99)
HCT VFR BLD CALC: 45.1 % — SIGNIFICANT CHANGE UP (ref 39–50)
HGB BLD-MCNC: 15.7 G/DL — SIGNIFICANT CHANGE UP (ref 13–17)
INR BLD: 1.05 RATIO — SIGNIFICANT CHANGE UP (ref 0.88–1.16)
MCHC RBC-ENTMCNC: 30.2 PG — SIGNIFICANT CHANGE UP (ref 27–34)
MCHC RBC-ENTMCNC: 34.8 GM/DL — SIGNIFICANT CHANGE UP (ref 32–36)
MCV RBC AUTO: 86.7 FL — SIGNIFICANT CHANGE UP (ref 80–100)
PLATELET # BLD AUTO: 203 K/UL — SIGNIFICANT CHANGE UP (ref 150–400)
POTASSIUM SERPL-MCNC: 2.8 MMOL/L — CRITICAL LOW (ref 3.5–5.3)
POTASSIUM SERPL-MCNC: 3 MMOL/L — LOW (ref 3.5–5.3)
POTASSIUM SERPL-MCNC: 3 MMOL/L — LOW (ref 3.5–5.3)
POTASSIUM SERPL-SCNC: 2.8 MMOL/L — CRITICAL LOW (ref 3.5–5.3)
POTASSIUM SERPL-SCNC: 3 MMOL/L — LOW (ref 3.5–5.3)
POTASSIUM SERPL-SCNC: 3 MMOL/L — LOW (ref 3.5–5.3)
PROT SERPL-MCNC: 5.9 G/DL — LOW (ref 6.6–8.7)
PROTHROM AB SERPL-ACNC: 12.2 SEC — SIGNIFICANT CHANGE UP (ref 10.5–13.4)
RBC # BLD: 5.2 M/UL — SIGNIFICANT CHANGE UP (ref 4.2–5.8)
RBC # FLD: 15 % — HIGH (ref 10.3–14.5)
SODIUM SERPL-SCNC: 139 MMOL/L — SIGNIFICANT CHANGE UP (ref 135–145)
SODIUM SERPL-SCNC: 141 MMOL/L — SIGNIFICANT CHANGE UP (ref 135–145)
SODIUM SERPL-SCNC: 142 MMOL/L — SIGNIFICANT CHANGE UP (ref 135–145)
WBC # BLD: 9.42 K/UL — SIGNIFICANT CHANGE UP (ref 3.8–10.5)
WBC # FLD AUTO: 9.42 K/UL — SIGNIFICANT CHANGE UP (ref 3.8–10.5)

## 2023-03-13 PROCEDURE — 99233 SBSQ HOSP IP/OBS HIGH 50: CPT

## 2023-03-13 RX ORDER — POTASSIUM CHLORIDE 20 MEQ
40 PACKET (EA) ORAL ONCE
Refills: 0 | Status: DISCONTINUED | OUTPATIENT
Start: 2023-03-13 | End: 2023-03-13

## 2023-03-13 RX ORDER — POTASSIUM CHLORIDE 20 MEQ
10 PACKET (EA) ORAL
Refills: 0 | Status: COMPLETED | OUTPATIENT
Start: 2023-03-13 | End: 2023-03-13

## 2023-03-13 RX ORDER — POTASSIUM CHLORIDE 20 MEQ
40 PACKET (EA) ORAL EVERY 4 HOURS
Refills: 0 | Status: COMPLETED | OUTPATIENT
Start: 2023-03-13 | End: 2023-03-14

## 2023-03-13 RX ORDER — POTASSIUM CHLORIDE 20 MEQ
40 PACKET (EA) ORAL ONCE
Refills: 0 | Status: COMPLETED | OUTPATIENT
Start: 2023-03-13 | End: 2023-03-13

## 2023-03-13 RX ADMIN — Medication 100 MILLIEQUIVALENT(S): at 08:04

## 2023-03-13 RX ADMIN — Medication 100 MILLIEQUIVALENT(S): at 11:20

## 2023-03-13 RX ADMIN — AMLODIPINE BESYLATE 2.5 MILLIGRAM(S): 2.5 TABLET ORAL at 05:57

## 2023-03-13 RX ADMIN — Medication 40 MILLIEQUIVALENT(S): at 22:49

## 2023-03-13 RX ADMIN — Medication 40 MILLIEQUIVALENT(S): at 08:03

## 2023-03-13 RX ADMIN — Medication 100 MILLIEQUIVALENT(S): at 09:26

## 2023-03-13 RX ADMIN — NEBIVOLOL HYDROCHLORIDE 10 MILLIGRAM(S): 5 TABLET ORAL at 05:57

## 2023-03-13 RX ADMIN — LOSARTAN POTASSIUM 100 MILLIGRAM(S): 100 TABLET, FILM COATED ORAL at 05:57

## 2023-03-13 NOTE — PROGRESS NOTE ADULT - ASSESSMENT
63 y/o male with obstructive jaundice, possible pancreatic head mass, Hx of HTN, Afib, Prothrombin gene mutation who presented to the ER with RUQ and jaundice. In the ER noted to have obstructive jaundice with elevated bilirubin and transaminitis. CT of the abdomen and pelvis consistent with pancreatic head mass. Status post MRCP 4.0cm ill defined hypo enhancing pancreatic head mass consistent with pancreatic adenocarcinoma encasing the gastroduodenal artery obstructing the biliary and pancreatic ducts    Assessment/plan    1. Obstructive jaundice  - CT and MRCP reviewed  - Pancreatic mass obstruction the biliary and pancreatic ducts  -  Possible EUS/ERCP; patient and family waiting to speak with Dr Restrepo   -  Pradaxa on hold  - CT chest and head negative for metastasis     2. Prothrombin gene mutation:  - Pradaxa held for ERCP     3. Afib:  - Pradaxa held for now given ercp/eus    4. Hypertension  - On Norvasc, Bystolic and losartan     5. Hypokalemia  - Supplement KCL   - Repeat BMP this afternoon

## 2023-03-13 NOTE — PROVIDER CONTACT NOTE (CRITICAL VALUE NOTIFICATION) - SITUATION
patient is asymptomatic denies cramps, chest pain, N/V.
patient has been trending down with potassium levels

## 2023-03-13 NOTE — PROGRESS NOTE ADULT - NS ATTEND AMEND GEN_ALL_CORE FT
Patient with obstructive jaundice and HOP mass concerning for pancreatic adenocarcinoma, per family request, EUS/FNA to be performed by Dr. Tillman tomorrow. NPO after midnight. Hold anticoagulation.

## 2023-03-13 NOTE — PROGRESS NOTE ADULT - SUBJECTIVE AND OBJECTIVE BOX
Chief Complaint:  Patient is a 62y old  Male who presents with a chief complaint of Obstructive Jaundice  r/o pancreatic mass (13 Mar 2023 11:11)      HPI/ 24 hr events: Patient seen and examined at bedside, no overnight events. ERCP postponed until tomorrow as pt requesting Dr. Tillman to performed procedure. Vitals are stable, no leukocytosis, Hemoglobin stable, Tbili trending up at 15.9 today. Alk phos 803, AST//732. CA 19-9 elevated at 669. Denies nausea, vomiting, abdominal pain.       REVIEW OF SYSTEMS:   General: Negative  HEENT: Negative  CV: Negative  Respiratory: Negative  GI: See HPI  : Negative  MSK: Negative  Hematologic: Negative  Skin: Negative    MEDICATIONS:   MEDICATIONS  (STANDING):  amLODIPine   Tablet 2.5 milliGRAM(s) Oral daily  ertapenem  IVPB 1000 milliGRAM(s) IV Intermittent once  indomethacin Suppository 100 milliGRAM(s) Rectal once  losartan 100 milliGRAM(s) Oral daily  melatonin 5 milliGRAM(s) Oral at bedtime  nebivolol 10 milliGRAM(s) Oral daily    MEDICATIONS  (PRN):  acetaminophen     Tablet .. 650 milliGRAM(s) Oral every 6 hours PRN Temp greater or equal to 38C (100.4F), Mild Pain (1 - 3)  aluminum hydroxide/magnesium hydroxide/simethicone Suspension 30 milliLiter(s) Oral every 4 hours PRN Dyspepsia  hydrALAZINE Injectable 5 milliGRAM(s) IV Push every 6 hours PRN for sbp>160  ondansetron Injectable 4 milliGRAM(s) IV Push every 8 hours PRN Nausea and/or Vomiting      ALLERGIES:   Allergies    latex (Rash)  No Known Drug Allergies    Intolerances        VITAL SIGNS:   Vital Signs Last 24 Hrs  T(C): 36.8 (13 Mar 2023 11:25), Max: 37 (12 Mar 2023 20:45)  T(F): 98.2 (13 Mar 2023 11:25), Max: 98.6 (12 Mar 2023 20:45)  HR: 64 (13 Mar 2023 11:25) (53 - 64)  BP: 156/82 (13 Mar 2023 11:25) (156/82 - 169/95)  BP(mean): --  RR: 18 (13 Mar 2023 11:25) (18 - 19)  SpO2: 96% (13 Mar 2023 11:25) (95% - 96%)    Parameters below as of 13 Mar 2023 11:25  Patient On (Oxygen Delivery Method): room air      I&O's Summary    12 Mar 2023 07:01  -  13 Mar 2023 07:00  --------------------------------------------------------  IN: 800 mL / OUT: 2 mL / NET: 798 mL          PHYSICAL EXAM:   GENERAL:  No acute distress  HEENT:  NC/AT, conjunctiva clear, sclera icteric  CHEST:  No increased effort  HEART:  Regular rhythm  ABDOMEN:  Soft, non-tender, non-distended, normoactive bowel sounds  EXTREMITIES: No edema  SKIN: Jaundice, Warm, dry  NEURO:  Calm, cooperative      LABS:  CBC Full  -  ( 13 Mar 2023 04:53 )  WBC Count : 9.42 K/uL  RBC Count : 5.20 M/uL  Hemoglobin : 15.7 g/dL  Hematocrit : 45.1 %  Platelet Count - Automated : 203 K/uL  Mean Cell Volume : 86.7 fl  Mean Cell Hemoglobin : 30.2 pg  Mean Cell Hemoglobin Concentration : 34.8 gm/dL  Auto Neutrophil # : x  Auto Lymphocyte # : x  Auto Monocyte # : x  Auto Eosinophil # : x  Auto Basophil # : x  Auto Neutrophil % : x  Auto Lymphocyte % : x  Auto Monocyte % : x  Auto Eosinophil % : x  Auto Basophil % : x    03-13    142  |  105  |  11.7  ----------------------------<  111<H>  2.8<LL>   |  23.0  |  0.29<L>    Ca    8.6      13 Mar 2023 04:53  Phos  2.7     03-12  Mg     2.2     03-12    TPro  5.9<L>  /  Alb  3.5  /  TBili  15.9<H>  /  DBili  x   /  AST  439<H>  /  ALT  732<H>  /  AlkPhos  803<H>  03-13    LIVER FUNCTIONS - ( 13 Mar 2023 04:53 )  Alb: 3.5 g/dL / Pro: 5.9 g/dL / ALK PHOS: 803 U/L / ALT: 732 U/L / AST: 439 U/L / GGT: x           PT/INR - ( 13 Mar 2023 04:53 )   PT: 12.2 sec;   INR: 1.05 ratio               CA 19-9  669 U/mL  AFP  --  CEA  --    --  03-13-23 @ 04:53        RADIOLOGY & ADDITIONAL STUDIES:      ACC: [id number] EXAM:  MR MRCP WAW IC       PROCEDURE DATE:  03/11/2023      PROCEDURE:  MRI of the abdomen was performed.  MRCP was performed.    FINDINGS:  LOWER CHEST: Within normal limits.    LIVER: A few scattered small cysts.  BILE DUCTS: Moderate intrahepatic and extra hepatic biliary ductal   dilatation with the common duct measuring 2.0 cm in caliber. Abrupt cut   off at the proximal to mid common bile duct. Prominent cystic duct   remnant with abrupt narrowing at the same level of common bile duct   obstruction.  GALLBLADDER: Cholecystectomy.  SPLEEN: Within normal limits.  PANCREAS: 4.0 x 3.8 cm ill-defined round hypoenhancing pancreatic head   mass that encases the gastroduodenal artery. The mass restricts   diffusion. Dilatation of main pancreatic duct, 0.8 cm in caliber, with   abrupt cut off at the pancreatic head mass. Prominent side branches.  ADRENALS: Within normal limits.  KIDNEYS/URETERS: A few renal cysts bilaterally.    VISUALIZED PORTIONS:  BOWEL: Within normal limits.  PERITONEUM: Trace perisplenic fluid adjacent to the pancreatic tail.  VESSELS: The superior mesenteric artery and veins are patent. Right,   left, and main portal veins are patent. Splenic artery and vein are   patent. Hepatic arteries are patent.  RETROPERITONEUM/LYMPH NODES: No lymphadenopathy.  ABDOMINAL WALL: Within normal limits.  BONES: Within normal limits.    IMPRESSION:  1. 4.0 cm ill-defined roundhypoenhancing pancreatic head mass consistent   with pancreatic adenocarcinoma. The mass encases the gastroduodenal   artery..  2. Double duct sign. The pancreatic head mass obstructs the biliary and   pancreatic ducts with associated moderate intrahepatic and extrahepatic   biliary ductal dilatation.        --- End of Report ---            RENEE BROWN MD; Attending Radiologist  This document has been electronically signed. Mar 11 2023 12:52PM  03-11-23 @ 11:33        ACC: [id number] EXAM:  CT ABDOMEN AND PELVIS IC         PROCEDURE DATE:  03/10/2023        PROCEDURE:  CT of the Abdomen and Pelvis was performed.  Sagittal and coronal reformats were performed.    FINDINGS:  LOWER CHEST: Mild bibasilar atelectasis.    LIVER: Within normal limits.  BILE DUCTS: Mild to moderate intrahepatic ductal dilatation. CBD   dilatation up to 1.8 cm with abrupt tapering in the region of the   pancreatic head.  GALLBLADDER: Cholecystectomy.  SPLEEN: Within normal limits.  PANCREAS: Dilatation of the pancreatic duct up to 1 cm with abrupt cut   off in the body (3, 40). Peripancreatic haziness/inflammation and   heterogeneous hypoattenuation of the neck, body and tail.  ADRENALS: Within normal limits.  KIDNEYS/URETERS: No hydronephrosis. Bilateral renal cysts and   hypodensities too small to characterize.    BLADDER: Mild wall thickening but under distended.  REPRODUCTIVE ORGANS: Enlarged prostate.    BOWEL: No bowel obstruction. Appendix is normal. Colonic diverticulosis.   Duodenal diverticulum.  PERITONEUM: No ascites.  VESSELS: Atherosclerotic changes.  RETROPERITONEUM/LYMPH NODES: No lymphadenopathy.  ABDOMINAL WALL: Small fat-containing left inguinal hernia.  BONES: Degenerative changes.    IMPRESSION:  Intrahepatic ductal dilatation, CBD dilatation with abrupt tapering and   pancreatic ductal dilatation with focal cut off, highly suspicious for   obstructing pancreatic mass. Peripancreatic inflammatory changes and   heterogeneous hypoattenuation suspicious for superimposed pancreatitis.   Pancreatic mass protocol MRI/MRCP is recommended for further evaluation.        --- End of Report ---            SALAZAR DOTSON MD; Attending Radiologist  This document has been electronically signed. Mar 10 2023 11:20PM  03-10-23 @ 22:19

## 2023-03-13 NOTE — PROGRESS NOTE ADULT - SUBJECTIVE AND OBJECTIVE BOX
CC Follow up     NTERVAL HPI/OVERNIGHT EVENTS: Patient seen and examined, complaints or RUQ discomfort this morning       Vital Signs Last 24 Hrs  T(C): 36.8 (13 Mar 2023 04:42), Max: 37 (12 Mar 2023 20:45)  T(F): 98.2 (13 Mar 2023 04:42), Max: 98.6 (12 Mar 2023 20:45)  HR: 64 (13 Mar 2023 04:42) (53 - 64)  BP: 156/82 (13 Mar 2023 04:42) (156/82 - 169/95)  BP(mean): --  RR: 18 (13 Mar 2023 04:42) (18 - 19)  SpO2: 95% (13 Mar 2023 04:42) (95% - 96%)    Parameters below as of 13 Mar 2023 04:42  Patient On (Oxygen Delivery Method): room air        PHYSICAL EXAM:    GENERAL: NAD, AOX3  HEAD:  Atraumatic, Normocephalic  EYES:  conjunctiva and sclera icteric   ENMT: Moist mucous membranes  NECK: Supple,  CHEST/LUNG: Clear to auscultation bilaterally; No rales, rhonchi, wheezing, or rubs  HEART: Regular rate and rhythm; No murmurs, rubs, or gallops  ABDOMEN: Soft, Nontender, Nondistended; Bowel sounds present  EXTREMITIES:  2+ Peripheral Pulses, No clubbing, cyanosis, or edema        MEDICATIONS  (STANDING):  amLODIPine   Tablet 2.5 milliGRAM(s) Oral daily  ertapenem  IVPB 1000 milliGRAM(s) IV Intermittent once  indomethacin Suppository 100 milliGRAM(s) Rectal once  losartan 100 milliGRAM(s) Oral daily  melatonin 5 milliGRAM(s) Oral at bedtime  nebivolol 10 milliGRAM(s) Oral daily  potassium chloride  10 mEq/100 mL IVPB 10 milliEquivalent(s) IV Intermittent every 1 hour    MEDICATIONS  (PRN):  acetaminophen     Tablet .. 650 milliGRAM(s) Oral every 6 hours PRN Temp greater or equal to 38C (100.4F), Mild Pain (1 - 3)  aluminum hydroxide/magnesium hydroxide/simethicone Suspension 30 milliLiter(s) Oral every 4 hours PRN Dyspepsia  hydrALAZINE Injectable 5 milliGRAM(s) IV Push every 6 hours PRN for sbp>160  ondansetron Injectable 4 milliGRAM(s) IV Push every 8 hours PRN Nausea and/or Vomiting      Allergies    latex (Rash)  No Known Drug Allergies    Intolerances          LABS:                          15.7   9.42  )-----------( 203      ( 13 Mar 2023 04:53 )             45.1     03-13    142  |  105  |  11.7  ----------------------------<  111<H>  2.8<LL>   |  23.0  |  0.29<L>    Ca    8.6      13 Mar 2023 04:53  Phos  2.7     03-12  Mg     2.2     03-12    TPro  5.9<L>  /  Alb  3.5  /  TBili  15.9<H>  /  DBili  x   /  AST  439<H>  /  ALT  732<H>  /  AlkPhos  803<H>  03-13    PT/INR - ( 13 Mar 2023 04:53 )   PT: 12.2 sec;   INR: 1.05 ratio               RADIOLOGY & ADDITIONAL TESTS:

## 2023-03-13 NOTE — PROVIDER CONTACT NOTE (CRITICAL VALUE NOTIFICATION) - ACTION/TREATMENT ORDERED:
stat orders for PO potassium supplementation
patient will be supplemented with Potassium replacement

## 2023-03-14 ENCOUNTER — TRANSCRIPTION ENCOUNTER (OUTPATIENT)
Age: 63
End: 2023-03-14

## 2023-03-14 ENCOUNTER — RESULT REVIEW (OUTPATIENT)
Age: 63
End: 2023-03-14

## 2023-03-14 LAB
ALBUMIN SERPL ELPH-MCNC: 3.4 G/DL — SIGNIFICANT CHANGE UP (ref 3.3–5.2)
ALBUMIN SERPL ELPH-MCNC: 3.4 G/DL — SIGNIFICANT CHANGE UP (ref 3.3–5.2)
ALP SERPL-CCNC: 841 U/L — HIGH (ref 40–120)
ALP SERPL-CCNC: 886 U/L — HIGH (ref 40–120)
ALT FLD-CCNC: 763 U/L — HIGH
ALT FLD-CCNC: 788 U/L — HIGH
ANION GAP SERPL CALC-SCNC: 11 MMOL/L — SIGNIFICANT CHANGE UP (ref 5–17)
ANION GAP SERPL CALC-SCNC: 12 MMOL/L — SIGNIFICANT CHANGE UP (ref 5–17)
AST SERPL-CCNC: 458 U/L — HIGH
AST SERPL-CCNC: 479 U/L — HIGH
BILIRUB SERPL-MCNC: 17.8 MG/DL — HIGH (ref 0.4–2)
BILIRUB SERPL-MCNC: 19.8 MG/DL — HIGH (ref 0.4–2)
BLD GP AB SCN SERPL QL: SIGNIFICANT CHANGE UP
BUN SERPL-MCNC: 10.4 MG/DL — SIGNIFICANT CHANGE UP (ref 8–20)
BUN SERPL-MCNC: 11.2 MG/DL — SIGNIFICANT CHANGE UP (ref 8–20)
CALCIUM SERPL-MCNC: 8.5 MG/DL — SIGNIFICANT CHANGE UP (ref 8.4–10.5)
CALCIUM SERPL-MCNC: 8.7 MG/DL — SIGNIFICANT CHANGE UP (ref 8.4–10.5)
CHLORIDE SERPL-SCNC: 105 MMOL/L — SIGNIFICANT CHANGE UP (ref 96–108)
CHLORIDE SERPL-SCNC: 106 MMOL/L — SIGNIFICANT CHANGE UP (ref 96–108)
CO2 SERPL-SCNC: 23 MMOL/L — SIGNIFICANT CHANGE UP (ref 22–29)
CO2 SERPL-SCNC: 24 MMOL/L — SIGNIFICANT CHANGE UP (ref 22–29)
CREAT SERPL-MCNC: 0.24 MG/DL — LOW (ref 0.5–1.3)
CREAT SERPL-MCNC: <0.2 MG/DL — LOW (ref 0.5–1.3)
EGFR: 146 ML/MIN/1.73M2 — SIGNIFICANT CHANGE UP
EGFR: 154 ML/MIN/1.73M2 — SIGNIFICANT CHANGE UP
GLUCOSE SERPL-MCNC: 100 MG/DL — HIGH (ref 70–99)
GLUCOSE SERPL-MCNC: 109 MG/DL — HIGH (ref 70–99)
MAGNESIUM SERPL-MCNC: 1.9 MG/DL — SIGNIFICANT CHANGE UP (ref 1.8–2.6)
POTASSIUM SERPL-MCNC: 3 MMOL/L — LOW (ref 3.5–5.3)
POTASSIUM SERPL-MCNC: 3.2 MMOL/L — LOW (ref 3.5–5.3)
POTASSIUM SERPL-SCNC: 3 MMOL/L — LOW (ref 3.5–5.3)
POTASSIUM SERPL-SCNC: 3.2 MMOL/L — LOW (ref 3.5–5.3)
PROT SERPL-MCNC: 5.7 G/DL — LOW (ref 6.6–8.7)
PROT SERPL-MCNC: 5.8 G/DL — LOW (ref 6.6–8.7)
SARS-COV-2 RNA SPEC QL NAA+PROBE: SIGNIFICANT CHANGE UP
SODIUM SERPL-SCNC: 140 MMOL/L — SIGNIFICANT CHANGE UP (ref 135–145)
SODIUM SERPL-SCNC: 140 MMOL/L — SIGNIFICANT CHANGE UP (ref 135–145)

## 2023-03-14 PROCEDURE — 88305 TISSUE EXAM BY PATHOLOGIST: CPT | Mod: 26

## 2023-03-14 PROCEDURE — 43242 EGD US FINE NEEDLE BX/ASPIR: CPT | Mod: 59

## 2023-03-14 PROCEDURE — 43274 ERCP DUCT STENT PLACEMENT: CPT

## 2023-03-14 PROCEDURE — 88173 CYTOPATH EVAL FNA REPORT: CPT | Mod: 26

## 2023-03-14 PROCEDURE — 88342 IMHCHEM/IMCYTCHM 1ST ANTB: CPT | Mod: 26

## 2023-03-14 PROCEDURE — 99233 SBSQ HOSP IP/OBS HIGH 50: CPT

## 2023-03-14 PROCEDURE — 43239 EGD BIOPSY SINGLE/MULTIPLE: CPT | Mod: 59

## 2023-03-14 DEVICE — STENT BIL WALL RX FC RMV US 10X80MM
Type: IMPLANTABLE DEVICE | Status: NON-FUNCTIONAL
Removed: 2023-03-14

## 2023-03-14 RX ORDER — PANTOPRAZOLE SODIUM 20 MG/1
20 TABLET, DELAYED RELEASE ORAL ONCE
Refills: 0 | Status: COMPLETED | OUTPATIENT
Start: 2023-03-14 | End: 2023-03-14

## 2023-03-14 RX ORDER — METOPROLOL TARTRATE 50 MG
5 TABLET ORAL ONCE
Refills: 0 | Status: COMPLETED | OUTPATIENT
Start: 2023-03-14 | End: 2023-03-14

## 2023-03-14 RX ORDER — KETOROLAC TROMETHAMINE 30 MG/ML
15 SYRINGE (ML) INJECTION ONCE
Refills: 0 | Status: DISCONTINUED | OUTPATIENT
Start: 2023-03-14 | End: 2023-03-14

## 2023-03-14 RX ORDER — HYDRALAZINE HCL 50 MG
10 TABLET ORAL ONCE
Refills: 0 | Status: COMPLETED | OUTPATIENT
Start: 2023-03-14 | End: 2023-03-14

## 2023-03-14 RX ORDER — ALPRAZOLAM 0.25 MG
0.25 TABLET ORAL DAILY
Refills: 0 | Status: DISCONTINUED | OUTPATIENT
Start: 2023-03-14 | End: 2023-03-15

## 2023-03-14 RX ADMIN — Medication 15 MILLIGRAM(S): at 21:56

## 2023-03-14 RX ADMIN — Medication 5 MILLIGRAM(S): at 21:57

## 2023-03-14 RX ADMIN — Medication 40 MILLIEQUIVALENT(S): at 02:04

## 2023-03-14 RX ADMIN — Medication 0.25 MILLIGRAM(S): at 18:35

## 2023-03-14 RX ADMIN — LOSARTAN POTASSIUM 100 MILLIGRAM(S): 100 TABLET, FILM COATED ORAL at 05:04

## 2023-03-14 RX ADMIN — AMLODIPINE BESYLATE 2.5 MILLIGRAM(S): 2.5 TABLET ORAL at 05:04

## 2023-03-14 RX ADMIN — Medication 5 MILLIGRAM(S): at 18:27

## 2023-03-14 RX ADMIN — Medication 5 MILLIGRAM(S): at 11:33

## 2023-03-14 RX ADMIN — Medication 100 MILLIGRAM(S): at 15:44

## 2023-03-14 RX ADMIN — PANTOPRAZOLE SODIUM 20 MILLIGRAM(S): 20 TABLET, DELAYED RELEASE ORAL at 05:05

## 2023-03-14 RX ADMIN — ERTAPENEM SODIUM 120 MILLIGRAM(S): 1 INJECTION, POWDER, LYOPHILIZED, FOR SOLUTION INTRAMUSCULAR; INTRAVENOUS at 14:40

## 2023-03-14 RX ADMIN — ONDANSETRON 4 MILLIGRAM(S): 8 TABLET, FILM COATED ORAL at 17:56

## 2023-03-14 RX ADMIN — Medication 650 MILLIGRAM(S): at 17:56

## 2023-03-14 RX ADMIN — Medication 10 MILLIGRAM(S): at 13:59

## 2023-03-14 RX ADMIN — NEBIVOLOL HYDROCHLORIDE 10 MILLIGRAM(S): 5 TABLET ORAL at 05:04

## 2023-03-14 NOTE — PROGRESS NOTE ADULT - SUBJECTIVE AND OBJECTIVE BOX
CC: Follow up     INTERVAL HPI/OVERNIGHT EVENTS: Patient seen and examined, complaints of intermittent RUQ pain    Vital Signs Last 24 Hrs  T(C): 36.9 (14 Mar 2023 10:57), Max: 37.1 (13 Mar 2023 17:38)  T(F): 98.5 (14 Mar 2023 10:57), Max: 98.7 (13 Mar 2023 17:38)  HR: 55 (14 Mar 2023 10:57) (55 - 66)  BP: 172/99 (14 Mar 2023 12:37) (164/95 - 178/99)  BP(mean): --  RR: 18 (14 Mar 2023 10:57) (18 - 18)  SpO2: 93% (14 Mar 2023 10:57) (93% - 96%)    Parameters below as of 14 Mar 2023 10:57  Patient On (Oxygen Delivery Method): room air        PHYSICAL EXAM:    GENERAL: NAD, AOX3 jaundice  HEAD:  Atraumatic, Normocephalic  EYES:  conjunctiva and sclera icteric   ENMT: Moist mucous membranes  NECK: Supple,  CHEST/LUNG: Clear to auscultation bilaterally; No rales, rhonchi, wheezing, or rubs  HEART: Regular rate and rhythm; No murmurs, rubs, or gallops  ABDOMEN: Soft, Nontender, Nondistended; Bowel sounds present  EXTREMITIES:  2+ Peripheral Pulses, No clubbing, cyanosis, or edema        MEDICATIONS  (STANDING):  amLODIPine   Tablet 2.5 milliGRAM(s) Oral daily  ertapenem  IVPB 1000 milliGRAM(s) IV Intermittent once  indomethacin Suppository 100 milliGRAM(s) Rectal once  losartan 100 milliGRAM(s) Oral daily  melatonin 5 milliGRAM(s) Oral at bedtime  nebivolol 10 milliGRAM(s) Oral daily    MEDICATIONS  (PRN):  acetaminophen     Tablet .. 650 milliGRAM(s) Oral every 6 hours PRN Temp greater or equal to 38C (100.4F), Mild Pain (1 - 3)  aluminum hydroxide/magnesium hydroxide/simethicone Suspension 30 milliLiter(s) Oral every 4 hours PRN Dyspepsia  hydrALAZINE Injectable 5 milliGRAM(s) IV Push every 6 hours PRN for sbp>160  ondansetron Injectable 4 milliGRAM(s) IV Push every 8 hours PRN Nausea and/or Vomiting      Allergies    latex (Rash)  No Known Drug Allergies    Intolerances          LABS:                          15.7   9.42  )-----------( 203      ( 13 Mar 2023 04:53 )             45.1     03-14    140  |  106  |  10.4  ----------------------------<  100<H>  3.2<L>   |  23.0  |  <0.20<L>    Ca    8.7      14 Mar 2023 10:25  Mg     1.9     03-14    TPro  5.8<L>  /  Alb  3.4  /  TBili  19.8<H>  /  DBili  x   /  AST  479<H>  /  ALT  788<H>  /  AlkPhos  886<H>  03-14    PT/INR - ( 13 Mar 2023 04:53 )   PT: 12.2 sec;   INR: 1.05 ratio               RADIOLOGY & ADDITIONAL TESTS:

## 2023-03-14 NOTE — CONSULT NOTE ADULT - ASSESSMENT
61 y/o male with PMHx of HTN, Afib, prothrombin gene mutation and prior VTE (PE/DVT 15 years ago) on Pradaxa (Last dose 3/9), biliary pancreatitis, s/p ERCP and cholecystectomy in 2019 presents to ED with his Wife c/o dull RUQ pain for the past 10 days, and jaundice x1 day. In the ED patient with elevated BP, no fever. Labs with TB of 12.5, with AP of 775, , and ALT of 625. CT of abd/pelvis showed Intrahepatic ductal dilatation, CBD dilatation with abrupt tapering and pancreatic ductal dilatation with focal cut off, highly suspicious for obstructing pancreatic mass. Peripancreatic inflammatory changes and heterogeneous hypoattenuation suspicious for superimposed pancreatitis.     
ASSESSMENT: Patient is a 62ym pmhx HTN, Afib, and prothrombin deficiency admitted for painless jaundice and found to have an obstructing pancreatic head mass.     PLAN:    - Patient to be discussed at tumor board   - F/U EUS/ERCP  - Surgical plan pending discussions  - Will follow along  - Plan discussed with Attending, Dr. Roque

## 2023-03-14 NOTE — PROGRESS NOTE ADULT - ASSESSMENT
63 y/o male with obstructive jaundice, possible pancreatic head mass, Hx of HTN, Afib, Prothrombin gene mutation who presented to the ER with RUQ and jaundice. In the ER noted to have obstructive jaundice with elevated bilirubin and transaminitis. CT of the abdomen and pelvis consistent with pancreatic head mass. Status post MRCP 4.0cm ill defined hypo enhancing pancreatic head mass consistent with pancreatic adenocarcinoma encasing the gastroduodenal artery obstructing the biliary and pancreatic ducts    Assessment/plan    1. Obstructive jaundice  - CT and MRCP reviewed  - Pancreatic mass obstruction the biliary and pancreatic ducts  -  EUS/ERCP today  - Surgical oncology consulted   -  Pradaxa on hold  - CT chest and head negative for metastasis     2. Prothrombin gene mutation:  - Pradaxa held for ERCP     3. Afib:  - Pradaxa held for now given ercp/eus    4. Hypertension  - On Norvasc, Bystolic and losartan     5. Hypokalemia  - Supplement KCL        63 y/o male with obstructive jaundice, possible pancreatic head mass, Hx of HTN, Afib, Prothrombin gene mutation who presented to the ER with RUQ and jaundice. In the ER noted to have obstructive jaundice with elevated bilirubin and transaminitis. CT of the abdomen and pelvis consistent with pancreatic head mass. Status post MRCP 4.0cm ill defined hypo enhancing pancreatic head mass consistent with pancreatic adenocarcinoma encasing the gastroduodenal artery obstructing the biliary and pancreatic ducts    Assessment/plan    1. Obstructive jaundice  - CT and MRCP reviewed  - Pancreatic mass obstruction the biliary and pancreatic ducts  -  EUS/ERCP today  - Surgical oncology consulted   -  Pradaxa on hold  - CT chest and head negative for metastasis     2. Prothrombin gene mutation:  - Pradaxa held for ERCP     3. Chronic atrial fibrillation  - Pradaxa held for now given ercp/eus    4. Hypertension  - On Norvasc, Bystolic and losartan     5. Hypokalemia  - Supplement KCL

## 2023-03-14 NOTE — CONSULT NOTE ADULT - SUBJECTIVE AND OBJECTIVE BOX
SURGICAL ONCOLOGY CONSULT NOTE  --------------------------------------------------------------------------------------------    HPI: Patient is a 62y old  Male who presents with a chief complaint of jaundice with intermittent abdominal pain. Patient states his pain started about 2wks ago and has been intermittent in RUQ and epigastrium. Was getting worked up by GI and seen ~6d ago. At that time he noticed his urine was darker than usual. Over the next few days he noticed his stool was paler and his skin began turning yellow. Denies any N/V. He has noticed some poor PO intake 2/2 this intermittent abdominal pain but no unintended weight loss.     HPI:  61 y/o male with hx of HTN, Afib, prothombin gene mutation and prior VTE on Pradaxa presents to ED with his Wife c/o dull RUQ pain for the past few weeks, and not with scleral icterus. Patient denies any GI hx, no travel, sick contacts, or exposure to occupational hazards. No fever, weight loss, night sweats. No changes in medications. In the ED patient with elevated BP, no fever. No tender abdominal exam. Scleral icterus and jaundiced. Labs with TB of 12.5, with AP of 775, , and ALT of 625. CT of abd/pelvis with dilated CBD, dilated pancreatic duct and suspicion of pancreatic mass. Wife and Patient aware of diagnosis and agree to be admitted for further w/u of suspected primary pancreatic malignancy.  (11 Mar 2023 04:51)        PAST MEDICAL & SURGICAL HISTORY:  A-fib  Hypertension  Prothrombin deficiency      No significant past surgical history        FAMILY HISTORY:  FH: HTN (hypertension)    [] Family history not pertinent as reviewed with the patient and family    SOCIAL HISTORY:    - current 15 cigarette/day smoker, 30yr h/o smoking  - social EtOH use    ALLERGIES: latex (Rash)  No Known Drug Allergies      HOME MEDICATIONS:     CURRENT MEDICATIONS  MEDICATIONS (STANDING): amLODIPine   Tablet 2.5 milliGRAM(s) Oral daily  losartan 100 milliGRAM(s) Oral daily  melatonin 5 milliGRAM(s) Oral at bedtime  nebivolol 10 milliGRAM(s) Oral daily    MEDICATIONS (PRN):acetaminophen     Tablet .. 650 milliGRAM(s) Oral every 6 hours PRN Temp greater or equal to 38C (100.4F), Mild Pain (1 - 3)  ALPRAZolam 0.25 milliGRAM(s) Oral daily PRN for anxiety  aluminum hydroxide/magnesium hydroxide/simethicone Suspension 30 milliLiter(s) Oral every 4 hours PRN Dyspepsia  hydrALAZINE Injectable 5 milliGRAM(s) IV Push every 6 hours PRN for sbp>160  ondansetron Injectable 4 milliGRAM(s) IV Push every 8 hours PRN Nausea and/or Vomiting    --------------------------------------------------------------------------------------------    Vitals:   T(C): 36.9 (03-14-23 @ 10:57), Max: 37 (03-13-23 @ 20:39)  HR: 55 (03-14-23 @ 10:57) (55 - 66)  BP: 172/99 (03-14-23 @ 12:37) (164/95 - 178/99)  RR: 18 (03-14-23 @ 10:57) (18 - 18)  SpO2: 93% (03-14-23 @ 10:57) (93% - 96%)      03-13 @ 07:01  -  03-14 @ 07:00  --------------------------------------------------------  IN:  Total IN: 0 mL    OUT:    Voided (mL): 1 mL  Total OUT: 1 mL    Total NET: -1 mL    Weight (kg): 99.8 (03-10 @ 17:05)      PHYSICAL EXAM:   General: NAD, Lying in bed comfortably  Neuro: A+Ox3  HEENT: NC/AT, scleral icterus appreciated  Resp: Good effort, no conversational dyspnea  GI/Abd: Soft, NT/ND, no rebound/guarding, no masses palpated  Vascular: All 4 extremities warm.  Skin: diffuse jaundice, no skin breaks or lesions    --------------------------------------------------------------------------------------------    LABS    BMP (03-14 @ 10:25)             140     |  106     |  10.4  		Ca++ --      Ca 8.7                ---------------------------------( 100<H>		Mg --                 3.2<L>  |  23.0    |  <0.20<L>			Ph --      BMP (03-14 @ 02:26)             140     |  105     |  11.2  		Ca++ --      Ca 8.5                ---------------------------------( 109<H>		Mg 1.9                3.0<L>  |  24.0    |  0.24<L>			Ph --        LFTs (03-14 @ 10:25)      TPro 5.8<L> / Alb 3.4 / TBili 19.8<H> / DBili -- / <H> / <H> / AlkPhos 886<H>  LFTs (03-14 @ 02:26)      TPro 5.7<L> / Alb 3.4 / TBili 17.8<H> / DBili -- / <H> / <H> / AlkPhos 841<H>    --------------------------------------------------------------------------------------------    IMAGING  < from: MR MRCP w/wo IV Cont (03.11.23 @ 11:33) >  FINDINGS:  LOWER CHEST: Within normal limits.    LIVER: A few scattered small cysts.  BILE DUCTS: Moderate intrahepatic and extra hepatic biliary ductal   dilatation with the common duct measuring 2.0 cm in caliber. Abrupt cut   off at the proximal to mid common bile duct. Prominent cystic duct   remnant with abrupt narrowing at the same level of common bile duct   obstruction.  GALLBLADDER: Cholecystectomy.  SPLEEN: Within normal limits.  PANCREAS: 4.0 x 3.8 cm ill-defined round hypoenhancing pancreatic head   mass that encases the gastroduodenal artery. The mass restricts   diffusion. Dilatation of main pancreatic duct, 0.8 cm in caliber, with   abrupt cut off at the pancreatic head mass. Prominent side branches.  ADRENALS: Within normal limits.  KIDNEYS/URETERS: A few renal cysts bilaterally.    VISUALIZED PORTIONS:  BOWEL: Within normal limits.  PERITONEUM: Trace perisplenic fluid adjacent to the pancreatic tail.  VESSELS: The superior mesenteric artery and veins are patent. Right,   left, and main portal veins are patent. Splenic artery and vein are   patent. Hepatic arteries are patent.  RETROPERITONEUM/LYMPH NODES: No lymphadenopathy.  ABDOMINAL WALL: Within normal limits.  BONES: Within normal limits.    IMPRESSION:  1. 4.0 cm ill-defined roundhypoenhancing pancreatic head mass consistent   with pancreatic adenocarcinoma. The mass encases the gastroduodenal   artery..  2. Double duct sign. The pancreatic head mass obstructs the biliary and   pancreatic ducts with associated moderate intrahepatic and extrahepatic   biliary ductal dilatation.    < end of copied text >      --------------------------------------------------------------------------------------------     SURGICAL ONCOLOGY CONSULT NOTE  --------------------------------------------------------------------------------------------    HPI: Patient is a 62y old  Male who presents with a chief complaint of jaundice with intermittent abdominal pain. Patient states his pain started about 2wks ago and has been intermittent in RUQ and epigastrium. Was getting worked up by GI and seen ~6d ago. At that time he noticed his urine was darker than usual. Over the next few days he noticed his stool was paler and his skin began turning yellow. Denies any N/V. He has noticed some poor PO intake 2/2 this intermittent abdominal pain but no unintended weight loss. He is a daily 15 cigarette/day smoker. He denies any personal history of cancer, two of his sisters have cancer one of he kidneys and the other with breast. No pancreatic cancer in family. No prior colonoscopies.    Admission HPI: 61 y/o male with hx of HTN, Afib, prothombin gene mutation and prior VTE on Pradaxa presents to ED with his Wife c/o dull RUQ pain for the past few weeks, and not with scleral icterus. Patient denies any GI hx, no travel, sick contacts, or exposure to occupational hazards. No fever, weight loss, night sweats. No changes in medications. In the ED patient with elevated BP, no fever. No tender abdominal exam. Scleral icterus and jaundiced. Labs with TB of 12.5, with AP of 775, , and ALT of 625. CT of abd/pelvis with dilated CBD, dilated pancreatic duct and suspicion of pancreatic mass. Wife and Patient aware of diagnosis and agree to be admitted for further w/u of suspected primary pancreatic malignancy.  (11 Mar 2023 04:51)        PAST MEDICAL & SURGICAL HISTORY:  A-fib  Hypertension  Prothrombin deficiency      No significant past surgical history        FAMILY HISTORY:  FH: HTN (hypertension)    [] Family history not pertinent as reviewed with the patient and family    SOCIAL HISTORY:    - current 15 cigarette/day smoker, 30yr h/o smoking  - social EtOH use    ALLERGIES: latex (Rash)  No Known Drug Allergies      HOME MEDICATIONS:     CURRENT MEDICATIONS  MEDICATIONS (STANDING): amLODIPine   Tablet 2.5 milliGRAM(s) Oral daily  losartan 100 milliGRAM(s) Oral daily  melatonin 5 milliGRAM(s) Oral at bedtime  nebivolol 10 milliGRAM(s) Oral daily    MEDICATIONS (PRN):acetaminophen     Tablet .. 650 milliGRAM(s) Oral every 6 hours PRN Temp greater or equal to 38C (100.4F), Mild Pain (1 - 3)  ALPRAZolam 0.25 milliGRAM(s) Oral daily PRN for anxiety  aluminum hydroxide/magnesium hydroxide/simethicone Suspension 30 milliLiter(s) Oral every 4 hours PRN Dyspepsia  hydrALAZINE Injectable 5 milliGRAM(s) IV Push every 6 hours PRN for sbp>160  ondansetron Injectable 4 milliGRAM(s) IV Push every 8 hours PRN Nausea and/or Vomiting    --------------------------------------------------------------------------------------------    Vitals:   T(C): 36.9 (03-14-23 @ 10:57), Max: 37 (03-13-23 @ 20:39)  HR: 55 (03-14-23 @ 10:57) (55 - 66)  BP: 172/99 (03-14-23 @ 12:37) (164/95 - 178/99)  RR: 18 (03-14-23 @ 10:57) (18 - 18)  SpO2: 93% (03-14-23 @ 10:57) (93% - 96%)      03-13 @ 07:01  -  03-14 @ 07:00  --------------------------------------------------------  IN:  Total IN: 0 mL    OUT:    Voided (mL): 1 mL  Total OUT: 1 mL    Total NET: -1 mL    Weight (kg): 99.8 (03-10 @ 17:05)      PHYSICAL EXAM:   General: NAD, Lying in bed comfortably  Neuro: A+Ox3  HEENT: NC/AT, scleral icterus appreciated  Resp: Good effort, no conversational dyspnea  GI/Abd: Soft, NT/ND, no rebound/guarding, no masses palpated  Vascular: All 4 extremities warm.  Skin: diffuse jaundice, no skin breaks or lesions    --------------------------------------------------------------------------------------------    LABS    BMP (03-14 @ 10:25)             140     |  106     |  10.4  		Ca++ --      Ca 8.7                ---------------------------------( 100<H>		Mg --                 3.2<L>  |  23.0    |  <0.20<L>			Ph --      BMP (03-14 @ 02:26)             140     |  105     |  11.2  		Ca++ --      Ca 8.5                ---------------------------------( 109<H>		Mg 1.9                3.0<L>  |  24.0    |  0.24<L>			Ph --        LFTs (03-14 @ 10:25)      TPro 5.8<L> / Alb 3.4 / TBili 19.8<H> / DBili -- / <H> / <H> / AlkPhos 886<H>  LFTs (03-14 @ 02:26)      TPro 5.7<L> / Alb 3.4 / TBili 17.8<H> / DBili -- / <H> / <H> / AlkPhos 841<H>    --------------------------------------------------------------------------------------------    IMAGING  < from: MR MRCP w/wo IV Cont (03.11.23 @ 11:33) >  FINDINGS:  LOWER CHEST: Within normal limits.    LIVER: A few scattered small cysts.  BILE DUCTS: Moderate intrahepatic and extra hepatic biliary ductal   dilatation with the common duct measuring 2.0 cm in caliber. Abrupt cut   off at the proximal to mid common bile duct. Prominent cystic duct   remnant with abrupt narrowing at the same level of common bile duct   obstruction.  GALLBLADDER: Cholecystectomy.  SPLEEN: Within normal limits.  PANCREAS: 4.0 x 3.8 cm ill-defined round hypoenhancing pancreatic head   mass that encases the gastroduodenal artery. The mass restricts   diffusion. Dilatation of main pancreatic duct, 0.8 cm in caliber, with   abrupt cut off at the pancreatic head mass. Prominent side branches.  ADRENALS: Within normal limits.  KIDNEYS/URETERS: A few renal cysts bilaterally.    VISUALIZED PORTIONS:  BOWEL: Within normal limits.  PERITONEUM: Trace perisplenic fluid adjacent to the pancreatic tail.  VESSELS: The superior mesenteric artery and veins are patent. Right,   left, and main portal veins are patent. Splenic artery and vein are   patent. Hepatic arteries are patent.  RETROPERITONEUM/LYMPH NODES: No lymphadenopathy.  ABDOMINAL WALL: Within normal limits.  BONES: Within normal limits.    IMPRESSION:  1. 4.0 cm ill-defined roundhypoenhancing pancreatic head mass consistent   with pancreatic adenocarcinoma. The mass encases the gastroduodenal   artery..  2. Double duct sign. The pancreatic head mass obstructs the biliary and   pancreatic ducts with associated moderate intrahepatic and extrahepatic   biliary ductal dilatation.    < end of copied text >      --------------------------------------------------------------------------------------------

## 2023-03-15 ENCOUNTER — TRANSCRIPTION ENCOUNTER (OUTPATIENT)
Age: 63
End: 2023-03-15

## 2023-03-15 LAB
ALBUMIN SERPL ELPH-MCNC: 3.6 G/DL — SIGNIFICANT CHANGE UP (ref 3.3–5.2)
ALP SERPL-CCNC: 851 U/L — HIGH (ref 40–120)
ALT FLD-CCNC: 734 U/L — HIGH
ANION GAP SERPL CALC-SCNC: 11 MMOL/L — SIGNIFICANT CHANGE UP (ref 5–17)
AST SERPL-CCNC: 295 U/L — HIGH
BILIRUB SERPL-MCNC: 13.6 MG/DL — HIGH (ref 0.4–2)
BUN SERPL-MCNC: 16 MG/DL — SIGNIFICANT CHANGE UP (ref 8–20)
CALCIUM SERPL-MCNC: 8.9 MG/DL — SIGNIFICANT CHANGE UP (ref 8.4–10.5)
CHLORIDE SERPL-SCNC: 103 MMOL/L — SIGNIFICANT CHANGE UP (ref 96–108)
CO2 SERPL-SCNC: 25 MMOL/L — SIGNIFICANT CHANGE UP (ref 22–29)
CREAT SERPL-MCNC: 0.51 MG/DL — SIGNIFICANT CHANGE UP (ref 0.5–1.3)
EGFR: 116 ML/MIN/1.73M2 — SIGNIFICANT CHANGE UP
GLUCOSE SERPL-MCNC: 127 MG/DL — HIGH (ref 70–99)
HCT VFR BLD CALC: 41.8 % — SIGNIFICANT CHANGE UP (ref 39–50)
HGB BLD-MCNC: 14.9 G/DL — SIGNIFICANT CHANGE UP (ref 13–17)
MCHC RBC-ENTMCNC: 30.2 PG — SIGNIFICANT CHANGE UP (ref 27–34)
MCHC RBC-ENTMCNC: 35.6 GM/DL — SIGNIFICANT CHANGE UP (ref 32–36)
MCV RBC AUTO: 84.8 FL — SIGNIFICANT CHANGE UP (ref 80–100)
PLATELET # BLD AUTO: 209 K/UL — SIGNIFICANT CHANGE UP (ref 150–400)
POTASSIUM SERPL-MCNC: 3.3 MMOL/L — LOW (ref 3.5–5.3)
POTASSIUM SERPL-SCNC: 3.3 MMOL/L — LOW (ref 3.5–5.3)
PROT SERPL-MCNC: 5.8 G/DL — LOW (ref 6.6–8.7)
RBC # BLD: 4.93 M/UL — SIGNIFICANT CHANGE UP (ref 4.2–5.8)
RBC # FLD: 15.4 % — HIGH (ref 10.3–14.5)
SODIUM SERPL-SCNC: 139 MMOL/L — SIGNIFICANT CHANGE UP (ref 135–145)
WBC # BLD: 10.07 K/UL — SIGNIFICANT CHANGE UP (ref 3.8–10.5)
WBC # FLD AUTO: 10.07 K/UL — SIGNIFICANT CHANGE UP (ref 3.8–10.5)

## 2023-03-15 PROCEDURE — 83690 ASSAY OF LIPASE: CPT

## 2023-03-15 PROCEDURE — 74329 X-RAY FOR PANCREAS ENDOSCOPY: CPT

## 2023-03-15 PROCEDURE — 80048 BASIC METABOLIC PNL TOTAL CA: CPT

## 2023-03-15 PROCEDURE — 84100 ASSAY OF PHOSPHORUS: CPT

## 2023-03-15 PROCEDURE — 88342 IMHCHEM/IMCYTCHM 1ST ANTB: CPT

## 2023-03-15 PROCEDURE — 87637 SARSCOV2&INF A&B&RSV AMP PRB: CPT

## 2023-03-15 PROCEDURE — 74177 CT ABD & PELVIS W/CONTRAST: CPT | Mod: MG

## 2023-03-15 PROCEDURE — U0005: CPT

## 2023-03-15 PROCEDURE — G1004: CPT

## 2023-03-15 PROCEDURE — 70450 CT HEAD/BRAIN W/O DYE: CPT

## 2023-03-15 PROCEDURE — 82248 BILIRUBIN DIRECT: CPT

## 2023-03-15 PROCEDURE — 86301 IMMUNOASSAY TUMOR CA 19-9: CPT

## 2023-03-15 PROCEDURE — 83735 ASSAY OF MAGNESIUM: CPT

## 2023-03-15 PROCEDURE — 85027 COMPLETE CBC AUTOMATED: CPT

## 2023-03-15 PROCEDURE — 86850 RBC ANTIBODY SCREEN: CPT

## 2023-03-15 PROCEDURE — 71250 CT THORAX DX C-: CPT

## 2023-03-15 PROCEDURE — 85025 COMPLETE CBC W/AUTO DIFF WBC: CPT

## 2023-03-15 PROCEDURE — U0003: CPT

## 2023-03-15 PROCEDURE — 86900 BLOOD TYPING SEROLOGIC ABO: CPT

## 2023-03-15 PROCEDURE — 80053 COMPREHEN METABOLIC PANEL: CPT

## 2023-03-15 PROCEDURE — 74183 MRI ABD W/O CNTR FLWD CNTR: CPT | Mod: MG

## 2023-03-15 PROCEDURE — 85610 PROTHROMBIN TIME: CPT

## 2023-03-15 PROCEDURE — 99239 HOSP IP/OBS DSCHRG MGMT >30: CPT

## 2023-03-15 PROCEDURE — 85730 THROMBOPLASTIN TIME PARTIAL: CPT

## 2023-03-15 PROCEDURE — 99233 SBSQ HOSP IP/OBS HIGH 50: CPT

## 2023-03-15 PROCEDURE — 88305 TISSUE EXAM BY PATHOLOGIST: CPT

## 2023-03-15 PROCEDURE — 82247 BILIRUBIN TOTAL: CPT

## 2023-03-15 PROCEDURE — C1874: CPT

## 2023-03-15 PROCEDURE — 36415 COLL VENOUS BLD VENIPUNCTURE: CPT

## 2023-03-15 PROCEDURE — 86901 BLOOD TYPING SEROLOGIC RH(D): CPT

## 2023-03-15 PROCEDURE — 88173 CYTOPATH EVAL FNA REPORT: CPT

## 2023-03-15 PROCEDURE — 99285 EMERGENCY DEPT VISIT HI MDM: CPT

## 2023-03-15 RX ORDER — KETOROLAC TROMETHAMINE 30 MG/ML
15 SYRINGE (ML) INJECTION ONCE
Refills: 0 | Status: DISCONTINUED | OUTPATIENT
Start: 2023-03-15 | End: 2023-03-15

## 2023-03-15 RX ORDER — PANTOPRAZOLE SODIUM 20 MG/1
1 TABLET, DELAYED RELEASE ORAL
Qty: 30 | Refills: 0
Start: 2023-03-15 | End: 2023-04-13

## 2023-03-15 RX ORDER — POTASSIUM CHLORIDE 20 MEQ
1 PACKET (EA) ORAL
Qty: 14 | Refills: 0
Start: 2023-03-15 | End: 2023-03-28

## 2023-03-15 RX ORDER — PANTOPRAZOLE SODIUM 20 MG/1
40 TABLET, DELAYED RELEASE ORAL
Refills: 0 | Status: DISCONTINUED | OUTPATIENT
Start: 2023-03-15 | End: 2023-03-15

## 2023-03-15 RX ORDER — POTASSIUM CHLORIDE 20 MEQ
40 PACKET (EA) ORAL ONCE
Refills: 0 | Status: DISCONTINUED | OUTPATIENT
Start: 2023-03-15 | End: 2023-03-15

## 2023-03-15 RX ADMIN — NEBIVOLOL HYDROCHLORIDE 10 MILLIGRAM(S): 5 TABLET ORAL at 05:39

## 2023-03-15 RX ADMIN — Medication 15 MILLIGRAM(S): at 03:50

## 2023-03-15 RX ADMIN — LOSARTAN POTASSIUM 100 MILLIGRAM(S): 100 TABLET, FILM COATED ORAL at 05:39

## 2023-03-15 RX ADMIN — AMLODIPINE BESYLATE 2.5 MILLIGRAM(S): 2.5 TABLET ORAL at 05:39

## 2023-03-15 NOTE — DISCHARGE NOTE PROVIDER - NSDCMRMEDTOKEN_GEN_ALL_CORE_FT
acetaminophen 325 mg oral tablet: 2 tab(s) orally every 6 hours, As needed, Mild Pain (1 - 3)  Bystolic 10 mg oral tablet: 1 tab(s) orally once a day  enalapril:   hydroCHLOROthiazide 12.5 mg oral tablet: 1 tab(s) orally once a day  losartan 100 mg oral tablet: 1 tab(s) orally once a day  Norvasc 2.5 mg oral tablet: 1 tab(s) orally once a day  pantoprazole 40 mg oral delayed release tablet: 1 tab(s) orally once a day (before a meal)  Pradaxa 150 mg oral capsule: 1 cap(s) orally 2 times a day   Bystolic 10 mg oral tablet: 1 tab(s) orally once a day  hydroCHLOROthiazide 12.5 mg oral tablet: 1 tab(s) orally once a day  losartan 100 mg oral tablet: 1 tab(s) orally once a day  Norvasc 2.5 mg oral tablet: 1 tab(s) orally once a day  pantoprazole 40 mg oral delayed release tablet: 1 tab(s) orally once a day (before a meal)  potassium chloride 20 mEq oral tablet, extended release: 1 tab(s) orally once   Pradaxa 150 mg oral capsule: 1 cap(s) orally 2 times a day

## 2023-03-15 NOTE — PROGRESS NOTE ADULT - SUBJECTIVE AND OBJECTIVE BOX
CC: Follow up     [facility]TER[facility] HPI/OVERNIGHT EVENTS: Patient seen and examined, mild RUQ abdominal pain. Tolerating PO intake. Denies nausea or vomiting.       Vital Signs Last 24 Hrs  T(C): 36.7 (15 Mar 2023 04:45), Max: 37.2 (14 Mar 2023 18:05)  T(F): 98.1 (15 Mar 2023 04:45), Max: 98.9 (14 Mar 2023 18:05)  HR: 55 (15 Mar 2023 04:45) (55 - 64)  BP: 143/78 (15 Mar 2023 04:45) (143/78 - 177/105)  BP(mean): 115 (14 Mar 2023 18:05) (115 - 115)  RR: 18 (15 Mar 2023 04:45) (18 - 18)  SpO2: 94% (15 Mar 2023 04:45) (93% - 94%)    Parameters below as of 14 Mar 2023 10:57  Patient On (Oxygen Delivery Method): room air        PHYSICAL EXAM:    GENERAL: NAD, AOX3 jaundice   HEAD:  Atraumatic, Normocephalic  EYES: conjunctiva and sclera icteric   ENMT: Moist mucous membranes  NECK: Supple  CHEST/LUNG: Clear to auscultation bilaterally; No rales, rhonchi, wheezing, or rubs  HEART: Regular rate and rhythm; No murmurs, rubs, or gallops  ABDOMEN: Soft, Nontender, Nondistended; Bowel sounds present  EXTREMITIES:  2+ Peripheral Pulses, No clubbing, cyanosis, or edema        MEDICATIONS  (STANDING):  amLODIPine   Tablet 2.5 milliGRAM(s) Oral daily  losartan 100 milliGRAM(s) Oral daily  melatonin 5 milliGRAM(s) Oral at bedtime  nebivolol 10 milliGRAM(s) Oral daily  pantoprazole    Tablet 40 milliGRAM(s) Oral before breakfast  potassium chloride    Tablet ER 40 milliEquivalent(s) Oral once    MEDICATIONS  (PRN):  acetaminophen     Tablet .. 650 milliGRAM(s) Oral every 6 hours PRN Temp greater or equal to 38C (100.4F), Mild Pain (1 - 3)  ALPRAZolam 0.25 milliGRAM(s) Oral daily PRN for anxiety  aluminum hydroxide/magnesium hydroxide/simethicone Suspension 30 milliLiter(s) Oral every 4 hours PRN Dyspepsia  hydrALAZINE Injectable 5 milliGRAM(s) IV Push every 6 hours PRN for sbp>160  ondansetron Injectable 4 milliGRAM(s) IV Push every 8 hours PRN Nausea and/or Vomiting      Allergies    latex (Rash)  No Known Drug Allergies    Intolerances          LABS:                          14.9   10.07 )-----------( 209      ( 15 Mar 2023 06:24 )             41.8     03-15    139  |  103  |  16.0  ----------------------------<  127<H>  3.3<L>   |  25.0  |  0.51    Ca    8.9      15 Mar 2023 06:24  Mg     1.9     03-14    TPro  5.8<L>  /  Alb  3.6  /  TBili  13.6<H>  /  DBili  x   /  AST  295<H>  /  ALT  734<H>  /  AlkPhos  851<H>  03-15          RADIOLOGY & ADDITIONAL TESTS:

## 2023-03-15 NOTE — PROGRESS NOTE ADULT - NS ATTEND AMEND GEN_ALL_CORE FT
62 M HTN, Afib, prothrombin gene mutation and prior VTE (PE/DVT 15 years ago) on Pradaxa (Last dose 3/9), biliary pancreatitis, s/p ERCP and cholecystectomy in 2019 now PD head mass s/p EUS/bx yesterday and CBD stent for 3 cm stricture.   TB improving, pt afebrile, pain-free.   Will need esophageal brushings for Candida and EUS FNA f/u as outpatient, but stable for discharge from GI perspective  Tolerating regular diet.

## 2023-03-15 NOTE — DISCHARGE NOTE NURSING/CASE MANAGEMENT/SOCIAL WORK - NSDCPEFALRISK_GEN_ALL_CORE
For information on Fall & Injury Prevention, visit: [email or web address] OR  [email or web address] OR  https://www.cdc.gov/steadi/patient.html

## 2023-03-15 NOTE — PROGRESS NOTE ADULT - ASSESSMENT
63 y/o male with PMHx of HTN, Afib, prothrombin gene mutation and prior VTE (PE/DVT 15 years ago) on Pradaxa (Last dose 3/9), biliary pancreatitis, s/p ERCP and cholecystectomy in 2019 presents to ED with his Wife c/o dull RUQ pain for the past 10 days, and jaundice x1 day. In the ED patient with elevated BP, no fever. Labs with TB of 12.5, with AP of 775, , and ALT of 625. CT of abd/pelvis showed Intrahepatic ductal dilatation, CBD dilatation with abrupt tapering and pancreatic ductal dilatation with focal cut off, highly suspicious for obstructing pancreatic mass.

## 2023-03-15 NOTE — PROGRESS NOTE ADULT - SUBJECTIVE AND OBJECTIVE BOX
Chief Complaint:  Patient is a 62y old  Male who presents with a chief complaint of Obstructive Jaundice.     HPI/ 24 hr events: Patient seen and examined at bedside, no overnight events. S/p ERCP, sphincterectomy,  biliary stent placement yesterday. Reports mild right upper quadrant tenderness. Tolerating diet. Denies fever, chills, abdominal pain, nausea, vomiting.     REVIEW OF SYSTEMS:   General: Negative  HEENT: Negative  CV: Negative  Respiratory: Negative  GI: See HPI  : Negative  MSK: Negative  Hematologic: Negative  Skin: Negative    PAST MEDICAL/SURGICAL HISTORY:  A-fib    Hypertension    Prothrombin deficiency    No significant past surgical history      MEDICATIONS  (STANDING):  amLODIPine   Tablet 2.5 milliGRAM(s) Oral daily  losartan 100 milliGRAM(s) Oral daily  melatonin 5 milliGRAM(s) Oral at bedtime  nebivolol 10 milliGRAM(s) Oral daily  pantoprazole    Tablet 40 milliGRAM(s) Oral before breakfast  potassium chloride    Tablet ER 40 milliEquivalent(s) Oral once    MEDICATIONS  (PRN):  acetaminophen     Tablet .. 650 milliGRAM(s) Oral every 6 hours PRN Temp greater or equal to 38C (100.4F), Mild Pain (1 - 3)  ALPRAZolam 0.25 milliGRAM(s) Oral daily PRN for anxiety  aluminum hydroxide/magnesium hydroxide/simethicone Suspension 30 milliLiter(s) Oral every 4 hours PRN Dyspepsia  hydrALAZINE Injectable 5 milliGRAM(s) IV Push every 6 hours PRN for sbp>160  ondansetron Injectable 4 milliGRAM(s) IV Push every 8 hours PRN Nausea and/or Vomiting    latex (Rash)  No Known Drug Allergies    T(C): 36.7 (03-15-23 @ 04:45), Max: 37.2 (03-14-23 @ 18:05)  HR: 55 (03-15-23 @ 04:45) (55 - 64)  BP: 143/78 (03-15-23 @ 04:45) (143/78 - 172/99)  RR: 18 (03-15-23 @ 04:45) (18 - 18)  SpO2: 94% (03-15-23 @ 04:45) (94% - 94%)      PHYSICAL EXAM:  Constitutional: No acute distress  Neuro: Awake alert, oriented to person, place and situation, non-focal, speech clear and intact  HEENT: icteric sclerae  Neck: supple, no JVD  CV: regular rate, regular rhythm  Pulm/chest: lung sounds CTA and equal bilaterally, no accessory muscle use noted  Abd: soft,  RUQ tenderness.  nondistended +bowel sounds. No rigidity, rebound tenderness, or guarding  Ext: no Cyanosis, clubbing or edema  Skin: warm,  jaundiced   Psych: calm, cooperative      LABS:               14.9   10.07 )-----------( 209      ( 03-15 @ 06:24 )             41.8                15.7   9.42  )-----------( 203      ( 03-13 @ 04:53 )             45.1       03-15    139  |  103  |  16.0  ----------------------------<  127<H>  3.3<L>   |  25.0  |  0.51    Ca    8.9      15 Mar 2023 06:24  Mg     1.9     03-14    TPro  5.8<L>  /  Alb  3.6  /  TBili  13.6<H>  /  DBili  x   /  AST  295<H>  /  ALT  734<H>  /  AlkPhos  851<H>  03-15    LIVER FUNCTIONS - ( 15 Mar 2023 06:24 )  Alb: 3.6 g/dL / Pro: 5.8 g/dL / ALK PHOS: 851 U/L / ALT: 734 U/L / AST: 295 U/L / GGT: x               < from: MR MRCP w/wo IV Cont (03.11.23 @ 11:33) >  study completion    PROCEDURE:  MRI of the abdomen was performed.  MRCP was performed.    FINDINGS:  LOWER CHEST: Within normal limits.    LIVER: A few scattered small cysts.  BILE DUCTS: Moderate intrahepatic and extra hepatic biliary ductal   dilatation with the common duct measuring 2.0 cm in caliber. Abrupt cut   off at the proximal to mid common bile duct. Prominent cystic duct   remnant with abrupt narrowing at the same level of common bile duct   obstruction.  GALLBLADDER: Cholecystectomy.  SPLEEN: Within normal limits.  PANCREAS: 4.0 x 3.8 cm ill-defined round hypoenhancing pancreatic head   mass that encases the gastroduodenal artery. The mass restricts   diffusion. Dilatation of main pancreatic duct, 0.8 cm in caliber, with   abrupt cut off at the pancreatic head mass. Prominent side branches.  ADRENALS: Within normal limits.  KIDNEYS/URETERS: A few renal cysts bilaterally.    VISUALIZED PORTIONS:  BOWEL: Within normal limits.  PERITONEUM: Trace perisplenic fluid adjacent to the pancreatic tail.  VESSELS: The superior mesenteric artery and veins are patent. Right,   left, and main portal veins are patent. Splenic artery and vein are   patent. Hepatic arteries are patent.  RETROPERITONEUM/LYMPH NODES: No lymphadenopathy.  ABDOMINAL WALL: Within normal limits.  BONES: Within normal limits.    IMPRESSION:  1. 4.0 cm ill-defined roundhypoenhancing pancreatic head mass consistent   with pancreatic adenocarcinoma. The mass encases the gastroduodenal   artery..  2. Double duct sign. The pancreatic head mass obstructs the biliary and   pancreatic ducts with associated moderate intrahepatic and extrahepatic   biliary ductal dilatation.      < end of copied text >      < from: ERCP (03.14.23 @ 15:52) >      ERCP Findings:         radiograph was taken. Limited views of the esophagus, stomach and duodenum    were unremarkable. There was duodenal diverticulum noted. Major papilla was    noted on the 4 o'clock position of the duodenal diverticulum. Using the [place]    [employer]ific Jagtome 39 with 0.025 inch hydrophilic guidewire, biliary cannulation    was achieved without any difficulty. Cholangiogram was performed confirming the    location which revealed dilated bile duct. and a distal bile duct stricture.    Then using the 12 to 15 mm balloon, occluding cholangiogram was performed. Prior    sphincterotomy was noted. There was a 3 cm long distal bile duct stricture    noted. Then we placed a transpapillary 10 mm x 80 mm fully covered [place]    Scientific wall flex stent in the bile duct under endoscopic and fluoroscopy    guidance. The pancreatic duct was not injected in this exam. The scope was    withdrawn and procedure terminated. Post-procedure xray did not show air under    the diaphragm        Impressions:        Duodenal diverticulum.        Bile duct stricture.        Summary:        Biliary stent placement        Plan:        Return to floor for further management        Advance diet as tolerated        LFT's - Hepatic Panel        Refer to Surgery        Kingsley Tillman MD    < end of copied text >   Chief Complaint:  Patient is a 62y old  Male who presents with a chief complaint of Obstructive Jaundice/pancreatic head mass.     HPI/ 24 hr events: Patient seen and examined at bedside, no overnight events. S/p ERCP, sphincterectomy,  biliary stent placement yesterday. Reports mild right upper quadrant tenderness. Tolerating diet. Denies fever, chills, abdominal pain, nausea, vomiting.     REVIEW OF SYSTEMS:   General: Negative  HEENT: Negative  CV: Negative  Respiratory: Negative  GI: See HPI  : Negative  MSK: Negative  Hematologic: Negative  Skin: Negative    PAST MEDICAL/SURGICAL HISTORY:  A-fib    Hypertension    Prothrombin deficiency    No significant past surgical history      MEDICATIONS  (STANDING):  amLODIPine   Tablet 2.5 milliGRAM(s) Oral daily  losartan 100 milliGRAM(s) Oral daily  melatonin 5 milliGRAM(s) Oral at bedtime  nebivolol 10 milliGRAM(s) Oral daily  pantoprazole    Tablet 40 milliGRAM(s) Oral before breakfast  potassium chloride    Tablet ER 40 milliEquivalent(s) Oral once    MEDICATIONS  (PRN):  acetaminophen     Tablet .. 650 milliGRAM(s) Oral every 6 hours PRN Temp greater or equal to 38C (100.4F), Mild Pain (1 - 3)  ALPRAZolam 0.25 milliGRAM(s) Oral daily PRN for anxiety  aluminum hydroxide/magnesium hydroxide/simethicone Suspension 30 milliLiter(s) Oral every 4 hours PRN Dyspepsia  hydrALAZINE Injectable 5 milliGRAM(s) IV Push every 6 hours PRN for sbp>160  ondansetron Injectable 4 milliGRAM(s) IV Push every 8 hours PRN Nausea and/or Vomiting    latex (Rash)  No Known Drug Allergies    T(C): 36.7 (03-15-23 @ 04:45), Max: 37.2 (03-14-23 @ 18:05)  HR: 55 (03-15-23 @ 04:45) (55 - 64)  BP: 143/78 (03-15-23 @ 04:45) (143/78 - 172/99)  RR: 18 (03-15-23 @ 04:45) (18 - 18)  SpO2: 94% (03-15-23 @ 04:45) (94% - 94%)      PHYSICAL EXAM:  Constitutional: No acute distress  Neuro: Awake alert, oriented to person, place and situation, non-focal, speech clear and intact  HEENT: icteric sclerae  Neck: supple, no JVD  CV: regular rate, regular rhythm  Pulm/chest: lung sounds CTA and equal bilaterally, no accessory muscle use noted  Abd: soft,  RUQ tenderness.  nondistended +bowel sounds. No rigidity, rebound tenderness, or guarding  Ext: no Cyanosis, clubbing or edema  Skin: warm,  jaundiced   Psych: calm, cooperative      LABS:               14.9   10.07 )-----------( 209      ( 03-15 @ 06:24 )             41.8                15.7   9.42  )-----------( 203      ( 03-13 @ 04:53 )             45.1       03-15    139  |  103  |  16.0  ----------------------------<  127<H>  3.3<L>   |  25.0  |  0.51    Ca    8.9      15 Mar 2023 06:24  Mg     1.9     03-14    TPro  5.8<L>  /  Alb  3.6  /  TBili  13.6<H> improved from 19.8 yesterday  /  DBili  x   /  AST  295<H>  /  ALT  734<H>  /  AlkPhos  851<H>  03-15    LIVER FUNCTIONS - ( 15 Mar 2023 06:24 )  Alb: 3.6 g/dL / Pro: 5.8 g/dL / ALK PHOS: 851 U/L / ALT: 734 U/L / AST: 295 U/L / GGT: x               < from: MR MRCP w/wo IV Cont (03.11.23 @ 11:33) >  study completion    PROCEDURE:  MRI of the abdomen was performed.  MRCP was performed.    FINDINGS:  LOWER CHEST: Within normal limits.    LIVER: A few scattered small cysts.  BILE DUCTS: Moderate intrahepatic and extra hepatic biliary ductal   dilatation with the common duct measuring 2.0 cm in caliber. Abrupt cut   off at the proximal to mid common bile duct. Prominent cystic duct   remnant with abrupt narrowing at the same level of common bile duct   obstruction.  GALLBLADDER: Cholecystectomy.  SPLEEN: Within normal limits.  PANCREAS: 4.0 x 3.8 cm ill-defined round hypoenhancing pancreatic head   mass that encases the gastroduodenal artery. The mass restricts   diffusion. Dilatation of main pancreatic duct, 0.8 cm in caliber, with   abrupt cut off at the pancreatic head mass. Prominent side branches.  ADRENALS: Within normal limits.  KIDNEYS/URETERS: A few renal cysts bilaterally.    VISUALIZED PORTIONS:  BOWEL: Within normal limits.  PERITONEUM: Trace perisplenic fluid adjacent to the pancreatic tail.  VESSELS: The superior mesenteric artery and veins are patent. Right,   left, and main portal veins are patent. Splenic artery and vein are   patent. Hepatic arteries are patent.  RETROPERITONEUM/LYMPH NODES: No lymphadenopathy.  ABDOMINAL WALL: Within normal limits.  BONES: Within normal limits.    IMPRESSION:  1. 4.0 cm ill-defined roundhypoenhancing pancreatic head mass consistent   with pancreatic adenocarcinoma. The mass encases the gastroduodenal   artery..  2. Double duct sign. The pancreatic head mass obstructs the biliary and   pancreatic ducts with associated moderate intrahepatic and extrahepatic   biliary ductal dilatation.      < end of copied text >      < from: ERCP (03.14.23 @ 15:52) >      ERCP Findings:         radiograph was taken. Limited views of the esophagus, stomach and duodenum    were unremarkable. There was duodenal diverticulum noted. Major papilla was    noted on the 4 o'clock position of the duodenal diverticulum. Using the [place]    Scientific Jagtome 39 with 0.025 inch hydrophilic guidewire, biliary cannulation    was achieved without any difficulty. Cholangiogram was performed confirming the    location which revealed dilated bile duct. and a distal bile duct stricture.    Then using the 12 to 15 mm balloon, occluding cholangiogram was performed. Prior    sphincterotomy was noted. There was a 3 cm long distal bile duct stricture    noted. Then we placed a transpapillary 10 mm x 80 mm fully covered [place]    Scientific wall flex stent in the bile duct under endoscopic and fluoroscopy    guidance. The pancreatic duct was not injected in this exam. The scope was    withdrawn and procedure terminated. Post-procedure xray did not show air under    the diaphragm        Impressions:        Duodenal diverticulum.        Bile duct stricture.        Summary:        Biliary stent placement        Plan:        Return to floor for further management        Advance diet as tolerated        LFT's - Hepatic Panel        Refer to Surgery        Kingsley Tillman MD    < end of copied text >

## 2023-03-15 NOTE — DISCHARGE NOTE PROVIDER - CARE PROVIDER_API CALL
Kingsley Tillman)  Gastroenterology; Internal Medicine  [address] [place] [address]  Phone: [phone]  Fax: [phone]  Follow Up Time:    Kingsley Tillman)  Gastroenterology; Internal Medicine  [address] [place] [address]  Phone: [phone]  Fax: [phone]  Follow Up Time: 1 week    Jose Roque)  Complex General Surgical Oncology; Surgery; Surgical Oncology  [address] [place] [address]  Phone: [phone]  Fax: [phone]  Follow Up Time: 1 week

## 2023-03-15 NOTE — DISCHARGE NOTE NURSING/CASE MANAGEMENT/SOCIAL WORK - NSDCPETBCESMAN_GEN_ALL_CORE
Refill request. Please advise.    If you are a smoker, it is important for your health to stop smoking. Please be aware that second hand smoke is also harmful.

## 2023-03-15 NOTE — DISCHARGE NOTE PROVIDER - ATTENDING DISCHARGE PHYSICAL EXAMINATION:
Vital Signs Last 24 Hrs  T(C): 36.7 (15 Mar 2023 04:45), Max: 37.2 (14 Mar 2023 18:05)  T(F): 98.1 (15 Mar 2023 04:45), Max: 98.9 (14 Mar 2023 18:05)  HR: 55 (15 Mar 2023 04:45) (55 - 64)  BP: 143/78 (15 Mar 2023 04:45) (143/78 - 177/105)  BP(mean): 115 (14 Mar 2023 18:05) (115 - 115)  RR: 18 (15 Mar 2023 04:45) (18 - 18)  SpO2: 94% (15 Mar 2023 04:45) (93% - 94%)    Parameters below as of 14 Mar 2023 10:57  Patient On (Oxygen Delivery Method): room air        PHYSICAL EXAM:    GENERAL: NAD,AOX3   HEAD:  Atraumatic, Normocephalic  EYES: conjunctiva and sclera icteric   ENMT: Moist mucous membranes  NECK: Supple  CHEST/LUNG: Clear to auscultation bilaterally; No rales, rhonchi, wheezing, or rubs  HEART: Regular rate and rhythm; No murmurs, rubs, or gallops  ABDOMEN: Soft, Nontender, Nondistended; Bowel sounds present  EXTREMITIES:  2+ Peripheral Pulses, No clubbing, cyanosis, or edema        MEDICATIONS  (STANDING):  amLODIPine   Tablet 2.5 milliGRAM(s) Oral daily  losartan 100 milliGRAM(s) Oral daily  melatonin 5 milliGRAM(s) Oral at bedtime  nebivolol 10 milliGRAM(s) Oral daily  pantoprazole    Tablet 40 milliGRAM(s) Oral before breakfast  potassium chloride    Tablet ER 40 milliEquivalent(s) Oral once    MEDICATIONS  (PRN):  acetaminophen     Tablet .. 650 milliGRAM(s) Oral every 6 hours PRN Temp greater or equal to 38C (100.4F), Mild Pain (1 - 3)  ALPRAZolam 0.25 milliGRAM(s) Oral daily PRN for anxiety  aluminum hydroxide/magnesium hydroxide/simethicone Suspension 30 milliLiter(s) Oral every 4 hours PRN Dyspepsia  hydrALAZINE Injectable 5 milliGRAM(s) IV Push every 6 hours PRN for sbp>160  ondansetron Injectable 4 milliGRAM(s) IV Push every 8 hours PRN Nausea and/or Vomiting      Allergies    latex (Rash)  No Known Drug Allergies    Intolerances          LABS:                          14.9   10.07 )-----------( 209      ( 15 Mar 2023 06:24 )             41.8     03-15    139  |  103  |  16.0  ----------------------------<  127<H>  3.3<L>   |  25.0  |  0.51    Ca    8.9      15 Mar 2023 06:24  Mg     1.9     03-14    TPro  5.8<L>  /  Alb  3.6  /  TBili  13.6<H>  /  DBili  x   /  AST  295<H>  /  ALT  734<H>  /  AlkPhos  851<H>  03-15          RADIOLOGY & ADDITIONAL TESTS:

## 2023-03-15 NOTE — DISCHARGE NOTE PROVIDER - NSDCCPCAREPLAN_GEN_ALL_CORE_FT
PRINCIPAL DISCHARGE DIAGNOSIS  Diagnosis: Pancreatic mass  Assessment and Plan of Treatment: with Obstructive jaundice  - Status post ERCP and EUS with biliary stent placement and Biopsy  - Follow up with Dr Tillman and Dr Roque in 1 week for results of biopsy and further recommendations      SECONDARY DISCHARGE DIAGNOSES  Diagnosis: Gastritis  Assessment and Plan of Treatment: Protonix PO once a day before breakfast    Diagnosis: Hypokalemia  Assessment and Plan of Treatment: PO potassium supplementation 20 meq once a day  Follow up with your PMD for repeat blood work in 1 week

## 2023-03-15 NOTE — DISCHARGE NOTE NURSING/CASE MANAGEMENT/SOCIAL WORK - PATIENT PORTAL LINK FT
You can access the FollowMyHealth Patient Portal offered by [facility] by registering at the following website: [email or web address]. By joining [employer]’s FollowMyHealth portal, you will also be able to view your health information using other applications (apps) compatible with our system.

## 2023-03-15 NOTE — PROGRESS NOTE ADULT - ASSESSMENT
63 y/o male with obstructive jaundice, possible pancreatic head mass, Hx of HTN, Afib, Prothrombin gene mutation who presented to the ER with RUQ and jaundice. In the ER noted to have obstructive jaundice with elevated bilirubin and transaminitis. CT of the abdomen and pelvis consistent with pancreatic head mass. Status post MRCP 4.0cm ill defined hypo enhancing pancreatic head mass consistent with pancreatic adenocarcinoma encasing the gastroduodenal artery obstructing the biliary and pancreatic ducts    Assessment/plan    1. Obstructive jaundice  - CT and MRCP reviewed  - Pancreatic mass obstruction the biliary and pancreatic ducts  -  Status post ERCP/EUS with biliary stent placement  - LFTS downtrending  - Spoke with GI ok to discharge home and resume pradaxa   - CT chest and head negative for metastasis   - FOllow up with surgical oncology and GI for biopsy results and further recommendations     2. Prothrombin gene mutation:  - Pradaxa     3. Chronic atrial fibrillation  - Pradaxa     4. Hypertension  - On Norvasc, Bystolic and losartan     5. Hypokalemia  - Supplement KCL     6. Gastritis on EGD  - PPI OD     Medically stable for discharge home

## 2023-03-15 NOTE — PROGRESS NOTE ADULT - ASSESSMENT
Patient is a 62ym pmhx HTN, Afib, and prothrombin deficiency admitted for painless jaundice and found to have an obstructing pancreatic head mass. EUS shows esophageal candidiasis, gastritis, pancreatic mass, dilated bile duct and pancreatic duct. ERCP biliary stent placed.     PLAN:    - Patient to be discussed at tumor board   - F/U EUS/ERCP  - Surgical plan pending discussions  - Will follow along  - Plan discussed with Attending, Dr. Roque

## 2023-03-15 NOTE — PROGRESS NOTE ADULT - PROBLEM SELECTOR PLAN 1
Now s/p EUS/ ERCP with bilary stent placement yesterday Shows obstructing pancreatic head mass. EUS shows esophageal candidiasis, gastritis, pancreatic mass, dilated bile duct and pancreatic duct.   Tolerating diet. LAES trending down  Can resume Pradaxa today  Advance diet as tolerated.   Patient can be discharged home from GI standpoint.   Follow up with Dr. Tillman as outpatient for repeat ERCP/ Stent removal  Surgery was consulted,
Possibly secondary to pancreatic mass w/ transaminitis and elevated bilirubin. Patient with hx of biliary pancreatitis, s/p ERCP with stone and sludge removal and cholecystectomy in 2019.  MRCP on 3/11/23: 4.0 cm ill-defined round hypo-enhancing pancreatic head mass consistent with pancreatic adenocarcinoma. The mass encases the gastroduodenal artery. Double duct sign. The pancreatic head mass obstructs the biliary and pancreatic ducts with associated moderate intrahepatic and extrahepatic biliary ductal dilatation    Plan:  - Pending CA 19-9 tumor marker   - Will plan for possible EUS/ERCP with stent placement tomorrow   - hold Pradaxa (Last dose 3/10 @ 0800 as per pt)  - Diet as ordered. NPO after midnight  - Maintain current COVID PCR, T&S, INR <1.5, Hgb >7.0, Plt >50 prior to procedure.  - Continue to trend CBC, LFTs   - Avoid Hepatotoxic drugs   - Rest of care as per primary team.
Possibly secondary to pancreatic mass w/ transaminitis and elevated bilirubin. Patient with hx of biliary pancreatitis, s/p ERCP with stone and sludge removal and cholecystectomy in 2019.  MRCP on 3/11/23: 4.0 cm ill-defined round hypo-enhancing pancreatic head mass consistent with pancreatic adenocarcinoma. The mass encases the gastroduodenal artery. Double duct sign. The pancreatic head mass obstructs the biliary and pancreatic ducts with associated moderate intrahepatic and extrahepatic biliary ductal dilatation  ERCP postponed until tomorrow as pt requesting Dr. Tillman to performed procedure. Hemoglobin stable, Tbili trending up at 15.9 today. Alk phos 803, AST//732. CA 19-9 elevated at 669.    Plan:  - Will plan for possible EUS/ERCP with stent placement tomorrow   - hold Pradaxa (Last dose 3/10 @ 0800 as per pt)  - Diet as ordered. NPO after midnight  - Maintain current COVID PCR, T&S, INR <1.5, Hgb >7.0, Plt >50 prior to procedure.  - Continue to trend CBC, LFTs   - Avoid Hepatotoxic drugs   - Rest of care as per primary team.

## 2023-03-15 NOTE — DISCHARGE NOTE PROVIDER - CARE PROVIDERS DIRECT ADDRESSES
[email or web address]

## 2023-03-15 NOTE — DISCHARGE NOTE PROVIDER - HOSPITAL COURSE
61 y/o male with obstructive jaundice, possible pancreatic head mass, Hx of HTN, Afib, Prothrombin gene mutation who presented to the ER with RUQ and jaundice. In the ER noted to have obstructive jaundice with elevated bilirubin and transaminitis. CT of the abdomen and pelvis consistent with pancreatic head mass. Status post MRCP 4.0cm ill defined hypo enhancing pancreatic head mass consistent with pancreatic adenocarcinoma encasing the gastroduodenal artery obstructing the biliary and pancreatic ducts. GI and Surgery consulted. CT chest and head negative for metastasis. GI performed. EUS/ERCP -     61 y/o male with obstructive jaundice, possible pancreatic head mass, Hx of HTN, Afib, Prothrombin gene mutation who presented to the ER with RUQ and jaundice. In the ER noted to have obstructive jaundice with elevated bilirubin and transaminitis. CT of the abdomen and pelvis consistent with pancreatic head mass. Status post MRCP 4.0cm ill defined hypo enhancing pancreatic head mass consistent with pancreatic adenocarcinoma encasing the gastroduodenal artery obstructing the biliary and pancreatic ducts. GI and Surgery consulted. CT chest and head negative for metastasis. GI performed. EUS/ERCP with bilary stent placement and gastritis. Started on PPI. Follow up LFTS downtrending. Spoke with GI cleared for discharge for outpatient follow up with Dr Tillman and Dr Roque

## 2023-03-15 NOTE — PROGRESS NOTE ADULT - SUBJECTIVE AND OBJECTIVE BOX
Subjective: Patient seen and examined at bedside. QUIRINO WEINSTEIN. Denies abdominal pain/n/v/d/cp/sob      MEDICATIONS  (STANDING):  amLODIPine   Tablet 2.5 milliGRAM(s) Oral daily  losartan 100 milliGRAM(s) Oral daily  melatonin 5 milliGRAM(s) Oral at bedtime  nebivolol 10 milliGRAM(s) Oral daily    MEDICATIONS  (PRN):  acetaminophen     Tablet .. 650 milliGRAM(s) Oral every 6 hours PRN Temp greater or equal to 38C (100.4F), Mild Pain (1 - 3)  ALPRAZolam 0.25 milliGRAM(s) Oral daily PRN for anxiety  aluminum hydroxide/magnesium hydroxide/simethicone Suspension 30 milliLiter(s) Oral every 4 hours PRN Dyspepsia  hydrALAZINE Injectable 5 milliGRAM(s) IV Push every 6 hours PRN for sbp>160  ondansetron Injectable 4 milliGRAM(s) IV Push every 8 hours PRN Nausea and/or Vomiting      latex (Rash)  No Known Drug Allergies        Objective:     ICU Vital Signs Last 24 Hrs  T(C): 37.2 (14 Mar 2023 18:05), Max: 37.2 (14 Mar 2023 18:05)  T(F): 98.9 (14 Mar 2023 18:05), Max: 98.9 (14 Mar 2023 18:05)  HR: 64 (14 Mar 2023 18:05) (55 - 66)  BP: 168/86 (14 Mar 2023 19:33) (168/86 - 178/99)  BP(mean): 115 (14 Mar 2023 18:05) (115 - 115)  ABP: --  ABP(mean): --  RR: 18 (14 Mar 2023 10:57) (18 - 18)  SpO2: 93% (14 Mar 2023 10:57) (93% - 96%)    O2 Parameters below as of 14 Mar 2023 10:57  Patient On (Oxygen Delivery Method): room air            I&O's Detail    13 Mar 2023 07:01  -  14 Mar 2023 07:00  --------------------------------------------------------  IN:  Total IN: 0 mL    OUT:    Voided (mL): 1 mL  Total OUT: 1 mL    Total NET: -1 mL          Physical Exam:  General: NAD. Lying comfortably in bed.   HEENT: NCAT. Neck supple. EOMI.   Respiratory: Non labored breathing.   Cardio: RRR  Abdomen: Soft, non-tender, distended. No guarding or rebound.   Extremities: No clubbing or cyanosis.   Neuro: A&O x 3. CNs II-XII grossly intact.                           15.7   9.42  )-----------( 203      ( 13 Mar 2023 04:53 )             45.1       03-14    140  |  106  |  10.4  ----------------------------<  100<H>  3.2<L>   |  23.0  |  <0.20<L>    Ca    8.7      14 Mar 2023 10:25  Mg     1.9     03-14    TPro  5.8<L>  /  Alb  3.4  /  TBili  19.8<H>  /  DBili  x   /  AST  479<H>  /  ALT  788<H>  /  AlkPhos  886<H>  03-14      LIVER FUNCTIONS - ( 14 Mar 2023 10:25 )  Alb: 3.4 g/dL / Pro: 5.8 g/dL / ALK PHOS: 886 U/L / ALT: 788 U/L / AST: 479 U/L / GGT: x

## 2023-03-15 NOTE — DISCHARGE NOTE PROVIDER - PROVIDER TOKENS
PROVIDER:[TOKEN:[[id number]]] PROVIDER:[TOKEN:[[id number]],FOLLOWUP:[1 week]],PROVIDER:[TOKEN:[[id number]],FOLLOWUP:[1 week]]

## 2023-03-15 NOTE — PROGRESS NOTE ADULT - PROVIDER SPECIALTY LIST ADULT
Gastroenterology
Hospitalist
Surgery
Gastroenterology
Gastroenterology

## 2023-03-15 NOTE — PROGRESS NOTE ADULT - REASON FOR ADMISSION
Obstructive Jaundice  r/o pancreatic mass

## 2023-03-16 VITALS
TEMPERATURE: 98 F | RESPIRATION RATE: 18 BRPM | SYSTOLIC BLOOD PRESSURE: 121 MMHG | DIASTOLIC BLOOD PRESSURE: 78 MMHG | HEART RATE: 67 BPM | OXYGEN SATURATION: 97 %

## 2023-03-16 PROBLEM — D68.2 HEREDITARY DEFICIENCY OF OTHER CLOTTING FACTORS: Chronic | Status: ACTIVE | Noted: 2023-03-11

## 2023-03-17 LAB
NON-GYNECOLOGICAL CYTOLOGY STUDY: SIGNIFICANT CHANGE UP
SURGICAL PATHOLOGY STUDY: SIGNIFICANT CHANGE UP

## 2023-03-18 ENCOUNTER — TRANSCRIPTION ENCOUNTER (OUTPATIENT)
Age: 63
End: 2023-03-18

## 2023-03-21 ENCOUNTER — NON-APPOINTMENT (OUTPATIENT)
Age: 63
End: 2023-03-21

## 2023-03-21 PROBLEM — C25.9 ADENOCARCINOMA OF PANCREAS: Status: ACTIVE | Noted: 2023-03-20

## 2023-03-23 ENCOUNTER — APPOINTMENT (OUTPATIENT)
Dept: SURGICAL ONCOLOGY | Facility: CLINIC | Age: 63
End: 2023-03-23
Payer: COMMERCIAL

## 2023-03-23 ENCOUNTER — NON-APPOINTMENT (OUTPATIENT)
Age: 63
End: 2023-03-23

## 2023-03-23 VITALS
HEIGHT: 72 IN | OXYGEN SATURATION: 98 % | BODY MASS INDEX: 29.96 KG/M2 | HEART RATE: 65 BPM | SYSTOLIC BLOOD PRESSURE: 157 MMHG | WEIGHT: 221.19 LBS | TEMPERATURE: 98.3 F | DIASTOLIC BLOOD PRESSURE: 103 MMHG

## 2023-03-23 DIAGNOSIS — C25.9 MALIGNANT NEOPLASM OF PANCREAS, UNSPECIFIED: ICD-10-CM

## 2023-03-23 PROCEDURE — 99244 OFF/OP CNSLTJ NEW/EST MOD 40: CPT

## 2023-03-23 NOTE — HISTORY OF PRESENT ILLNESS
[Jaundice] : jaundice [ERCP] : ERCP [Endostent] : endostent [EUS] : EUS [Biopsy] : biopsy performed [Before Surgery] : before surgery [Not staged outside] : not staged outside [Nutrition] : Nutrition [Social Work] : Social Work [Fully active, able to carry on all pre-disease performance without restriction] : Status 0 - Fully active, able to carry on all pre-disease performance without restriction [de-identified] : This is a non-billable note*\par \par \par Mr. CARLOS APONTE is a 62 year old male who presents for evaluation in our Pancreas Multidisciplinary Clinic.  PMH includes HTN, Afib, Prothrombin Gene Mutation, Prior VTE on Pradaxa.   Active smoker 15 cigarette/day for 30 years. \par Presented to [facility] 3/10/2023 with jaundice, dark urine and acholic stools followed by jaundice appearance a few days later. CT Abdomen & Pelvis w/ IV Contrast on 3/10/2023 showing biliary duct blockage and pancreatic duct blockage as well as a mass on the head of the pancreas.  MRCP w/wo IV Contrast on 3/11/2023 noting a 4.0 x 3.8 cm ill defined mass on the head of the pancreas.    \par He underwent ERCP and EUS on 3/14/2023 noting bile duct stricture s/p metal stent placement. EUS noting pancreatic duct measured 5.7 mm in the neck of the pancreas. There was 34.5 mm x 25.4 mm hypoechoic pancreatic head mass. It was in very close proximity to the portal vein. 3 FNA passes.   Pathology PENDING RESULTS \par \par Family cancer hx:   Sister- breast ca,  sister- kidney cancer \par ECO \par Independent, self employed/ works full time. \par Denies issues with eating, weight is stable, no n/v or changes in bowel habits.  Had some discomfort after ERCP but improved since.  Symptoms of jaundice improving.  \par \par 3/13/23  Ca 19-9  669 \par 3/15/23      ALT  734      Tbili of 13.6 [TextBox_4] : 3/10/23  [TextBox_41] : PENDING [TextBox_43] : 3/14/23  [Pancreatic ductal adenocarcinoma] : Pancreatic ductal adenocarcinoma [Borderline] : borderline [Less than 180 (abutment)] : less than 180 (abutment) [neck] : neck [Chemotherapy] : chemotherapy [Curative (aimed at resection)] : curative (aimed at resection) [TextBox_5] : 3/21/23  [TextBox_38] : 070 [FreeTextEntry4] : 13.6  [TextBox_40] : 3/10/23  [TextBox_56] : 40 [TextBox_74] :  Imaging reviewed: Large pancreas neck lesion, dilated duct upstream , <180 PV/SMV, vein a little narrowed/distorte.  No mets. \par Pathology reviewed: positive for malignant cells, adenocarcinoma   (new results for patient) \par  [FreeTextEntry6] : Neoadjuvant chemo/  Re-review in 2 months\par Trend LFTs, repeat tumor markers \par Pt has appt with med/onc Dr Maier next week.   Will also go to [facility] for 2nd opinion. \par

## 2023-03-24 ENCOUNTER — OUTPATIENT (OUTPATIENT)
Dept: OUTPATIENT SERVICES | Facility: HOSPITAL | Age: 63
LOS: 1 days | Discharge: HOME | End: 2023-03-24

## 2023-03-24 DIAGNOSIS — K86.9 DISEASE OF PANCREAS, UNSPECIFIED: ICD-10-CM

## 2023-03-25 LAB
ALBUMIN SERPL ELPH-MCNC: 3.9 G/DL
ALP BLD-CCNC: 360 U/L
ALT SERPL-CCNC: 77 U/L
ANION GAP SERPL CALC-SCNC: 11 MMOL/L
AST SERPL-CCNC: 25 U/L
BASOPHILS # BLD AUTO: 0.1 K/UL
BASOPHILS NFR BLD AUTO: 1.1 %
BILIRUB DIRECT SERPL-MCNC: 1.7 MG/DL
BILIRUB SERPL-MCNC: 2.8 MG/DL
BUN SERPL-MCNC: 12 MG/DL
CALCIUM SERPL-MCNC: 9.7 MG/DL
CHLORIDE SERPL-SCNC: 102 MMOL/L
CO2 SERPL-SCNC: 30 MMOL/L
CREAT SERPL-MCNC: 0.79 MG/DL
EGFR: 102 ML/MIN/1.73M2
EOSINOPHIL # BLD AUTO: 0.35 K/UL
EOSINOPHIL NFR BLD AUTO: 3.9 %
GLUCOSE SERPL-MCNC: 105 MG/DL
HCT VFR BLD CALC: 44.8 %
HGB BLD-MCNC: 14.8 G/DL
IMM GRANULOCYTES NFR BLD AUTO: 0.4 %
LYMPHOCYTES # BLD AUTO: 2.19 K/UL
LYMPHOCYTES NFR BLD AUTO: 24.1 %
MAN DIFF?: NORMAL
MCHC RBC-ENTMCNC: 30 PG
MCHC RBC-ENTMCNC: 33 GM/DL
MCV RBC AUTO: 90.9 FL
MONOCYTES # BLD AUTO: 0.61 K/UL
MONOCYTES NFR BLD AUTO: 6.7 %
NEUTROPHILS # BLD AUTO: 5.8 K/UL
NEUTROPHILS NFR BLD AUTO: 63.8 %
PLATELET # BLD AUTO: 345 K/UL
POTASSIUM SERPL-SCNC: 3.7 MMOL/L
PROT SERPL-MCNC: 6 G/DL
RBC # BLD: 4.93 M/UL
RBC # FLD: 13.8 %
SODIUM SERPL-SCNC: 143 MMOL/L
WBC # FLD AUTO: 9.09 K/UL

## 2023-03-26 NOTE — CONSULT LETTER
[Dear  ___] : Dear  [unfilled], [Please see my note below.] : Please see my note below. [Consult Letter:] : I had the pleasure of evaluating your patient, [unfilled]. [Consult Closing:] : Thank you very much for allowing me to participate in the care of this patient.  If you have any questions, please do not hesitate to contact me. [Sincerely,] : Sincerely, [FreeTextEntry3] : Jose Roque MD, MPH, FACS, FSSO\par , [facility]\par [facility]\par Associate Professor of Surgery\par [employer] at [facility]

## 2023-03-26 NOTE — PHYSICAL EXAM
[Normal] : supple, no neck mass and thyroid not enlarged [Normal Neck Lymph Nodes] : normal neck lymph nodes  [Normal Supraclavicular Lymph Nodes] : normal supraclavicular lymph nodes [Normal Groin Lymph Nodes] : normal groin lymph nodes [Normal Axillary Lymph Nodes] : normal axillary lymph nodes [Normal] : oriented to person, place and time, with appropriate affect [de-identified] : soft NT ND

## 2023-03-26 NOTE — ASSESSMENT
[FreeTextEntry1] : 62 year old man with pancreatic adenocarcinoma with significant abutment and distortion of the PV/SMV and encasement of GDA takeoff from common hepatic artery, discussed at [facility] and group consensus that this is borderline resectable at best\par Discussed neoadjuvant chemotherapy with tracking of imaging and tumor markers at 2 months and 4 months.\par Given his performance status he will benefit from and tolerate FOLFIRINOX\par \par Plan:\par 1) Getting [place] [facility] [place]\par 2) Neoadjuvant chemo followed by imaging at 2 months\par 3) RTO after the next imaging\par 4) Repeat Ca19-9 now that his bilirubin is in a more normal range\par 5) Medical oncology

## 2023-03-26 NOTE — HISTORY OF PRESENT ILLNESS
[de-identified] : 62 year old male who presents for an initial consultation and evaluation in our Pancreas Multidisciplinary Clinic. \par \par His PMH includes HTN, Afib, Prothrombin Gene Mutation, Prior VTE on Pradaxa. Active smoker 15 cigarette/day for 30 years. \par \par He presented to [facility] 3/10/2023 with dark urine and acholic stools followed by jaundice appearance a few days later. CT Abdomen & Pelvis w/ IV Contrast on 3/10/2023 showing biliary duct blockage and pancreatic duct blockage as well as a mass on the head of the pancreas. \par \par MRCP w/wo IV Contrast on 3/11/2023 noting a 4.0 x 3.8 cm ill defined mass on the head of the pancreas. \par \par He underwent ERCP and EUS on 3/14/2023 noting bile duct stricture s/p metal stent placement. EUS noting pancreatic duct measured 5.7 mm in the neck of the pancreas. There was 34.5 mm x 25.4 mm hypoechoic pancreatic head mass. It was in very close proximity to the portal vein. 3 FNA passes. Pathology PENDING RESULTS \par \par Family cancer hx: Sister- breast ca, sister- kidney cancer \par ECO \par Independent, self employed/ works full time. \par Denies issues with eating, weight is stable, no n/v or changes in bowel habits. Had some discomfort after ERCP but improved since. Symptoms of jaundice improving. \par \par 3/13/23 Ca 19-9 669 \par 3/15/23    Tbili of 13.6.

## 2023-03-29 ENCOUNTER — APPOINTMENT (OUTPATIENT)
Dept: HEMATOLOGY ONCOLOGY | Facility: CLINIC | Age: 63
End: 2023-03-29

## 2023-03-29 NOTE — ADDENDUM
[FreeTextEntry1] : Documented by Sonam Cummings acting as scribe for Dr. Maier on 03/29/2023 \par \par All Medical record entries made by the Scribe were at my, Dr. Maier's, direction and personally dictated by me on 03/29/2023 . I have reviewed the chart and agree that the record accurately reflects my personal performance of the history, physical exam, assessment and plan. I have also personally directed, reviewed, and agreed with the discharge instructions.\par

## 2023-03-29 NOTE — HISTORY OF PRESENT ILLNESS
[de-identified] : 62 year old M (current smoker 45PY) with PMHX HTN, Afib, Prothrombin Gene Mutation, Prior VTE on Pradaxa presents for initial evaluation for pancreatic mass referred by Dr. Roque.\par \par He presented to [facility] 3/10/2023 with dark urine and acholic stools followed by jaundice appearance a few days later. CT Abdomen & Pelvis w/ IV Contrast on 3/10/2023 showing biliary duct blockage and pancreatic duct blockage as well as a mass on the head of the pancreas. \par \par MRCP w/wo IV Contrast on 3/11/2023 noting a 4.0 x 3.8 cm ill defined mass on the head of the pancreas. \par \par He underwent ERCP and EUS on 3/14/2023 noting bile duct stricture s/p metal stent placement. EUS noting pancreatic duct measured 5.7 mm in the neck of the pancreas. There was 34.5 mm x 25.4 mm hypoechoic pancreatic head mass. Distal esophagus biopsy showed squamous esophageal mucosa with mild reactive changes, suggestive of reflux. Columnar / glandular epithelium is not present. Negative for dysplasia. Reactive gastric biopsy negative for H.Pylori\par \par 3/13/23 Ca 19-9 669 \par 3/15/23    Tbili of 13.6. \par 3/21/23: Direct Bilirubin 1.7 \par \par Family cancer hx: Sister- breast ca, sister- kidney cancer \par \par \par Patient presents for an initial consultation\par

## 2023-03-29 NOTE — ASSESSMENT
[FreeTextEntry1] : 62 year old M (current smoker 45PY) with PMHX HTN, Afib, Prothrombin Gene Mutation, Prior VTE on Pradaxa presents for initial evaluation for pancreatic mass referred by Dr. Roque.\par

## 2023-04-20 ENCOUNTER — APPOINTMENT (OUTPATIENT)
Dept: GASTROENTEROLOGY | Facility: CLINIC | Age: 63
End: 2023-04-20

## 2023-08-29 ENCOUNTER — OUTPATIENT (OUTPATIENT)
Dept: OUTPATIENT SERVICES | Facility: HOSPITAL | Age: 63
LOS: 1 days | End: 2023-08-29
Payer: COMMERCIAL

## 2023-08-29 DIAGNOSIS — I48.20 CHRONIC ATRIAL FIBRILLATION, UNSPECIFIED: ICD-10-CM

## 2023-08-29 DIAGNOSIS — Z01.810 ENCOUNTER FOR PREPROCEDURAL CARDIOVASCULAR EXAMINATION: ICD-10-CM

## 2023-08-29 DIAGNOSIS — Z01.818 ENCOUNTER FOR OTHER PREPROCEDURAL EXAMINATION: ICD-10-CM

## 2023-08-29 PROCEDURE — 93018 CV STRESS TEST I&R ONLY: CPT

## 2023-08-29 PROCEDURE — 78452 HT MUSCLE IMAGE SPECT MULT: CPT

## 2023-08-29 PROCEDURE — 93016 CV STRESS TEST SUPVJ ONLY: CPT

## 2023-08-29 PROCEDURE — 78452 HT MUSCLE IMAGE SPECT MULT: CPT | Mod: 26

## 2023-08-29 PROCEDURE — A9500: CPT

## 2023-08-29 PROCEDURE — 93017 CV STRESS TEST TRACING ONLY: CPT

## 2023-10-17 ENCOUNTER — NON-APPOINTMENT (OUTPATIENT)
Age: 63
End: 2023-10-17

## 2024-03-14 NOTE — CDI QUERY NOTE - NSCDINOTECODERS_GEN_A_CORE
Cardiology Clinic Note:    PCP: Candida Slaughter FNP    REFERRING PHYSICIAN: Candida Slaughter FNP    CHIEF COMPLAINT:   Chief Complaint   Patient presents with    Follow-up     Bilateral lower extremity edema. No other symptoms    Edema     Bilateral lower extremity.         HISTORY OF PRESENT ILLNESS:  Purnima Cardona is a 89 y.o. female who presents for evaluation of hypertension, shortness of breath, lower extremity swelling.      Pt reports was less active, moved to assisted living, now has started to ambulate more frequently. Her daughter notes she is much more socially interactive, shortness of breath with activity has resolved. . She reports chest pain has resolved.  She  is monitoring blood pressure has been well controlled.  She notes mild bilateral peripheral has improved, back to one Lasix daily.  She continues to experience bilat lower extremity swelling, is not compliant with sodium restriction or compression stockings. Patient denies dizziness.  Denies chest pain, pressure, tightness or squeezing.      Review of Systems   Constitutional: Negative for diaphoresis, malaise/fatigue, night sweats and weight gain.   HENT:  Negative for congestion, ear pain, hearing loss, nosebleeds and sore throat.    Eyes:  Negative for blurred vision, double vision, pain, photophobia and visual disturbance.   Cardiovascular:  Negative for chest pain, claudication, dyspnea on exertion, irregular heartbeat, leg swelling, near-syncope, orthopnea, palpitations and syncope.   Respiratory:  Negative for cough, shortness of breath, sleep disturbances due to breathing, snoring and wheezing.    Endocrine: Negative for cold intolerance, heat intolerance, polydipsia, polyphagia and polyuria.   Hematologic/Lymphatic: Negative for bleeding problem. Does not bruise/bleed easily.   Skin:  Negative for dry skin, flushing, itching, rash and skin cancer.   Musculoskeletal:  Negative for arthritis, back pain, falls, joint pain,  muscle cramps, muscle weakness and myalgias.   Gastrointestinal:  Negative for abdominal pain, change in bowel habit, constipation, diarrhea, dysphagia, heartburn, nausea and vomiting.   Genitourinary:  Negative for bladder incontinence, dysuria, flank pain, frequency and nocturia.   Neurological:  Negative for dizziness, focal weakness, headaches, light-headedness, loss of balance, numbness, paresthesias and seizures.   Psychiatric/Behavioral:  Negative for depression, memory loss and substance abuse. The patient is not nervous/anxious.    Allergic/Immunologic: Negative for environmental allergies.          PAST MEDICAL HISTORY:  Past Medical History:   Diagnosis Date    Abnormal EKG     Essential hypertension     Hyperlipidemia     Shortness of breath        PAST SURGICAL HISTORY:  Past Surgical History:   Procedure Laterality Date    CHOLECYSTECTOMY      EYE SURGERY      cataract removal    HYSTERECTOMY         SOCIAL HISTORY:  Social History     Socioeconomic History    Marital status:    Tobacco Use    Smoking status: Never     Passive exposure: Never    Smokeless tobacco: Never   Substance and Sexual Activity    Alcohol use: Never    Drug use: Never    Sexual activity: Not Currently     Social Determinants of Health     Financial Resource Strain: Low Risk  (3/7/2024)    Overall Financial Resource Strain (CARDIA)     Difficulty of Paying Living Expenses: Not hard at all   Food Insecurity: No Food Insecurity (3/7/2024)    Hunger Vital Sign     Worried About Running Out of Food in the Last Year: Never true     Ran Out of Food in the Last Year: Never true   Transportation Needs: No Transportation Needs (3/7/2024)    PRAPARE - Transportation     Lack of Transportation (Medical): No     Lack of Transportation (Non-Medical): No   Physical Activity: Insufficiently Active (3/7/2024)    Exercise Vital Sign     Days of Exercise per Week: 2 days     Minutes of Exercise per Session: 20 min   Stress: Stress Concern  Present (3/7/2024)    [facility] [employer]ational [facility] - Occupational Stress Questionnaire     Feeling of Stress : To some extent   Social Connections: Moderately Isolated (3/7/2024)    Social Connection and Isolation Panel [NHANES]     Frequency of Communication with Friends and Family: More than three times a week     Frequency of Social Gatherings with Friends and Family: Once a week     Attends Orthodoxy Services: Never     Active Member of Clubs or Organizations: Yes     Attends Club or Organization Meetings: Never     Marital Status:    Housing Stability: Low Risk  (3/7/2024)    Housing Stability Vital Sign     Unable to Pay for Housing in the Last Year: No     Number of Places Lived in the Last Year: 1     Unstable Housing in the Last Year: No       FAMILY HISTORY:  Family History   Problem Relation Age of Onset    Breast cancer Mother     Stroke Father        ALLERGIES:  Allergies as of 03/14/2024 - Reviewed 03/14/2024   Allergen Reaction Noted    Clonidine  03/19/2021    Hiprex [methenamine hippurate]  03/19/2021    Sulfa (sulfonamide antibiotics)  03/19/2021         MEDICATIONS:  Current Outpatient Medications on File Prior to Visit   Medication Sig Dispense Refill    acetaminophen (TYLENOL) 500 MG tablet Take 500 mg by mouth every 6 (six) hours as needed for Pain.      calcium carbonate (OS-NASREEN) 600 mg calcium (1,500 mg) Tab Take 600 mg by mouth once daily. Take 1 tablet po BID      carvediloL (COREG) 3.125 MG tablet Take 1 tablet (3.125 mg total) by mouth 2 (two) times daily with meals. 60 tablet 11    furosemide (LASIX) 20 MG tablet Take 1 tablet (20 mg total) by mouth once daily. 90 tablet 3    lisinopriL (PRINIVIL,ZESTRIL) 40 MG tablet Take 1 tablet (40 mg total) by mouth once daily. 90 tablet 3    metOLazone (ZAROXOLYN) 5 MG tablet Take 1 tablet (5 mg total) by mouth once daily. for 3 doses 3 tablet 0    multivit-min/ferrous fumarate (MULTI VITAMIN ORAL) Take by mouth.       pantoprazole (PROTONIX) 40 MG tablet Take 1 tablet (40 mg total) by mouth once daily. 90 tablet 4    potassium chloride (KLOR-CON) 8 MEQ TbSR Take 1 tablet (8 mEq total) by mouth once daily. for 360 doses 90 tablet 3     Current Facility-Administered Medications on File Prior to Visit   Medication Dose Route Frequency Provider Last Rate Last Admin    [DISCONTINUED] metOLazone tablet 5 mg  5 mg Oral Daily Jose Dowell, DO              PHYSICAL EXAM:  Blood pressure (!) 150/92, pulse 105, height 5' (1.524 m), weight 67.1 kg (148 lb), SpO2 97 %.  Wt Readings from Last 3 Encounters:   03/14/24 67.1 kg (148 lb)   03/07/24 61.7 kg (136 lb)   02/12/24 67.7 kg (149 lb 3.2 oz)      Body mass index is 28.9 kg/m².    Physical Exam  Vitals and nursing note reviewed.   Constitutional:       Appearance: Normal appearance. She is normal weight.   HENT:      Head: Normocephalic and atraumatic.      Right Ear: External ear normal.      Left Ear: External ear normal.   Eyes:      General: No scleral icterus.        Right eye: No discharge.         Left eye: No discharge.      Extraocular Movements: Extraocular movements intact.      Conjunctiva/sclera: Conjunctivae normal.      Pupils: Pupils are equal, round, and reactive to light.   Cardiovascular:      Rate and Rhythm: Normal rate and regular rhythm.      Pulses: Normal pulses.      Heart sounds: Normal heart sounds. No murmur heard.     No friction rub. No gallop.   Pulmonary:      Effort: Pulmonary effort is normal.      Breath sounds: Normal breath sounds. No wheezing, rhonchi or rales.   Chest:      Chest wall: No tenderness.   Abdominal:      General: Abdomen is flat. Bowel sounds are normal. There is no distension.      Palpations: Abdomen is soft.      Tenderness: There is no abdominal tenderness. There is no guarding or rebound.   Musculoskeletal:         General: No swelling or tenderness. Normal range of motion.      Cervical back: Normal range of motion and neck  supple.      Right lower leg: Edema present.      Left lower leg: Edema present.   Skin:     General: Skin is warm and dry.      Findings: No erythema or rash.   Neurological:      General: No focal deficit present.      Mental Status: She is alert and oriented to person, place, and time.      Cranial Nerves: No cranial nerve deficit.      Motor: No weakness.      Gait: Gait normal.   Psychiatric:         Mood and Affect: Mood normal.         Behavior: Behavior normal.         Thought Content: Thought content normal.         Judgment: Judgment normal.          LABS REVIEWED:  Lab Results   Component Value Date    WBC 8.19 10/16/2023    RBC 3.13 (L) 10/16/2023    HGB 8.7 (L) 10/16/2023    HCT 25.8 (L) 10/16/2023    MCV 91.1 10/16/2023    MCH 30.4 10/16/2023    MCHC 33.3 10/16/2023    RDW 14.3 10/16/2023     10/16/2023    MPV 11.2 10/16/2023    NRBC 0.0 10/16/2023    INR 1.09 10/14/2023     Lab Results   Component Value Date     03/07/2024    K 4.1 03/07/2024     03/07/2024    CO2 33 (H) 03/07/2024    BUN 20 (H) 03/07/2024    MG 2.5 (H) 10/27/2022     Lab Results   Component Value Date    AST 17 10/14/2023    ALT 20 10/14/2023     Lab Results   Component Value Date     (H) 03/07/2024    HGBA1C 6.4 03/08/2024     Lab Results   Component Value Date    CHOL 143 03/07/2024    HDL 61 (H) 03/07/2024    TRIG 165 (H) 03/07/2024    CHOLHDL 2.3 03/07/2024       CARDIAC STUDIES REVIEWED:    OTHER IMAGING STUDIES REVIEWED:        ASSESSMENT:   Essential hypertension    Premature atrial contractions    Lower extremity edema            PLAN:  1.  Hypertension,  adequate control off amlodipine,  tolerating Coreg 3.125 mg  bid, requests resume amlodipine, will start back with 5 mg qd.   2.  Lower extremity edema, has reoccurred, taking lasix daily, add Zaroxyln 5 mg q d x 3, then one Monday and Thursday, continue current lasix marrero dose.  3. Orthostatic symptoms have resolved.  4. Shortness of breath with  exertion has resolved, encourage ambulation, goal walking 10 mins AM and PM with walker    Follow up in 3 months,  sooner if symptoms change         CDI Specialist

## 2024-11-10 ENCOUNTER — EMERGENCY (EMERGENCY)
Facility: HOSPITAL | Age: 64
LOS: 1 days | Discharge: HOME | End: 2024-11-10
Attending: STUDENT IN AN ORGANIZED HEALTH CARE EDUCATION/TRAINING PROGRAM
Payer: COMMERCIAL

## 2024-11-10 VITALS
RESPIRATION RATE: 20 BRPM | DIASTOLIC BLOOD PRESSURE: 86 MMHG | TEMPERATURE: 98 F | HEIGHT: 72 IN | WEIGHT: 175.27 LBS | SYSTOLIC BLOOD PRESSURE: 150 MMHG | OXYGEN SATURATION: 98 % | HEART RATE: 72 BPM

## 2024-11-10 LAB
ALBUMIN SERPL ELPH-MCNC: 3.7 G/DL — SIGNIFICANT CHANGE UP (ref 3.3–5.2)
ALP SERPL-CCNC: 159 U/L — HIGH (ref 40–120)
ALT FLD-CCNC: 17 U/L — SIGNIFICANT CHANGE UP
ANION GAP SERPL CALC-SCNC: 11 MMOL/L — SIGNIFICANT CHANGE UP (ref 5–17)
ANISOCYTOSIS BLD QL: SLIGHT — SIGNIFICANT CHANGE UP
APTT BLD: 30.3 SEC — SIGNIFICANT CHANGE UP (ref 24.5–35.6)
AST SERPL-CCNC: 16 U/L — SIGNIFICANT CHANGE UP
BASOPHILS # BLD AUTO: 0 K/UL — SIGNIFICANT CHANGE UP (ref 0–0.2)
BASOPHILS NFR BLD AUTO: 0 % — SIGNIFICANT CHANGE UP (ref 0–2)
BILIRUB SERPL-MCNC: 0.9 MG/DL — SIGNIFICANT CHANGE UP (ref 0.4–2)
BUN SERPL-MCNC: 17.4 MG/DL — SIGNIFICANT CHANGE UP (ref 8–20)
CALCIUM SERPL-MCNC: 8.4 MG/DL — SIGNIFICANT CHANGE UP (ref 8.4–10.5)
CHLORIDE SERPL-SCNC: 102 MMOL/L — SIGNIFICANT CHANGE UP (ref 96–108)
CO2 SERPL-SCNC: 27 MMOL/L — SIGNIFICANT CHANGE UP (ref 22–29)
CREAT SERPL-MCNC: 0.75 MG/DL — SIGNIFICANT CHANGE UP (ref 0.5–1.3)
EGFR: 102 ML/MIN/1.73M2 — SIGNIFICANT CHANGE UP
ELLIPTOCYTES BLD QL SMEAR: SLIGHT — SIGNIFICANT CHANGE UP
EOSINOPHIL # BLD AUTO: 0.5 K/UL — SIGNIFICANT CHANGE UP (ref 0–0.5)
EOSINOPHIL NFR BLD AUTO: 1.7 % — SIGNIFICANT CHANGE UP (ref 0–6)
GIANT PLATELETS BLD QL SMEAR: PRESENT — SIGNIFICANT CHANGE UP
GLUCOSE SERPL-MCNC: 92 MG/DL — SIGNIFICANT CHANGE UP (ref 70–99)
HCT VFR BLD CALC: 32.4 % — LOW (ref 39–50)
HGB BLD-MCNC: 10.5 G/DL — LOW (ref 13–17)
INR BLD: 1.18 RATIO — HIGH (ref 0.85–1.16)
LYMPHOCYTES # BLD AUTO: 0.53 K/UL — LOW (ref 1–3.3)
LYMPHOCYTES # BLD AUTO: 1.8 % — LOW (ref 13–44)
MAGNESIUM SERPL-MCNC: 2 MG/DL — SIGNIFICANT CHANGE UP (ref 1.6–2.6)
MANUAL SMEAR VERIFICATION: SIGNIFICANT CHANGE UP
MCHC RBC-ENTMCNC: 24.6 PG — LOW (ref 27–34)
MCHC RBC-ENTMCNC: 32.4 G/DL — SIGNIFICANT CHANGE UP (ref 32–36)
MCV RBC AUTO: 75.9 FL — LOW (ref 80–100)
METAMYELOCYTES # FLD: 0.9 % — HIGH (ref 0–0)
MICROCYTES BLD QL: SLIGHT — SIGNIFICANT CHANGE UP
MONOCYTES # BLD AUTO: 0 K/UL — SIGNIFICANT CHANGE UP (ref 0–0.9)
MONOCYTES NFR BLD AUTO: 0 % — LOW (ref 2–14)
NEUTROPHILS # BLD AUTO: 28.11 K/UL — HIGH (ref 1.8–7.4)
NEUTROPHILS NFR BLD AUTO: 94.7 % — HIGH (ref 43–77)
NT-PROBNP SERPL-SCNC: 160 PG/ML — SIGNIFICANT CHANGE UP (ref 0–300)
PLAT MORPH BLD: NORMAL — SIGNIFICANT CHANGE UP
PLATELET # BLD AUTO: 94 K/UL — LOW (ref 150–400)
POIKILOCYTOSIS BLD QL AUTO: SIGNIFICANT CHANGE UP
POLYCHROMASIA BLD QL SMEAR: SLIGHT — SIGNIFICANT CHANGE UP
POTASSIUM SERPL-MCNC: 3.8 MMOL/L — SIGNIFICANT CHANGE UP (ref 3.5–5.3)
POTASSIUM SERPL-SCNC: 3.8 MMOL/L — SIGNIFICANT CHANGE UP (ref 3.5–5.3)
PROT SERPL-MCNC: 5.8 G/DL — LOW (ref 6.6–8.7)
PROTHROM AB SERPL-ACNC: 13.3 SEC — SIGNIFICANT CHANGE UP (ref 9.9–13.4)
RBC # BLD: 4.27 M/UL — SIGNIFICANT CHANGE UP (ref 4.2–5.8)
RBC # FLD: 21.4 % — HIGH (ref 10.3–14.5)
RBC BLD AUTO: ABNORMAL
SODIUM SERPL-SCNC: 139 MMOL/L — SIGNIFICANT CHANGE UP (ref 135–145)
TROPONIN T, HIGH SENSITIVITY RESULT: 6 NG/L — SIGNIFICANT CHANGE UP (ref 0–51)
VARIANT LYMPHS # BLD: 0.9 % — SIGNIFICANT CHANGE UP (ref 0–6)
WBC # BLD: 29.68 K/UL — HIGH (ref 3.8–10.5)
WBC # FLD AUTO: 29.68 K/UL — HIGH (ref 3.8–10.5)

## 2024-11-10 PROCEDURE — 93010 ELECTROCARDIOGRAM REPORT: CPT

## 2024-11-10 PROCEDURE — 71045 X-RAY EXAM CHEST 1 VIEW: CPT | Mod: 26

## 2024-11-10 PROCEDURE — 99285 EMERGENCY DEPT VISIT HI MDM: CPT

## 2024-11-10 NOTE — ED PROVIDER NOTE - CLINICAL SUMMARY MEDICAL DECISION MAKING FREE TEXT BOX
64-year-old male; with PMH significant for pancreatic CA (s/p Whipple on 10/2023, s/p JACOB, last [place] Wednesday [place] [facility] [place] HTN, A-fib (on Pradaxa), prior PE, PT gene mutation, s/p JACOB; now presenting with chest pain.  Patient reports over the past 3 days he has been having chest pain–left-sided, pleuritic, worse with deep inspiration, not present at rest, with no associated shortness of breath or palpitations.  Denies fever, chills, sweats.  Denies any cough.  Denies nausea or vomiting.  Denies any diaphoresis.  Denies numbness or tingling.  Patient states this feels like his last PE.  will do labs, ekg, cardiac monitor, CTA r/o PE. offered pain control, but declined at this time. signed out pending CT.

## 2024-11-10 NOTE — ED PROVIDER NOTE - PROGRESS NOTE DETAILS
received s/o pending CT. no PE or pna on CT. pneumobilia is known to this patient with known liver lesions. just had MR liver and CTAP a week ago with [facility]. had chemo last week and neulasta on friday. will start on muscle relaxers. patient and wife comfortable with dc plan.

## 2024-11-10 NOTE — ED PROVIDER NOTE - NSFOLLOWUPINSTRUCTIONS_ED_ALL_ED_FT
Follow up with your oncology team. Take meds as prescribed. Return for any severe pain, difficulty breathing or any other concerning or worsening symptoms

## 2024-11-10 NOTE — ED PROVIDER NOTE - NSTIMEPROVIDERCAREINITIATE_GEN_ER
10-Nov-2024 22:45 Attempt calling pt no answer LVM flory's message and to call us back if theres any questions need it

## 2024-11-10 NOTE — ED PROVIDER NOTE - OBJECTIVE STATEMENT
64-year-old male; with PMH significant for pancreatic CA (s/p Whipple on 10/2023, s/p JACOB, last chemo Wednesday at [facility] [place]), HTN, A-fib (on Pradaxa), prior PE, PT gene mutation, s/p JACOB; now presenting with chest pain.  Patient reports over the past 3 days he has been having chest pain–left-sided, pleuritic, worse with deep inspiration, not present at rest, with no associated shortness of breath or palpitations.  Denies fever, chills, sweats.  Denies any cough.  Denies nausea or vomiting.  Denies any diaphoresis.  Denies numbness or tingling.  Patient states this feels like his last PE.

## 2024-11-10 NOTE — ED PROVIDER NOTE - PATIENT PORTAL LINK FT
You can access the FollowMyHealth Patient Portal offered by [facility] by registering at the following website: [email or web address]. By joining [facility]’s FollowMyHealth portal, you will also be able to view your health information using other applications (apps) compatible with our system.

## 2024-11-10 NOTE — ED ADULT TRIAGE NOTE - CHIEF COMPLAINT QUOTE
Patient presents to ED with c/o difficulty breathing since this AM associated with left lung pain.  Per patient, breathing is shallow panting.   Patient with h/o PE x 2 in the past.  Last PE few years ago.   Currently on Pradexa  No meds PTA.  Patient with h/o pancreatic cancer s/p whipple procedure 10/23 and currently on chemo via [facility].  Patient with right sided chest port.  Last chemo Wednesday.

## 2024-11-11 VITALS
OXYGEN SATURATION: 100 % | RESPIRATION RATE: 16 BRPM | DIASTOLIC BLOOD PRESSURE: 70 MMHG | TEMPERATURE: 98 F | SYSTOLIC BLOOD PRESSURE: 108 MMHG | HEART RATE: 60 BPM

## 2024-11-11 LAB
TROPONIN T, HIGH SENSITIVITY RESULT: 7 NG/L — SIGNIFICANT CHANGE UP (ref 0–51)
TROPONIN T, HIGH SENSITIVITY RESULT: 9 NG/L — SIGNIFICANT CHANGE UP (ref 0–51)

## 2024-11-11 PROCEDURE — 71275 CT ANGIOGRAPHY CHEST: CPT | Mod: MC

## 2024-11-11 PROCEDURE — 83880 ASSAY OF NATRIURETIC PEPTIDE: CPT

## 2024-11-11 PROCEDURE — 96374 THER/PROPH/DIAG INJ IV PUSH: CPT

## 2024-11-11 PROCEDURE — 85730 THROMBOPLASTIN TIME PARTIAL: CPT

## 2024-11-11 PROCEDURE — 99285 EMERGENCY DEPT VISIT HI MDM: CPT | Mod: 25

## 2024-11-11 PROCEDURE — 93005 ELECTROCARDIOGRAM TRACING: CPT

## 2024-11-11 PROCEDURE — 85025 COMPLETE CBC W/AUTO DIFF WBC: CPT

## 2024-11-11 PROCEDURE — 84484 ASSAY OF TROPONIN QUANT: CPT

## 2024-11-11 PROCEDURE — 36415 COLL VENOUS BLD VENIPUNCTURE: CPT

## 2024-11-11 PROCEDURE — 71045 X-RAY EXAM CHEST 1 VIEW: CPT

## 2024-11-11 PROCEDURE — 80053 COMPREHEN METABOLIC PANEL: CPT

## 2024-11-11 PROCEDURE — 83735 ASSAY OF MAGNESIUM: CPT

## 2024-11-11 PROCEDURE — 85610 PROTHROMBIN TIME: CPT

## 2024-11-11 PROCEDURE — 71275 CT ANGIOGRAPHY CHEST: CPT | Mod: 26,MC

## 2024-11-11 RX ORDER — LIDOCAINE HYDROCHLORIDE 40 MG/ML
1 SOLUTION TOPICAL ONCE
Refills: 0 | Status: COMPLETED | OUTPATIENT
Start: 2024-11-11 | End: 2024-11-11

## 2024-11-11 RX ORDER — METHOCARBAMOL 500 MG/1
2 TABLET ORAL
Qty: 40 | Refills: 0
Start: 2024-11-11 | End: 2024-11-20

## 2024-11-11 RX ORDER — KETOROLAC TROMETHAMINE 30 MG/ML
15 INJECTION INTRAMUSCULAR; INTRAVENOUS ONCE
Refills: 0 | Status: DISCONTINUED | OUTPATIENT
Start: 2024-11-11 | End: 2024-11-11

## 2024-11-11 RX ORDER — METHOCARBAMOL 500 MG/1
1500 TABLET ORAL ONCE
Refills: 0 | Status: COMPLETED | OUTPATIENT
Start: 2024-11-11 | End: 2024-11-11

## 2024-11-11 RX ORDER — OXYCODONE HYDROCHLORIDE 30 MG/1
1 TABLET ORAL
Qty: 6 | Refills: 0
Start: 2024-11-11 | End: 2024-11-12

## 2024-11-11 RX ADMIN — LIDOCAINE HYDROCHLORIDE 1 PATCH: 40 SOLUTION TOPICAL at 02:49

## 2024-11-11 RX ADMIN — KETOROLAC TROMETHAMINE 15 MILLIGRAM(S): 30 INJECTION INTRAMUSCULAR; INTRAVENOUS at 02:49

## 2024-11-11 RX ADMIN — METHOCARBAMOL 1500 MILLIGRAM(S): 500 TABLET ORAL at 02:50

## 2025-01-18 ENCOUNTER — INPATIENT (INPATIENT)
Facility: HOSPITAL | Age: 65
LOS: 5 days | Discharge: HOME | DRG: 377 | End: 2025-01-24
Attending: HOSPITALIST | Admitting: HOSPITALIST
Payer: COMMERCIAL

## 2025-01-18 VITALS
HEART RATE: 81 BPM | WEIGHT: 184.97 LBS | DIASTOLIC BLOOD PRESSURE: 71 MMHG | RESPIRATION RATE: 14 BRPM | SYSTOLIC BLOOD PRESSURE: 81 MMHG

## 2025-01-18 DIAGNOSIS — I48.91 UNSPECIFIED ATRIAL FIBRILLATION: ICD-10-CM

## 2025-01-18 DIAGNOSIS — I10 ESSENTIAL (PRIMARY) HYPERTENSION: ICD-10-CM

## 2025-01-18 DIAGNOSIS — C25.9 MALIGNANT NEOPLASM OF PANCREAS, UNSPECIFIED: ICD-10-CM

## 2025-01-18 DIAGNOSIS — Z90.410 ACQUIRED TOTAL ABSENCE OF PANCREAS: Chronic | ICD-10-CM

## 2025-01-18 DIAGNOSIS — D62 ACUTE POSTHEMORRHAGIC ANEMIA: ICD-10-CM

## 2025-01-18 DIAGNOSIS — K92.2 GASTROINTESTINAL HEMORRHAGE, UNSPECIFIED: ICD-10-CM

## 2025-01-18 DIAGNOSIS — Z98.890 OTHER SPECIFIED POSTPROCEDURAL STATES: Chronic | ICD-10-CM

## 2025-01-18 LAB
ACANTHOCYTES BLD QL SMEAR: SLIGHT — SIGNIFICANT CHANGE UP
ALBUMIN SERPL ELPH-MCNC: 2.9 G/DL — LOW (ref 3.3–5.2)
ALP SERPL-CCNC: 122 U/L — HIGH (ref 40–120)
ALT FLD-CCNC: 36 U/L — SIGNIFICANT CHANGE UP
ANION GAP SERPL CALC-SCNC: 12 MMOL/L — SIGNIFICANT CHANGE UP (ref 5–17)
ANION GAP SERPL CALC-SCNC: 24 MMOL/L — HIGH (ref 5–17)
ANISOCYTOSIS BLD QL: SLIGHT — SIGNIFICANT CHANGE UP
APTT BLD: 38.6 SEC — HIGH (ref 24.5–35.6)
AST SERPL-CCNC: 75 U/L — HIGH
BASOPHILS # BLD AUTO: 0 K/UL — SIGNIFICANT CHANGE UP (ref 0–0.2)
BASOPHILS # BLD AUTO: 0.25 K/UL — HIGH (ref 0–0.2)
BASOPHILS NFR BLD AUTO: 0 % — SIGNIFICANT CHANGE UP (ref 0–2)
BASOPHILS NFR BLD AUTO: 1.7 % — SIGNIFICANT CHANGE UP (ref 0–2)
BILIRUB SERPL-MCNC: 0.7 MG/DL — SIGNIFICANT CHANGE UP (ref 0.4–2)
BLD GP AB SCN SERPL QL: SIGNIFICANT CHANGE UP
BUN SERPL-MCNC: 15.3 MG/DL — SIGNIFICANT CHANGE UP (ref 8–20)
BUN SERPL-MCNC: 20.4 MG/DL — HIGH (ref 8–20)
CALCIUM SERPL-MCNC: 7.6 MG/DL — LOW (ref 8.4–10.5)
CALCIUM SERPL-MCNC: 8.1 MG/DL — LOW (ref 8.4–10.5)
CHLORIDE SERPL-SCNC: 102 MMOL/L — SIGNIFICANT CHANGE UP (ref 96–108)
CHLORIDE SERPL-SCNC: 109 MMOL/L — HIGH (ref 96–108)
CO2 SERPL-SCNC: 15 MMOL/L — LOW (ref 22–29)
CO2 SERPL-SCNC: 22 MMOL/L — SIGNIFICANT CHANGE UP (ref 22–29)
CREAT SERPL-MCNC: 1.07 MG/DL — SIGNIFICANT CHANGE UP (ref 0.5–1.3)
CREAT SERPL-MCNC: 1.1 MG/DL — SIGNIFICANT CHANGE UP (ref 0.5–1.3)
EGFR: 76 ML/MIN/1.73M2 — SIGNIFICANT CHANGE UP
EGFR: 78 ML/MIN/1.73M2 — SIGNIFICANT CHANGE UP
ELLIPTOCYTES BLD QL SMEAR: SIGNIFICANT CHANGE UP
ELLIPTOCYTES BLD QL SMEAR: SLIGHT — SIGNIFICANT CHANGE UP
EOSINOPHIL # BLD AUTO: 0 K/UL — SIGNIFICANT CHANGE UP (ref 0–0.5)
EOSINOPHIL # BLD AUTO: 0.25 K/UL — SIGNIFICANT CHANGE UP (ref 0–0.5)
EOSINOPHIL NFR BLD AUTO: 0 % — SIGNIFICANT CHANGE UP (ref 0–6)
EOSINOPHIL NFR BLD AUTO: 1.7 % — SIGNIFICANT CHANGE UP (ref 0–6)
GIANT PLATELETS BLD QL SMEAR: PRESENT — SIGNIFICANT CHANGE UP
GLUCOSE SERPL-MCNC: 133 MG/DL — HIGH (ref 70–99)
GLUCOSE SERPL-MCNC: 319 MG/DL — HIGH (ref 70–99)
HCT VFR BLD CALC: 28.8 % — LOW (ref 39–50)
HCT VFR BLD CALC: 29.3 % — LOW (ref 39–50)
HCT VFR BLD CALC: 30 % — LOW (ref 39–50)
HGB BLD-MCNC: 8.5 G/DL — LOW (ref 13–17)
HGB BLD-MCNC: 9.5 G/DL — LOW (ref 13–17)
HGB BLD-MCNC: 9.6 G/DL — LOW (ref 13–17)
INR BLD: 1.34 RATIO — HIGH (ref 0.85–1.16)
LACTATE SERPL-SCNC: 2.7 MMOL/L — HIGH (ref 0.5–2)
LYMPHOCYTES # BLD AUTO: 0 % — LOW (ref 13–44)
LYMPHOCYTES # BLD AUTO: 0 K/UL — LOW (ref 1–3.3)
LYMPHOCYTES # BLD AUTO: 18.3 % — SIGNIFICANT CHANGE UP (ref 13–44)
LYMPHOCYTES # BLD AUTO: 2.71 K/UL — SIGNIFICANT CHANGE UP (ref 1–3.3)
MANUAL SMEAR VERIFICATION: SIGNIFICANT CHANGE UP
MANUAL SMEAR VERIFICATION: SIGNIFICANT CHANGE UP
MCHC RBC-ENTMCNC: 24 PG — LOW (ref 27–34)
MCHC RBC-ENTMCNC: 25.5 PG — LOW (ref 27–34)
MCHC RBC-ENTMCNC: 25.5 PG — LOW (ref 27–34)
MCHC RBC-ENTMCNC: 29 G/DL — LOW (ref 32–36)
MCHC RBC-ENTMCNC: 32 G/DL — SIGNIFICANT CHANGE UP (ref 32–36)
MCHC RBC-ENTMCNC: 33 G/DL — SIGNIFICANT CHANGE UP (ref 32–36)
MCV RBC AUTO: 77.2 FL — LOW (ref 80–100)
MCV RBC AUTO: 79.6 FL — LOW (ref 80–100)
MCV RBC AUTO: 82.8 FL — SIGNIFICANT CHANGE UP (ref 80–100)
METAMYELOCYTES # FLD: 0.9 % — HIGH (ref 0–0)
METAMYELOCYTES NFR BLD: 0.9 % — HIGH (ref 0–0)
MICROCYTES BLD QL: SLIGHT — SIGNIFICANT CHANGE UP
MONOCYTES # BLD AUTO: 0.77 K/UL — SIGNIFICANT CHANGE UP (ref 0–0.9)
MONOCYTES # BLD AUTO: 2.04 K/UL — HIGH (ref 0–0.9)
MONOCYTES NFR BLD AUTO: 5.2 % — SIGNIFICANT CHANGE UP (ref 2–14)
MONOCYTES NFR BLD AUTO: 6.9 % — SIGNIFICANT CHANGE UP (ref 2–14)
NEUTROPHILS # BLD AUTO: 26.96 K/UL — HIGH (ref 1.8–7.4)
NEUTROPHILS # BLD AUTO: 9.4 K/UL — HIGH (ref 1.8–7.4)
NEUTROPHILS NFR BLD AUTO: 63.5 % — SIGNIFICANT CHANGE UP (ref 43–77)
NEUTROPHILS NFR BLD AUTO: 89.6 % — HIGH (ref 43–77)
NEUTS BAND # BLD: 1.7 % — SIGNIFICANT CHANGE UP (ref 0–8)
NEUTS BAND NFR BLD: 1.7 % — SIGNIFICANT CHANGE UP (ref 0–8)
NRBC # BLD: 1 /100 WBCS — HIGH (ref 0–0)
NRBC BLD-RTO: 1 /100 WBCS — HIGH (ref 0–0)
OVALOCYTES BLD QL SMEAR: SIGNIFICANT CHANGE UP
OVALOCYTES BLD QL SMEAR: SLIGHT — SIGNIFICANT CHANGE UP
PLAT MORPH BLD: NORMAL — SIGNIFICANT CHANGE UP
PLAT MORPH BLD: NORMAL — SIGNIFICANT CHANGE UP
PLATELET # BLD AUTO: 176 K/UL — SIGNIFICANT CHANGE UP (ref 150–400)
PLATELET # BLD AUTO: 228 K/UL — SIGNIFICANT CHANGE UP (ref 150–400)
PLATELET # BLD AUTO: 261 K/UL — SIGNIFICANT CHANGE UP (ref 150–400)
POIKILOCYTOSIS BLD QL AUTO: SIGNIFICANT CHANGE UP
POIKILOCYTOSIS BLD QL AUTO: SLIGHT — SIGNIFICANT CHANGE UP
POLYCHROMASIA BLD QL SMEAR: SLIGHT — SIGNIFICANT CHANGE UP
POLYCHROMASIA BLD QL SMEAR: SLIGHT — SIGNIFICANT CHANGE UP
POTASSIUM SERPL-MCNC: 3.4 MMOL/L — LOW (ref 3.5–5.3)
POTASSIUM SERPL-MCNC: 4.2 MMOL/L — SIGNIFICANT CHANGE UP (ref 3.5–5.3)
POTASSIUM SERPL-SCNC: 3.4 MMOL/L — LOW (ref 3.5–5.3)
POTASSIUM SERPL-SCNC: 4.2 MMOL/L — SIGNIFICANT CHANGE UP (ref 3.5–5.3)
PROT SERPL-MCNC: 5 G/DL — LOW (ref 6.6–8.7)
PROTHROM AB SERPL-ACNC: 15.1 SEC — HIGH (ref 9.9–13.4)
RBC # BLD: 3.54 M/UL — LOW (ref 4.2–5.8)
RBC # BLD: 3.73 M/UL — LOW (ref 4.2–5.8)
RBC # BLD: 3.77 M/UL — LOW (ref 4.2–5.8)
RBC # FLD: 16.8 % — HIGH (ref 10.3–14.5)
RBC # FLD: 16.9 % — HIGH (ref 10.3–14.5)
RBC # FLD: 17.2 % — HIGH (ref 10.3–14.5)
RBC BLD AUTO: ABNORMAL
RBC BLD AUTO: ABNORMAL
SODIUM SERPL-SCNC: 141 MMOL/L — SIGNIFICANT CHANGE UP (ref 135–145)
SODIUM SERPL-SCNC: 142 MMOL/L — SIGNIFICANT CHANGE UP (ref 135–145)
TROPONIN T, HIGH SENSITIVITY RESULT: 167 NG/L — HIGH (ref 0–51)
TROPONIN T, HIGH SENSITIVITY RESULT: 33 NG/L — SIGNIFICANT CHANGE UP (ref 0–51)
VARIANT LYMPHS # BLD: 0.9 % — SIGNIFICANT CHANGE UP (ref 0–6)
VARIANT LYMPHS # BLD: 9.6 % — HIGH (ref 0–6)
VARIANT LYMPHS NFR BLD MANUAL: 0.9 % — SIGNIFICANT CHANGE UP (ref 0–6)
VARIANT LYMPHS NFR BLD MANUAL: 9.6 % — HIGH (ref 0–6)
WBC # BLD: 14.8 K/UL — HIGH (ref 3.8–10.5)
WBC # BLD: 21.19 K/UL — HIGH (ref 3.8–10.5)
WBC # BLD: 29.53 K/UL — HIGH (ref 3.8–10.5)
WBC # FLD AUTO: 14.8 K/UL — HIGH (ref 3.8–10.5)
WBC # FLD AUTO: 21.19 K/UL — HIGH (ref 3.8–10.5)
WBC # FLD AUTO: 29.53 K/UL — HIGH (ref 3.8–10.5)

## 2025-01-18 PROCEDURE — 72125 CT NECK SPINE W/O DYE: CPT | Mod: 26

## 2025-01-18 PROCEDURE — 99223 1ST HOSP IP/OBS HIGH 75: CPT

## 2025-01-18 PROCEDURE — 71045 X-RAY EXAM CHEST 1 VIEW: CPT | Mod: 26

## 2025-01-18 PROCEDURE — 71275 CT ANGIOGRAPHY CHEST: CPT | Mod: 26

## 2025-01-18 PROCEDURE — 43753 TX GASTRO INTUB W/ASP: CPT | Mod: 59

## 2025-01-18 PROCEDURE — 99284 EMERGENCY DEPT VISIT MOD MDM: CPT

## 2025-01-18 PROCEDURE — 71045 X-RAY EXAM CHEST 1 VIEW: CPT | Mod: 26,77

## 2025-01-18 PROCEDURE — 74178 CT ABD&PLV WO CNTR FLWD CNTR: CPT | Mod: 26

## 2025-01-18 PROCEDURE — 74176 CT ABD & PELVIS W/O CONTRAST: CPT | Mod: 26,59

## 2025-01-18 PROCEDURE — 99291 CRITICAL CARE FIRST HOUR: CPT | Mod: 25

## 2025-01-18 PROCEDURE — 70450 CT HEAD/BRAIN W/O DYE: CPT | Mod: 26

## 2025-01-18 RX ORDER — ONDANSETRON 4 MG/1
4 TABLET, ORALLY DISINTEGRATING ORAL ONCE
Refills: 0 | Status: COMPLETED | OUTPATIENT
Start: 2025-01-18 | End: 2025-01-18

## 2025-01-18 RX ORDER — LIPASE/PROTEASE/AMYLASE 4K-25K-20K
1 CAPSULE,DELAYED RELEASE (ENTERIC COATED) ORAL
Refills: 0 | DISCHARGE

## 2025-01-18 RX ORDER — ACETAMINOPHEN 160 MG/5ML
1000 SUSPENSION ORAL ONCE
Refills: 0 | Status: COMPLETED | OUTPATIENT
Start: 2025-01-18 | End: 2025-01-18

## 2025-01-18 RX ORDER — HYDROCHLOROTHIAZIDE 50 MG
1 TABLET ORAL
Refills: 0 | DISCHARGE

## 2025-01-18 RX ORDER — IDARUCIZUMAB 50 MG/ML
2.5 INJECTION INTRAVENOUS
Refills: 0 | Status: COMPLETED | OUTPATIENT
Start: 2025-01-18 | End: 2025-01-18

## 2025-01-18 RX ORDER — BACTERIOSTATIC SODIUM CHLORIDE 0.9 %
2600 VIAL (ML) INJECTION ONCE
Refills: 0 | Status: COMPLETED | OUTPATIENT
Start: 2025-01-18 | End: 2025-01-18

## 2025-01-18 RX ORDER — BENZOCAINE, BUTAMBEN, AND TETRACAINE HYDROCHLORIDE .028; .004; .004 G/.2G; G/.2G; G/.2G
1 SOLUTION TOPICAL ONCE
Refills: 0 | Status: COMPLETED | OUTPATIENT
Start: 2025-01-18 | End: 2025-01-18

## 2025-01-18 RX ORDER — ONDANSETRON 4 MG/1
4 TABLET, ORALLY DISINTEGRATING ORAL ONCE
Refills: 0 | Status: DISCONTINUED | OUTPATIENT
Start: 2025-01-18 | End: 2025-01-24

## 2025-01-18 RX ORDER — LIDOCAINE HYDROCHLORIDE 30 MG/G
15 CREAM TOPICAL ONCE
Refills: 0 | Status: COMPLETED | OUTPATIENT
Start: 2025-01-18 | End: 2025-01-18

## 2025-01-18 RX ORDER — NEBIVOLOL 10 MG/1
10 TABLET ORAL DAILY
Refills: 0 | Status: DISCONTINUED | OUTPATIENT
Start: 2025-01-18 | End: 2025-01-24

## 2025-01-18 RX ORDER — PIPERACILLIN SODIUM AND TAZOBACTAM SODIUM 2; 250 G/50ML; MG/50ML
3.38 INJECTION, POWDER, FOR SOLUTION INTRAVENOUS ONCE
Refills: 0 | Status: COMPLETED | OUTPATIENT
Start: 2025-01-18 | End: 2025-01-18

## 2025-01-18 RX ORDER — PANTOPRAZOLE 20 MG/1
80 TABLET, DELAYED RELEASE ORAL ONCE
Refills: 0 | Status: COMPLETED | OUTPATIENT
Start: 2025-01-18 | End: 2025-01-18

## 2025-01-18 RX ORDER — PANTOPRAZOLE 20 MG/1
40 TABLET, DELAYED RELEASE ORAL EVERY 12 HOURS
Refills: 0 | Status: DISCONTINUED | OUTPATIENT
Start: 2025-01-18 | End: 2025-01-24

## 2025-01-18 RX ADMIN — IDARUCIZUMAB 600 GRAM(S): 50 INJECTION INTRAVENOUS at 11:01

## 2025-01-18 RX ADMIN — PIPERACILLIN SODIUM AND TAZOBACTAM SODIUM 25 GRAM(S): 2; 250 INJECTION, POWDER, FOR SOLUTION INTRAVENOUS at 11:26

## 2025-01-18 RX ADMIN — PANTOPRAZOLE 80 MILLIGRAM(S): 20 TABLET, DELAYED RELEASE ORAL at 09:59

## 2025-01-18 RX ADMIN — PANTOPRAZOLE 40 MILLIGRAM(S): 20 TABLET, DELAYED RELEASE ORAL at 18:22

## 2025-01-18 RX ADMIN — ONDANSETRON 4 MILLIGRAM(S): 4 TABLET, ORALLY DISINTEGRATING ORAL at 15:34

## 2025-01-18 RX ADMIN — PIPERACILLIN SODIUM AND TAZOBACTAM SODIUM 200 GRAM(S): 2; 250 INJECTION, POWDER, FOR SOLUTION INTRAVENOUS at 07:35

## 2025-01-18 RX ADMIN — Medication 1 MILLIGRAM(S): at 14:56

## 2025-01-18 RX ADMIN — LIDOCAINE HYDROCHLORIDE 15 MILLILITER(S): 30 CREAM TOPICAL at 14:43

## 2025-01-18 RX ADMIN — IDARUCIZUMAB 600 GRAM(S): 50 INJECTION INTRAVENOUS at 11:08

## 2025-01-18 RX ADMIN — Medication 2600 MILLILITER(S): at 07:17

## 2025-01-18 NOTE — H&P ADULT - HISTORY OF PRESENT ILLNESS
64 year old male with hypertension, atrial fibrillation, lupus anticoagulant and pancreatic cancer s/p Whipple's, XRT and currently on chemotherapy which was held last month presents with dizziness, fatigue and exertional dyspnea when he awoke this morning associated with bloody bowel movements.  Denies any nausea, vomiting or abdominal pain. Bowel movement previously 'abstract' with light to dark brown stool. Denies any hematemesis chest pain, palpitations, fever, chills, hematemesis.      Noted have anemia, received 2L NaCl, Praxbind, PipTaz, Acetaminophen, Pantoprazole and PRBC x 2 in ER.  Feels better now

## 2025-01-18 NOTE — CONSULT NOTE ADULT - SUBJECTIVE AND OBJECTIVE BOX
Chief Complaint:  Patient is a 64y old  gentleman who presented with weakness / syncope found down at home / acute on chronic anemia / symptomatic     HPI:  Mr. Dawn is a 64 year old gentleman with significant past medical history of pancreatic cancer following with [facility] s/p Whipple/chemo/RT currently on chemotherapy which has been held in the setting of dizziness/fatigue/exertional dyspnea/ anemia for which he was being followed closely, history of DVT in the setting of lupus anti-coagulant, paroxsymal atrial fibrillation and chronic Pradaxa for at least 20 years, who presents from home after being found down on the floor of his room, patient reported standing up and feeling light headed, did not lose consciousness although admits to dizziness, decided to sit down on the ground (slowly slid down to the floor) and then describes severe weakness for which he was unable to rise or to call for help, he was down for anywhere between 20-45 minutes per recall. He was found by his wife who phoned EMS to bring him to hospital. Previous to this event patient endorses feeling at his baseline level of health. He denies recent illness. Symptoms came on suddenly without warning. He then developed perfuse bloody bowel movements for which he presents to the ED.     On arrival noted to have anemia (acute on chronic), hypotension, initial labs concerning for poor profusion, lactic acidosis 11, leukocytosis 30K, given 2L normal saline and 2 units of pRBCs with marked improvement in hemodynamics and symptoms. Repeat lactic acidosis reduced. GI consulted for GI bleeding.     PAST MEDICAL & SURGICAL HISTORY:  Hypertension  Paroxysmal atrial fibrillation  Lupus anticoagulant positive  Pancreatic cancer  H/O Whipple procedure  H/O left knee surgery    REVIEW OF SYSTEMS:   General: Negative  HEENT: Negative  CV: Negative  Respiratory: Negative  GI: See HPI  : Negative  MSK: Negative  Hematologic: Negative  Skin: Negative    MEDICATIONS:   MEDICATIONS  (STANDING):  nebivolol 10 milliGRAM(s) Oral daily  ondansetron Injectable 4 milliGRAM(s) IV Push once  pantoprazole  Injectable 40 milliGRAM(s) IV Push every 12 hours    MEDICATIONS  (PRN):  Packed Red Cells Order:  1 Unit  Indication: Anemia due to Active Hemorrhage - Medical Conditions e.  Infuse Unit : 1 Hour (01-18-25 @ 08:52)  Packed Red Cells Order:  1 Unit  Indication: Anemia due to Active Hemorrhage - Medical Conditions e.  Infuse Unit : 1 Hour (01-18-25 @ 08:52)    DIET:  Diet, Clear Liquid (01-18-25 @ 15:27) [Active]    ALLERGIES:   No Known Drug Allergies  latex (Rash)    Health Management  Last Colonoscopy: Never   Last Endoscopy: s/p Whipple procedure     VITAL SIGNS:   Vital Signs Last 24 Hrs  T(C): 37.2 (18 Jan 2025 17:07), Max: 37.2 (18 Jan 2025 17:07)  T(F): 98.9 (18 Jan 2025 17:07), Max: 98.9 (18 Jan 2025 17:07)  HR: 85 (18 Jan 2025 17:07) (69 - 89)  BP: 134/85 (18 Jan 2025 17:07) (81/71 - 134/85)  BP(mean): 92 (18 Jan 2025 16:30) (89 - 97)  RR: 18 (18 Jan 2025 17:07) (14 - 18)  SpO2: 98% (18 Jan 2025 17:07) (98% - 100%)    Parameters below as of 18 Jan 2025 17:07  Patient On (Oxygen Delivery Method): room air      I&O's Summary      PHYSICAL EXAM:   GENERAL:  No acute distress  HEENT:  NC/AT, conjunctiva clear, sclera anicteric  CHEST:  No increased effort  HEART:  Regular rate  ABDOMEN:  Soft, non-tender, non-distended, normoactive bowel sounds, no rebound or guarding  EXTREMITIES: No edema  SKIN:  Warm, dry  NEURO:  Calm, cooperative    LABS:             9.6    29.53 )-----------( 261      ( 18 Jan 2025 11:30 )             30.0     Hemoglobin: 9.6 g/dL (01-18-25 @ 11:30)  Hemoglobin: 8.5 g/dL (01-18-25 @ 06:21)    01-18    142  |  109[H]  |  20.4[H]  ----------------------------<  133[H]  4.2   |  22.0  |  1.07    Ca    7.6[L]      18 Jan 2025 12:15    TPro  5.0[L]  /  Alb  2.9[L]  /  TBili  0.7  /  DBili  x   /  AST  75[H]  /  ALT  36  /  AlkPhos  122[H]  01-18    LIVER FUNCTIONS - ( 18 Jan 2025 06:21 )  Alb: 2.9 g/dL / Pro: 5.0 g/dL / ALK PHOS: 122 U/L / ALT: 36 U/L / AST: 75 U/L / GGT: x         PT/INR - ( 18 Jan 2025 06:21 )   PT: 15.1 sec;   INR: 1.34 ratio    PTT - ( 18 Jan 2025 06:21 )  PTT:38.6 sec      RADIOLOGY & ADDITIONAL STUDIES:    ACC: [id number] EXAM:  CT ABDOMEN AND PELVIS WAW IC   ORDERED BY: MARIBETH JAIME     PROCEDURE DATE:  01/18/2025      INTERPRETATION:  CLINICAL INFORMATION: GI bleed, bloody bowel movements    ADDITIONAL CLINICAL INFORMATION: Other, Non-specified    COMPARISON: January 18, 2025    CONTRAST/COMPLICATIONS:  IV Contrast: Omnipaque 350  95 cc administered   5 cc discarded  Oral Contrast: NONE  .    PROCEDURE:  CT of the Abdomen and Pelvis was performed.  Precontrast, Arterial and Delayed phases were performed.  Sagittal and coronal reformats were performed.    FINDINGS:  LOWER CHEST: Within normal limits.    LIVER: Areas of steatosis in the left and right lobes. Scattered small   cysts.  BILE DUCTS: Normal caliber.Pneumobilia compatible with Whipple procedure.  GALLBLADDER: Cholecystectomy.  SPLEEN: Within normal limits.  PANCREAS: Status post Whipple procedure. Unremarkable pancreatic remnant.  ADRENALS: Within normal limits.  KIDNEYS/URETERS: Bilateral renal cysts.    BLADDER: Within normal limits.  REPRODUCTIVE ORGANS: Moderate prostatomegaly.    BOWEL: No bowel obstruction. Appendix not definitively visualized. No   active gastrointestinal bleed. Gastrojejunostomy as per Whipple procedure.  PERITONEUM/RETROPERITONEUM: Within normal limits.  VESSELS: Chronic splenic vein and severe focal stenosis of the   extrahepatic portal vein, with numerous upper abdominal collateral   vessels.  LYMPH NODES: No lymphadenopathy.  ABDOMINAL WALL: Within normal limits.  BONES: No acute findings.    IMPRESSION:  No active gastrointestinal bleed.  Status post Whipple procedure.  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    --- End of Report ---    KEN PRITCHARD MD; Attending Radiologist  This document has been electronically signed. Jan 18 2025 10:56AM  01-18-25 @ 09:46    ACC: [id number] EXAM:  CT ABDOMEN AND PELVIS   ORDERED BY: GABRIELLE MODI ABU     PROCEDURE DATE:  01/18/2025      INTERPRETATION:  CLINICAL INFORMATION: History of pancreatic cancer   status post Whipple. Abdominal pain and nausea after syncopal episode and   fall.    COMPARISON: CT 3/10/2023    CONTRAST/COMPLICATIONS:  IV Contrast: None  Oral Contrast: None      PROCEDURE:  CT of the Abdomen and Pelvis was performed.  Sagittal and coronal reformats were performed.    FINDINGS: Evaluation of the solid organs and vasculature is limited in   the absence of intravenous contrast.    LOWER CHEST: Within normal limits.    LIVER: Normal morphology with scattered hepatic cysts and subcentimeter   additional hypodense lesions that are too small to characterize.  BILE DUCTS: Mild intrahepatic biliary ductal dilatation is similar to   prior.  GALLBLADDER: Cholecystectomy.  SPLEEN: Splenomegaly.  PANCREAS: Status post Whipple. The remnant pancreas is grossly   unremarkable without contrast.  ADRENALS: Within normal limits.  KIDNEYS/URETERS: Residual contrast from prior CTA chest. Bilateral renal   cysts and subcentimeter hypodense foci that are too small to   characterize. No hydronephrosis.    BLADDER: Within normal limits.  REPRODUCTIVE ORGANS: Enlarged prostate.    BOWEL: Distended stomach to the gastrojejunostomy. The colon is diffusely   distended with a large amount of stool. Fluid-filled, mildly dilated mid   to distal small bowel loops with decompressed terminal ileum, question   bowel obstruction. Appendix is normal.  PERITONEUM/RETROPERITONEUM: No pneumoperitoneum or ascites.  VESSELS: Atherosclerotic changes.  LYMPH NODES: No lymphadenopathy.  ABDOMINAL WALL: Within normal limits.  BONES: Degenerative changes.    IMPRESSION:  Mildly dilated mid to distal small bowel loops with decompressed terminal   ileum, raising the possibility of small bowel obstruction. This is not   well elucidated without oral and IV contrast.    Fluid-filled, markedly dilated stomach. Large amountof stool throughout   the colon.    Status post Whipple. Small hypodense liver lesions are too small to   characterize. Limited assessment for tumor or metastatic disease without   IV contrast.    --- End of Report ---    KAILEE DOMINGUEZ MD; Attending Radiologist  This document has been electronically signed. Jan 18 2025  7:58AM  01-18-25 @ 07:13

## 2025-01-18 NOTE — ED PROVIDER NOTE - ATTENDING CONTRIBUTION TO CARE
Dr. Lowery : I have personally seen and examined this patient at the bedside. I have fully participated in the care of this patient. I have reviewed all pertinent clinical information, including history, physical exam, plan and the Resident's note and agree except as noted.     64 year old male w/PMHx pancreatic CA (s/p Whipple on 10/2023, s/p JACOB, HTN, A-fib (on Pradaxa), prior PE on eliquis presents to the ED after a syncopal episode. States he has been feeling weak all day, associated with SOB, abd pain and nausea. Denies fevers, chill. Reports normal bowel and bladder movements. Reports while in the bathroom fell backwards and was unable to get up. Landed against the door and had to knock on it to get attention.    Denies f/c/v/cp/palpitations/cough//d/c/dysuria/hematuria. no sick contacts/recent travel. no black stools    PE:  head; atraumatic normocephalic  eyes: perrla  Heart: rrr s1s2  lungs: ctab  abd: soft, nt nd + bs no rebound/guarding no cva ttp  le: no swelling no calf ttp  back: no midline cervical/thoracic/lumbar ttp      -->hypotensive on arrival systolic in 60s started on sepsis protocol fluids abx ct head chest /abd - while in the ed pt had 2 bloody bms; will give pradaxa reversal blood products ct abd to eval for bleed  pt signed out to dr. alfonso

## 2025-01-18 NOTE — CONSULT NOTE ADULT - ASSESSMENT
64M with hx of HTN, HLD, Afib, prothrombin gene mutation on Pradaxa, hx of pancreatic cancer s/p Whipple/chemo/radiation/JACOB, hx of DVT/PE presented to the ED with episode of presyncope at home found to be hypotensive on presentation with acute on chronic anemia initially treated for sepsis and found with possible SBO on imaging with ED course complicated by acute likely lower GI bleed responsive to fluid/blood resuscitation     Hypotension   likely secondary to hypovolemia in the setting of acute blood loss anemia and possible component of sepsis   remains hemodynamically stable after fluid resuscitation and blood transfusion   lactate significantly downtrended post resuscitation as well   continue trending CBC q-46h   transfuse for goal Hgb >7 or with active bleeding   s/p reversal of Pradaxa in the ED   maintain 2 large bore IVs   no source of active bleeding noted on CT   GI following and may undergo eventual colonoscopy pending course   possible SBO management as per surgery   continue trending lactate  serial abd exam   c/w empiric abx pending cx   as pt remains hemodynamically stable at this time post transfusion, no current indication for ICU level of care; can monitor on SDU at this time   maintain NPO   can c/w empiric PPI BID   hold home antihypertensives    please call back if any change in clinical condition or worsened bleeding   if recurrent large volume hematochezia, will need IR evaluation for possible embolization

## 2025-01-18 NOTE — ED PROVIDER NOTE - OBJECTIVE STATEMENT
64 year old male w/PMHx pancreatic CA (s/p Whipple on 10/2023, s/p JACOB, HTN, A-fib (on Pradaxa), prior PE on eliquis presents to the ED after a syncopal episode. States he has been feeling weak all day, associated with SOB, abd pain and nausea. Denies fevers, chill. Reports normal bowel and bladder movements. Reports while in the bathroom fell backwards and was unable to get up. Landed against the door and had to knock on it to get attention. Currently without dizziness, vision changes or chest pain.

## 2025-01-18 NOTE — ED ADULT NURSE REASSESSMENT NOTE - NS ED NURSE REASSESS COMMENT FT1
[facility] care 0715, received report from Sheila roque RN, pt a&ox4, antibiotics administered, respirations even and unlabored on 4 L n/c, pt hypotensive still but stable, had bloody bowel movement, provider aware, no distress noted.

## 2025-01-18 NOTE — H&P ADULT - NSHPPHYSICALEXAM_GEN_ALL_CORE
OBJECTIVE:  Vital Signs Last 24 Hrs  T(C): 36.3 (18 Jan 2025 10:12), Max: 36.4 (18 Jan 2025 08:57)  T(F): 97.4 (18 Jan 2025 10:12), Max: 97.6 (18 Jan 2025 08:57)  HR: 75 (18 Jan 2025 13:30) (69 - 81)  BP: 117/84 (18 Jan 2025 13:30) (81/71 - 117/84)  BP(mean): 94 (18 Jan 2025 13:30) (89 - 94)  RR: 18 (18 Jan 2025 13:30) (14 - 18)  SpO2: 100% (18 Jan 2025 13:30) (99% - 100%)    Parameters below as of 18 Jan 2025 13:30  Patient On (Oxygen Delivery Method): room air        PHYSICAL EXAMINATION  General: Middle aged male sitting up on stretcher, fatigued  HEENT:  Pale conjunctiva, anicteric sclera  NECK:  Supple  CVS: Irregular S1 S2  RESP:  Clear to auscultation bilaterally  GI:  Soft with healed scar. No tenderness  : No suprapubic tenderness  MSK:  Moves all extremities  CNS:  Awake, sleepy, oriented x 3. Fluent speech, no gross focal or global deficit appreciated  INTEG:  Warm skin  PSYCH:  Fair mood, fatigued and tired

## 2025-01-18 NOTE — CONSULT NOTE ADULT - ATTENDING COMMENTS
Patient seen and examined at bedside. No symptoms concerning for obstruction. Patient complains of blood bowel movements concerning for active GI bleed    Recommend GI for possible endoscopy/colonoscopy  Trend H/H and transfuse appropriately  Serial abdominal exams  trend lactate  Medical management per primary team

## 2025-01-18 NOTE — ED PROVIDER NOTE - PHYSICAL EXAMINATION
Gen: NAD, AOx3  Head: NCAT  HEENT: dry oral mucosa  Lung: CTAB, no respiratory distress  CV: murmu, Normal perfusion  Abd: healing mid scar, soft, NTND  MSK: No edema, no visible deformities  Neuro: No focal neurologic deficits  Skin: No rash   Psych: normal affect

## 2025-01-18 NOTE — ED ADULT NURSE REASSESSMENT NOTE - NS ED NURSE REASSESS COMMENT FT1
Initiated blood transfusion after pt had very large bloody bowel movement, MD Lowery and MD Garcia at bedside called to bedside during episode. Type and screen sent, bought to priority CT scan. Pt has /cm in place, blood running, no distress noted at this time. Initiated blood transfusion after pt had very large bloody bowel movement, MD Tavera and MD Garcia at bedside called to bedside during episode. Type and screen sent, bought to priority CT scan. Pt has /cm in place, blood running, no distress noted at this time.

## 2025-01-18 NOTE — PROVIDER CONTACT NOTE (OTHER) - ASSESSMENT
Patient denies any pain/discomfort in abdomen. No tenderness on palpation. Denies N/V and VSS. NGT flushed with air, minimal abdominal sounds heard on auscultation.

## 2025-01-18 NOTE — CONSULT NOTE ADULT - ASSESSMENT
Mr. Dawn is a 64 year old gentleman who presents with acute onset hypotension and near syncope followed by several large bloody bowel movements and CT evidence concerning for possible bowel obstruction s/p NGT placement with small volume bloody gastric contents, history and presentation most likely consistent with low flow state (ischemia), bleeding noted after syncopal event without prior episodes therefore anemia more likely consistent with acute on chronic causes (chemotherapy / etc), found down at home (down time approximately 20-40 minutes), severe lactic acidosis on arrival also consistent as well as leukocytosis (30K) s/p resuscitation, patient at increased risk for low flow states given clinical history, patient on Pradaxa (now reversed) which may have exacerbated his bleeding, patient does admit to abdominal discomfort and being in an uncomfortable awkward position on the floor at home, CT evidence concerning for SBO which may represent ileus which can be a sequela of relative hypoperfusion / ischemia as well. Low suspicion presentation and history consistent with diverticular bleeding / hemorrhoidal bleeding / angiectasia / etc. No role for emergent Colonoscopy at this time, alternatively would continue supportive care and maintain adequate volume status / perfusion, bloody effluent from NGT also likely in the setting of acute ileus, low suspicion for volvulus or incarcerated hernia given CT findings, would also add no evidence of active GI hemorrhage on CT. Patient has never had a Colonoscopy in the past, pending improvement of above could consider evaluation at some point (inpatient or outpatient). However patient would need to undergo bowel preparation which he is unable to complete given NGT for SBO/Ileus, additionally the vast majority of lower gastrointestinal hemorrhage can be managed with supportive care and frequently do not require an acute intervention. If patients clinical condition continues to worsen despite adequate resuscitation and hemodynamic stabilization can consider more urgent evaluation for ischemia to aid in determination of destination therapy for ischemia. Discussed at length with patient and family at bedside. We will continue to follow along with you.

## 2025-01-18 NOTE — CONSULT NOTE ADULT - SUBJECTIVE AND OBJECTIVE BOX
Surgery Consult    Consulting attending:      HPI:        PAST MEDICAL HISTORY:  A-fib    Hypertension    Prothrombin deficiency          PAST SURGICAL HISTORY:  No significant past surgical history          MEDICATIONS:  acetaminophen   IVPB .. 1000 milliGRAM(s) IV Intermittent Once  pantoprazole  Injectable 80 milliGRAM(s) IV Push Once  piperacillin/tazobactam IVPB.. 3.375 Gram(s) IV Intermittent once        ALLERGIES:  No Known Drug Allergies  latex (Rash)        VITALS & I/Os:  Vital Signs Last 24 Hrs  T(C): 36.1 (18 Jan 2025 06:43), Max: 36.1 (18 Jan 2025 06:43)  T(F): 97 (18 Jan 2025 06:43), Max: 97 (18 Jan 2025 06:43)  HR: 75 (18 Jan 2025 08:31) (73 - 81)  BP: 101/77 (18 Jan 2025 08:31) (81/71 - 104/81)  BP(mean): 89 (18 Jan 2025 07:42) (89 - 89)  RR: 18 (18 Jan 2025 08:31) (14 - 18)  SpO2: 99% (18 Jan 2025 08:31) (99% - 100%)    Parameters below as of 18 Jan 2025 08:31  Patient On (Oxygen Delivery Method): nasal cannula  O2 Flow (L/min): 4      I&O's Summary        PHYSICAL EXAM:  Constitutional: patient resting comfortably in bed, in no acute distress  HEENT: EOMI / PERRL b/l, no active drainage or redness  Neck: No JVD, full ROM without pain  Respiratory: respirations are unlabored, no accessory muscle use, no conversational dyspnea  Cardiovascular: regular rate & rhythm  Gastrointestinal: Abdomen soft, non-tender, non-distended, no rebound tenderness / guarding  Neurological: GCS: 15 (4/5/6). A&O x 3; no gross sensory / motor / coordination deficits  Psychiatric: Normal mood, normal affect  Musculoskeletal: No joint pain, swelling or deformity; no limitation of movement  Vascular:        LABS:                        8.5    14.80 )-----------( 228      ( 18 Jan 2025 06:21 )             29.3     01-18    141  |  102  |  15.3  ----------------------------<  319[H]  3.4[L]   |  15.0[L]  |  1.10    Ca    8.1[L]      18 Jan 2025 06:21    TPro  5.0[L]  /  Alb  2.9[L]  /  TBili  0.7  /  DBili  x   /  AST  75[H]  /  ALT  36  /  AlkPhos  122[H]  01-18    Lactate:  01-18 @ 06:21  11.60    PT/INR - ( 18 Jan 2025 06:21 )   PT: 15.1 sec;   INR: 1.34 ratio         PTT - ( 18 Jan 2025 06:21 )  PTT:38.6 sec          Urinalysis Basic - ( 18 Jan 2025 06:21 )    Color: x / Appearance: x / SG: x / pH: x  Gluc: 319 mg/dL / Ketone: x  / Bili: x / Urobili: x   Blood: x / Protein: x / Nitrite: x   Leuk Esterase: x / RBC: x / WBC x   Sq Epi: x / Non Sq Epi: x / Bacteria: x          IMAGING:                                                                                Surgery Consult    Consulting attending: Dr. Spencer      HPI: 63 yo M with a hx of HTN, HLD. afib,  coagulation disorder (? lupus anticoagulant) complicated by DVT/PE on  Pradaxa , pancreatic ca s/p Whipple. Presents to ED with weakness and a syncopal episode. Patient also endorses SOB and nausea. Denies any emesis, no abdominal pain.  Initial workup including CTA chest was negative for PE however demonstrated a dilated stomach, this prompted a  CT A/P (non-contrast study) that  shows dilated, fluid-filled small bowel with decompressed TI. Surgery consulted to r/o SBO. Patient assessed at bedside and denies any emesis, +flatus, +BM ( that was nl prior). While in ED, patient noted to have two bright red BM. Pradaxa reversed with praxabind.     HgB 8.5 ( reversed with praxabind ) with an additional unit of PRBC ordered in ED. Leukocytosis (WBC 14). Acidotic with bicarb 15, lactate 11 (given 2.6 L bolus).   Hemodynamically well at this time, initial hypotension (systolic 80s) , improved. Normocardic , however patient is beta-blocked.   Patient states that he has never had a colonoscopy.         PAST MEDICAL HISTORY:  A-fib    Hypertension    Prothrombin deficiency    Lupus anticoagulant         PAST SURGICAL HISTORY:  Whipple           MEDICATIONS:  acetaminophen   IVPB .. 1000 milliGRAM(s) IV Intermittent Once  pantoprazole  Injectable 80 milliGRAM(s) IV Push Once  piperacillin/tazobactam IVPB.. 3.375 Gram(s) IV Intermittent once        ALLERGIES:  No Known Drug Allergies  latex (Rash)        VITALS & I/Os:  Vital Signs Last 24 Hrs  T(C): 36.1 (18 Jan 2025 06:43), Max: 36.1 (18 Jan 2025 06:43)  T(F): 97 (18 Jan 2025 06:43), Max: 97 (18 Jan 2025 06:43)  HR: 75 (18 Jan 2025 08:31) (73 - 81)  BP: 101/77 (18 Jan 2025 08:31) (81/71 - 104/81)  BP(mean): 89 (18 Jan 2025 07:42) (89 - 89)  RR: 18 (18 Jan 2025 08:31) (14 - 18)  SpO2: 99% (18 Jan 2025 08:31) (99% - 100%)    Parameters below as of 18 Jan 2025 08:31  Patient On (Oxygen Delivery Method): nasal cannula  O2 Flow (L/min): 4      I&O's Summary        PHYSICAL EXAM:  Constitutional: patient resting comfortably in bed, in no acute distress  HEENT: EOMI / PERRL b/l, no active drainage or redness  Neck: No JVD, full ROM without pain  Respiratory: respirations are unlabored, no accessory muscle use, no conversational dyspnea  Cardiovascular: regular rate & rhythm  Gastrointestinal: Abdomen soft, non-tender, non-distended, no rebound tenderness / guarding  Neurological: GCS: 15 (4/5/6). A&O x 3; no gross sensory / motor / coordination deficits  Psychiatric: Normal mood, normal affect  Musculoskeletal: No joint pain, swelling or deformity; no limitation of movement  Vascular:        LABS:                        8.5    14.80 )-----------( 228      ( 18 Jan 2025 06:21 )             29.3     01-18    141  |  102  |  15.3  ----------------------------<  319[H]  3.4[L]   |  15.0[L]  |  1.10    Ca    8.1[L]      18 Jan 2025 06:21    TPro  5.0[L]  /  Alb  2.9[L]  /  TBili  0.7  /  DBili  x   /  AST  75[H]  /  ALT  36  /  AlkPhos  122[H]  01-18    Lactate:  01-18 @ 06:21  11.60    PT/INR - ( 18 Jan 2025 06:21 )   PT: 15.1 sec;   INR: 1.34 ratio         PTT - ( 18 Jan 2025 06:21 )  PTT:38.6 sec          Urinalysis Basic - ( 18 Jan 2025 06:21 )    Color: x / Appearance: x / SG: x / pH: x  Gluc: 319 mg/dL / Ketone: x  / Bili: x / Urobili: x   Blood: x / Protein: x / Nitrite: x   Leuk Esterase: x / RBC: x / WBC x   Sq Epi: x / Non Sq Epi: x / Bacteria: x          IMAGING:  < from: CT Abdomen and Pelvis No Cont (01.18.25 @ 07:13) >  PROCEDURE:  CT of the Abdomen and Pelvis was performed.  Sagittal and coronal reformats were performed.    FINDINGS: Evaluation of the solid organs and vasculature is limited in   the absence of intravenous contrast.    LOWER CHEST: Within normal limits.    LIVER: Normal morphology with scattered hepatic cysts and subcentimeter   additional hypodense lesions that are too small to characterize.  BILE DUCTS: Mild intrahepatic biliary ductal dilatation is similar to   prior.  GALLBLADDER: Cholecystectomy.  SPLEEN: Splenomegaly.  PANCREAS: Status post Whipple. The remnant pancreas is grossly   unremarkable without contrast.  ADRENALS: Within normal limits.  KIDNEYS/URETERS: Residual contrast from prior CTA chest. Bilateral renal   cysts and subcentimeter hypodense foci that are too small to   characterize. No hydronephrosis.    BLADDER: Within normal limits.  REPRODUCTIVE ORGANS: Enlarged prostate.    BOWEL: Distended stomach to the gastrojejunostomy. The colon is diffusely   distended with a large amount of stool. Fluid-filled, mildly dilated mid   to distal small bowel loops with decompressed terminal ileum, question   bowel obstruction. Appendix is normal.  PERITONEUM/RETROPERITONEUM: No pneumoperitoneum or ascites.  VESSELS: Atherosclerotic changes.  LYMPH NODES: No lymphadenopathy.  ABDOMINAL WALL: Within normal limits.  BONES: Degenerative changes.    IMPRESSION:  Mildly dilated mid to distal small bowel loops with decompressed terminal   ileum, raising the possibility of small bowel obstruction. This is not   well elucidated without oral and IV contrast.    Fluid-filled, markedly dilated stomach. Large amountof stool throughout   the colon.    Status post Whipple. Small hypodense liver lesions are too small to   characterize. Limited assessment for tumor or metastatic disease without   IV contrast.    < end of copied text >  < from: CT Angio Chest PE Protocol w/ IV Cont (01.18.25 @ 07:10) >    FINDINGS:    LUNGS AND LARGE AIRWAYS: Patent central airways. Scattered subsegmental   atelectasis. No evidence of pneumonia or suspicious pulmonary nodule.  PLEURA: No pleural effusion.  VESSELS: Within normal limits.  HEART: Heart size is normal. No pericardial effusion.  MEDIASTINUM AND LEXI: Bilateral hilar adenopathy is unchanged. Right   hilar adenopathy measures up to 2.0 x 1.6 cm. Left hilar adenopathy   measures up to 1.7 x 1.2 cm.  CHEST WALL AND LOWER NECK: Within normal limits.  VISUALIZED UPPER ABDOMEN: Pneumobilia, and small left hepatic cyst.   Distended stomach, right renal cyst. Abdominal findings are better   assessed at subsequent abdominal ultrasound.  BONES: Pectus excavatum. Degenerative changes.    IMPRESSION:  Noevidence of pulmonary embolism or pneumonia.    Bilateral hilar adenopathy is unchanged.    The upper abdominal findings, which are better evaluated at subsequent CT   abdomen/pelvis.    < end of copied text >                                                                                Surgery Consult    Consulting attending: Dr. Spencer      HPI: 65 yo M with a hx of HTN, HLD. afib,  coagulation disorder (? lupus anticoagulant) complicated by DVT/PE on  Pradaxa , pancreatic ca s/p Whipple. Presents to ED with weakness and a syncopal episode. Patient also endorses SOB and nausea. Denies any emesis, no abdominal pain.  Initial workup including CTA chest was negative for PE however demonstrated a dilated stomach, this prompted a  CT A/P (non-contrast study) that  shows dilated, fluid-filled small bowel with decompressed TI. Surgery consulted to r/o SBO. Patient assessed at bedside and denies any emesis, +flatus, +BM ( that was nl prior). While in ED, patient noted to have two bright red BM. Pradaxa reversed with praxabind.     HgB 8.5 ( reversed with praxabind ) with an additional unit of PRBC ordered in ED. Leukocytosis (WBC 14). Acidotic with bicarb 15, lactate 11 (given 2.6 L bolus).   Hemodynamically well at this time, initial hypotension (systolic 80s) , improved. Normocardic , however patient is beta-blocked.   Patient states that he has never had a colonoscopy.         PAST MEDICAL HISTORY:  A-fib    Hypertension    Prothrombin deficiency    Lupus anticoagulant         PAST SURGICAL HISTORY:  Whipple           MEDICATIONS:  acetaminophen   IVPB .. 1000 milliGRAM(s) IV Intermittent Once  pantoprazole  Injectable 80 milliGRAM(s) IV Push Once  piperacillin/tazobactam IVPB.. 3.375 Gram(s) IV Intermittent once        ALLERGIES:  No Known Drug Allergies  latex (Rash)        VITALS & I/Os:  Vital Signs Last 24 Hrs  T(C): 36.1 (18 Jan 2025 06:43), Max: 36.1 (18 Jan 2025 06:43)  T(F): 97 (18 Jan 2025 06:43), Max: 97 (18 Jan 2025 06:43)  HR: 75 (18 Jan 2025 08:31) (73 - 81)  BP: 101/77 (18 Jan 2025 08:31) (81/71 - 104/81)  BP(mean): 89 (18 Jan 2025 07:42) (89 - 89)  RR: 18 (18 Jan 2025 08:31) (14 - 18)  SpO2: 99% (18 Jan 2025 08:31) (99% - 100%)    Parameters below as of 18 Jan 2025 08:31  Patient On (Oxygen Delivery Method): nasal cannula  O2 Flow (L/min): 4      I&O's Summary        PHYSICAL EXAM:  Constitutional: patient resting comfortably in bed, in no acute distress  Respiratory: respirations are unlabored, no accessory muscle use, no conversational dyspnea  Cardiovascular: regular rate & rhythm  Gastrointestinal: Abdomen soft, non-tender, non-distended, no rebound tenderness / guarding  Neurological: A&O x 3        LABS:                        8.5    14.80 )-----------( 228      ( 18 Jan 2025 06:21 )             29.3     01-18    141  |  102  |  15.3  ----------------------------<  319[H]  3.4[L]   |  15.0[L]  |  1.10    Ca    8.1[L]      18 Jan 2025 06:21    TPro  5.0[L]  /  Alb  2.9[L]  /  TBili  0.7  /  DBili  x   /  AST  75[H]  /  ALT  36  /  AlkPhos  122[H]  01-18    Lactate:  01-18 @ 06:21  11.60    PT/INR - ( 18 Jan 2025 06:21 )   PT: 15.1 sec;   INR: 1.34 ratio         PTT - ( 18 Jan 2025 06:21 )  PTT:38.6 sec          Urinalysis Basic - ( 18 Jan 2025 06:21 )    Color: x / Appearance: x / SG: x / pH: x  Gluc: 319 mg/dL / Ketone: x  / Bili: x / Urobili: x   Blood: x / Protein: x / Nitrite: x   Leuk Esterase: x / RBC: x / WBC x   Sq Epi: x / Non Sq Epi: x / Bacteria: x          IMAGING:  < from: CT Abdomen and Pelvis No Cont (01.18.25 @ 07:13) >  PROCEDURE:  CT of the Abdomen and Pelvis was performed.  Sagittal and coronal reformats were performed.    FINDINGS: Evaluation of the solid organs and vasculature is limited in   the absence of intravenous contrast.    LOWER CHEST: Within normal limits.    LIVER: Normal morphology with scattered hepatic cysts and subcentimeter   additional hypodense lesions that are too small to characterize.  BILE DUCTS: Mild intrahepatic biliary ductal dilatation is similar to   prior.  GALLBLADDER: Cholecystectomy.  SPLEEN: Splenomegaly.  PANCREAS: Status post Whipple. The remnant pancreas is grossly   unremarkable without contrast.  ADRENALS: Within normal limits.  KIDNEYS/URETERS: Residual contrast from prior CTA chest. Bilateral renal   cysts and subcentimeter hypodense foci that are too small to   characterize. No hydronephrosis.    BLADDER: Within normal limits.  REPRODUCTIVE ORGANS: Enlarged prostate.    BOWEL: Distended stomach to the gastrojejunostomy. The colon is diffusely   distended with a large amount of stool. Fluid-filled, mildly dilated mid   to distal small bowel loops with decompressed terminal ileum, question   bowel obstruction. Appendix is normal.  PERITONEUM/RETROPERITONEUM: No pneumoperitoneum or ascites.  VESSELS: Atherosclerotic changes.  LYMPH NODES: No lymphadenopathy.  ABDOMINAL WALL: Within normal limits.  BONES: Degenerative changes.    IMPRESSION:  Mildly dilated mid to distal small bowel loops with decompressed terminal   ileum, raising the possibility of small bowel obstruction. This is not   well elucidated without oral and IV contrast.    Fluid-filled, markedly dilated stomach. Large amountof stool throughout   the colon.    Status post Whipple. Small hypodense liver lesions are too small to   characterize. Limited assessment for tumor or metastatic disease without   IV contrast.    < end of copied text >  < from: CT Angio Chest PE Protocol w/ IV Cont (01.18.25 @ 07:10) >    FINDINGS:    LUNGS AND LARGE AIRWAYS: Patent central airways. Scattered subsegmental   atelectasis. No evidence of pneumonia or suspicious pulmonary nodule.  PLEURA: No pleural effusion.  VESSELS: Within normal limits.  HEART: Heart size is normal. No pericardial effusion.  MEDIASTINUM AND LEXI: Bilateral hilar adenopathy is unchanged. Right   hilar adenopathy measures up to 2.0 x 1.6 cm. Left hilar adenopathy   measures up to 1.7 x 1.2 cm.  CHEST WALL AND LOWER NECK: Within normal limits.  VISUALIZED UPPER ABDOMEN: Pneumobilia, and small left hepatic cyst.   Distended stomach, right renal cyst. Abdominal findings are better   assessed at subsequent abdominal ultrasound.  BONES: Pectus excavatum. Degenerative changes.    IMPRESSION:  Noevidence of pulmonary embolism or pneumonia.    Bilateral hilar adenopathy is unchanged.    The upper abdominal findings, which are better evaluated at subsequent CT   abdomen/pelvis.    < end of copied text >    < from: CT Abdomen and Pelvis w/wo IV Cont (01.18.25 @ 09:46) >    IMPRESSION:  No active gastrointestinal bleed.    Status post Whipple procedure.    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    < end of copied text >  < from: CT Abdomen and Pelvis w/wo IV Cont (01.18.25 @ 09:46) >  FINDINGS:  LOWER CHEST: Within normal limits.    LIVER: Areas of steatosis in the left and right lobes. Scattered small   cysts.  BILE DUCTS: Normal caliber.Pneumobilia compatible with Whipple procedure.  GALLBLADDER: Cholecystectomy.  SPLEEN: Within normal limits.  PANCREAS: Status post Whipple procedure. Unremarkable pancreatic remnant.  ADRENALS: Within normal limits.  KIDNEYS/URETERS: Bilateral renal cysts.    BLADDER: Within normal limits.  REPRODUCTIVE ORGANS: Moderate prostatomegaly.    BOWEL: No bowel obstruction. Appendix not definitively visualized. No   active gastrointestinal bleed. Gastrojejunostomy as per Whipple procedure.  PERITONEUM/RETROPERITONEUM: Within normal limits.  VESSELS: Chronic splenic vein and severe focal stenosis of the   extrahepatic portal vein, with numerous upper abdominal collateral   vessels.  LYMPH NODES: No lymphadenopathy.  ABDOMINAL WALL: Within normal limits.  BONES: No acute findings.    IMPRESSION:  No active gastrointestinal bleed.    Status post Whipple procedure.    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      < end of copied text >

## 2025-01-18 NOTE — CONSULT NOTE ADULT - ASSESSMENT
A:  65 yo M who presents with syncopal /weakness likely 2/2 to LGIB. CT that demonstrates     Plan:   - F/U CT A/P with IV   - NGT decompression   - Resuscitation, trend lactate   - Hemodynamic monitoring   - Serial abdominal exams (benign)   -  Recommend GI consultation/evaluation  A:  65 yo M who presents with syncopal /weakness likely 2/2 to GIB, bright red bloody stools with blood noted within bowel lumen. Clinically non-obstructed.     Plan:   - CT  A/P with IV contrast- no active GIB, no bowel obstruction. Discussed with radiologist   - NGT decompression   - Resuscitation, trend lactate   - Hemodynamic monitoring   - Serial abdominal exams (benign)   -  Recommend GI consultation/evaluation   - Medical admission     Surgery will follow.  A:  65 yo M who presents with syncopal /weakness likely 2/2 to GIB, bright red bloody stools with blood noted within bowel lumen. Clinically non-obstructed.     Plan:   - CT  A/P with IV contrast, no bowel obstruction. Discussed with radiologist   - NGT decompression   - Resuscitation, trend lactate   - Hemodynamic monitoring   - Serial abdominal exams (benign)   -  Recommend GI consultation/evaluation   - Medical admission     Surgery will follow.

## 2025-01-18 NOTE — CONSULT NOTE ADULT - SUBJECTIVE AND OBJECTIVE BOX
Patient is a 64y old  Male who presents with a chief complaint of GI Bleeding  Symptomatic anemia (18 Jan 2025 17:06)    HPI:  64M with hx of HTN, HLD, Afib, prothrombin gene mutation on Pradaxa, hx of pancreatic cancer s/p Whipple/chemo/radiation/JACOB, hx of DVT/PE presented to the ED with episode of presyncope at home. Pt reports he was feeling weak over the last few days worsening today to where he was unable to keep standing and lowered himself to the floor. Pt denies LOC. He denies fevers/chills/known sick contacts. Endorses shortness of breath but no cough. He developed episodes of nausea this AM that has been occuring in waves but denies abdominal pain. Denies hematemesis and denies prior hx of GI bleeding. Reports he has not had prior colonoscopy. On arrival pt was noted to be hypotensive to 81/71. Pt was initially treated for sepsis and received fluid bolus with improvement. CT head/cervical spine neg for acute pathology. CT chest angio negative for PE but showed bilateral hilar adenopathy. CT Abd/pelvis demonstrated mildly dilated small bowel loops with concern for possible SBO and large amount of stool through the colon. Surgery was consulted and recommended repeat imaging w/ contrast. CT Abd/pelvis w/ contrast showed no active GI bleed or SBO but showed severe short segment stenosis of the portal vein and chronically occluded splenic vein. In ED pt had episodes of hematochezia and clots and was emergently transfused 2u pRBC and given Praxbind. Last dose Pradaxa was last night. Pt was noted to be anemic to 8.5 on presentation with HCO3 15, lactate 11.6. Pt remained hemodynamically stable while in the ED. Post transfusion Hgb improved to 9.6 and lactate downtrended to 2.7.                       PAST MEDICAL & SURGICAL HISTORY:  Hypertension      Paroxysmal atrial fibrillation      Lupus anticoagulant positive      Pancreatic cancer      H/O Whipple procedure      H/O left knee surgery      Medications:    nebivolol 10 milliGRAM(s) Oral daily      ondansetron Injectable 4 milliGRAM(s) IV Push once        pantoprazole  Injectable 40 milliGRAM(s) IV Push every 12 hours        Allergies: latex     Social: former smoker quit March 2023, denies EtOH abuse, denies illicit drug use     FH: non-contributory       ICU Vital Signs Last 24 Hrs  T(C): 36.9 (18 Jan 2025 19:44), Max: 37.2 (18 Jan 2025 17:07)  T(F): 98.5 (18 Jan 2025 19:44), Max: 98.9 (18 Jan 2025 17:07)  HR: 82 (18 Jan 2025 19:44) (69 - 89)  BP: 128/80 (18 Jan 2025 19:44) (81/71 - 134/85)  BP(mean): 92 (18 Jan 2025 16:30) (89 - 97)  ABP: --  ABP(mean): --  RR: 18 (18 Jan 2025 19:44) (14 - 18)  SpO2: 97% (18 Jan 2025 19:44) (97% - 100%)    O2 Parameters below as of 18 Jan 2025 19:44  Patient On (Oxygen Delivery Method): room air        LABS:                        9.6    29.53 )-----------( 261      ( 18 Jan 2025 11:30 )             30.0     01-18    142  |  109[H]  |  20.4[H]  ----------------------------<  133[H]  4.2   |  22.0  |  1.07    Ca    7.6[L]      18 Jan 2025 12:15    TPro  5.0[L]  /  Alb  2.9[L]  /  TBili  0.7  /  DBili  x   /  AST  75[H]  /  ALT  36  /  AlkPhos  122[H]  01-18        PT/INR - ( 18 Jan 2025 06:21 )   PT: 15.1 sec;   INR: 1.34 ratio         PTT - ( 18 Jan 2025 06:21 )  PTT:38.6 sec  Urinalysis Basic - ( 18 Jan 2025 12:15 )    Color: x / Appearance: x / SG: x / pH: x  Gluc: 133 mg/dL / Ketone: x  / Bili: x / Urobili: x   Blood: x / Protein: x / Nitrite: x   Leuk Esterase: x / RBC: x / WBC x   Sq Epi: x / Non Sq Epi: x / Bacteria: x      CULTURES:      Physical Examination:    General: awake, alert, in NAD       HEENT: NC/AT, anicteric, MMM    PULM: Clear to auscultation anteriorly, no accessory muscle use     NECK: Supple, trachea midline    CVS: S1S2, RRR    ABD: Soft, nondistended, nontender, normoactive bowel sounds, no rebound     EXT: No edema, nontender    SKIN: Warm and well perfused, no rashes noted    NEURO: Alert, oriented, interactive, nonfocal    ROS: negative except as per HPI     RADIOLOGY:   < from: CT Abdomen and Pelvis w/wo IV Cont (01.18.25 @ 09:46) >  FINDINGS:  LOWER CHEST: Within normal limits.    LIVER: Areas of steatosis in the left and right lobes. Scattered small   cysts.  BILE DUCTS: Normal caliber.Pneumobilia compatible with Whipple procedure.  GALLBLADDER: Cholecystectomy.  SPLEEN: Within normal limits.  PANCREAS: Status post Whipple procedure. Unremarkable pancreatic remnant.  ADRENALS: Within normal limits.  KIDNEYS/URETERS: Bilateral renal cysts.    BLADDER: Within normal limits.  REPRODUCTIVE ORGANS: Moderate prostatomegaly.    BOWEL: No bowel obstruction. Appendix not definitively visualized. No   active gastrointestinal bleed. Gastrojejunostomy as per Whipple procedure.  PERITONEUM/RETROPERITONEUM: Within normal limits.  VESSELS: Chronic splenic vein and severe focal stenosis of the   extrahepatic portal vein, with numerous upper abdominal collateral   vessels.  LYMPH NODES: No lymphadenopathy.  ABDOMINAL WALL: Within normal limits.  BONES: No acute findings.    < end of copied text >

## 2025-01-18 NOTE — ED PROVIDER NOTE - CLINICAL SUMMARY MEDICAL DECISION MAKING FREE TEXT BOX
On arrival hypotensive 80s systolic. Difficult to obtain good pulse ox, originally on 15L non rebreather for suspected hypoxia will transition to NC and reassess once patient is better perfused. CTA to assess for PE. Priority status called given recent unwitnessed fall. If patient does not improve with IVF low threshold to start pressors.

## 2025-01-18 NOTE — H&P ADULT - NSICDXPASTMEDICALHX_GEN_ALL_CORE_FT
PAST MEDICAL HISTORY:  Hypertension     Lupus anticoagulant positive     Pancreatic cancer     Paroxysmal atrial fibrillation

## 2025-01-18 NOTE — ED PROVIDER NOTE - PROGRESS NOTE DETAILS
Patient received in signout.   at time of signout patient had a large bloody bowel movement.  Pradaxa reversal ordered and 2 units packed red blood cells ordered. CT a without any active extravasation.  Repeat CAT scan also does not see small bowel obstruction.  Patient has been cleared by surgery.  Vital signs have remained stable without significant bleeding after initial episode.  H&H improved.  Lactate improved.  Seen by MICU who is cleared patient for admission to stepdown.  Admitted at this time and GI consult pending

## 2025-01-18 NOTE — H&P ADULT - NSICDXFAMILYHX_GEN_ALL_CORE_FT
FAMILY HISTORY:  FH: HTN (hypertension)    Mother  Still living? Unknown  FH: breast cancer, Age at diagnosis: Age Unknown    Sibling  Still living? Unknown  FH: kidney cancer, Age at diagnosis: Age Unknown

## 2025-01-18 NOTE — H&P ADULT - ASSESSMENT
64 year old male with hypertension, atrial fibrillation, lupus anticoagulant and pancreatic cancer s/p Whipple's, XRT and currently on chemotherapy which was held last month presents with dizziness, fatigue and exertional dyspnea when he awoke this morning associated with bloody bowel movements.  Hypotensive in ER responding to hydration; Anemia (Hgb 8.5) and PRBC x 2 in addition to Praxbind given.   Also received PipTaz, Acetaminophen and Pantoprazole 80mg IV    # Acute Blood Loss Anemia, Symptomatic  # GI Bleed, lower  - Admit to SDU  - Maintain 2 large bore IV  - Clear liquid diet  - Pantoprazole 40mg IV q12  - Monitor Hgb, transfuse to keep>7  - Anticoagulation on hold after reversal  - GI for colonoscopy    # Atrial Fibrillation  # Hypertension  - Low dose Bystolic with parameters for rate control; Hold home Amlodipine, HCTZ  - Anticoagulation on hold after reversal    # History of pancreatic cancer  - Follow up with [facility] post discharge    Intermittent Pneumatic Compression for VTE prophylaxis  Ambulate ad lb    Disposition - Pending clinical course, Colonoscopy                       64 year old male with hypertension, atrial fibrillation, lupus anticoagulant and pancreatic cancer s/p Whipple's, XRT and currently on chemotherapy which was held last month presents with dizziness, fatigue and exertional dyspnea when he awoke this morning associated with bloody bowel movements.  Hypotensive in ER responding to hydration; Anemia (Hgb 8.5) and PRBC x 2 in addition to Praxbind given.   Also received PipTaz, Acetaminophen and Pantoprazole 80mg IV    # Acute Blood Loss Anemia, Symptomatic  # GI Bleed, lower  - Admit to SDU  - Maintain 2 large bore IV  - Clear liquid diet  - Pantoprazole 40mg IV q12  - Monitor Hgb, transfuse to keep>7  - Anticoagulation on hold after reversal  - GI for colonoscopy    # Atrial Fibrillation  # Hypertension  - Bystolic with parameters for rate control; Hold home Amlodipine, HCTZ  - Anticoagulation on hold after reversal    # History of pancreatic cancer  - Follow up with [facility] post discharge    Intermittent Pneumatic Compression for VTE prophylaxis  Ambulate ad lb    Disposition - Pending clinical course, Colonoscopy    Discussed with patient and wife at bedside

## 2025-01-18 NOTE — ED ADULT TRIAGE NOTE - CHIEF COMPLAINT QUOTE
Patient BIBEMS s/p syncopal episode at home. Patient with hx of pancreatic CA. Hypotensive on arrival.

## 2025-01-18 NOTE — PATIENT PROFILE ADULT - FALL HARM RISK - UNIVERSAL INTERVENTIONS
Bed in lowest position, wheels locked, appropriate side rails in place/Call bell, personal items and telephone in reach/Instruct patient to call for assistance before getting out of bed or chair/Non-slip footwear when patient is out of bed/[place] to call system/Physically safe environment - no spills, clutter or unnecessary equipment/Purposeful Proactive Rounding/Room/bathroom lighting operational, light cord in reach

## 2025-01-19 LAB
ALBUMIN SERPL ELPH-MCNC: 3 G/DL — LOW (ref 3.3–5.2)
ALP SERPL-CCNC: 112 U/L — SIGNIFICANT CHANGE UP (ref 40–120)
ALT FLD-CCNC: 1209 U/L — HIGH
ANION GAP SERPL CALC-SCNC: 11 MMOL/L — SIGNIFICANT CHANGE UP (ref 5–17)
APPEARANCE UR: CLEAR — SIGNIFICANT CHANGE UP
AST SERPL-CCNC: 1282 U/L — HIGH
BACTERIA # UR AUTO: NEGATIVE /HPF — SIGNIFICANT CHANGE UP
BILIRUB SERPL-MCNC: 1.2 MG/DL — SIGNIFICANT CHANGE UP (ref 0.4–2)
BILIRUB UR-MCNC: NEGATIVE — SIGNIFICANT CHANGE UP
BUN SERPL-MCNC: 25.8 MG/DL — HIGH (ref 8–20)
CALCIUM SERPL-MCNC: 7.9 MG/DL — LOW (ref 8.4–10.5)
CAST: 4 /LPF — SIGNIFICANT CHANGE UP (ref 0–4)
CHLORIDE SERPL-SCNC: 108 MMOL/L — SIGNIFICANT CHANGE UP (ref 96–108)
CO2 SERPL-SCNC: 21 MMOL/L — LOW (ref 22–29)
COLOR SPEC: YELLOW — SIGNIFICANT CHANGE UP
CREAT SERPL-MCNC: 0.89 MG/DL — SIGNIFICANT CHANGE UP (ref 0.5–1.3)
DIFF PNL FLD: ABNORMAL
EGFR: 97 ML/MIN/1.73M2 — SIGNIFICANT CHANGE UP
GLUCOSE SERPL-MCNC: 138 MG/DL — HIGH (ref 70–99)
GLUCOSE UR QL: NEGATIVE MG/DL — SIGNIFICANT CHANGE UP
HCT VFR BLD CALC: 26.3 % — LOW (ref 39–50)
HCT VFR BLD CALC: 27 % — LOW (ref 39–50)
HGB BLD-MCNC: 8.5 G/DL — LOW (ref 13–17)
HGB BLD-MCNC: 8.5 G/DL — LOW (ref 13–17)
KETONES UR-MCNC: NEGATIVE MG/DL — SIGNIFICANT CHANGE UP
LACTATE SERPL-SCNC: 1.2 MMOL/L — SIGNIFICANT CHANGE UP (ref 0.5–2)
LEUKOCYTE ESTERASE UR-ACNC: NEGATIVE — SIGNIFICANT CHANGE UP
MCHC RBC-ENTMCNC: 24.9 PG — LOW (ref 27–34)
MCHC RBC-ENTMCNC: 25 PG — LOW (ref 27–34)
MCHC RBC-ENTMCNC: 31.5 G/DL — LOW (ref 32–36)
MCHC RBC-ENTMCNC: 32.3 G/DL — SIGNIFICANT CHANGE UP (ref 32–36)
MCV RBC AUTO: 77.4 FL — LOW (ref 80–100)
MCV RBC AUTO: 79.2 FL — LOW (ref 80–100)
NITRITE UR-MCNC: NEGATIVE — SIGNIFICANT CHANGE UP
PH UR: 5.5 — SIGNIFICANT CHANGE UP (ref 5–8)
PLATELET # BLD AUTO: 132 K/UL — LOW (ref 150–400)
PLATELET # BLD AUTO: 148 K/UL — LOW (ref 150–400)
POTASSIUM SERPL-MCNC: 4.1 MMOL/L — SIGNIFICANT CHANGE UP (ref 3.5–5.3)
POTASSIUM SERPL-SCNC: 4.1 MMOL/L — SIGNIFICANT CHANGE UP (ref 3.5–5.3)
PROT SERPL-MCNC: 4.9 G/DL — LOW (ref 6.6–8.7)
PROT UR-MCNC: 30 MG/DL
RAPID RVP RESULT: SIGNIFICANT CHANGE UP
RBC # BLD: 3.4 M/UL — LOW (ref 4.2–5.8)
RBC # BLD: 3.41 M/UL — LOW (ref 4.2–5.8)
RBC # FLD: 17.1 % — HIGH (ref 10.3–14.5)
RBC # FLD: 17.1 % — HIGH (ref 10.3–14.5)
RBC CASTS # UR COMP ASSIST: 0 /HPF — SIGNIFICANT CHANGE UP (ref 0–4)
SARS-COV-2 RNA SPEC QL NAA+PROBE: SIGNIFICANT CHANGE UP
SODIUM SERPL-SCNC: 140 MMOL/L — SIGNIFICANT CHANGE UP (ref 135–145)
SP GR SPEC: >1.03 — HIGH (ref 1–1.03)
SQUAMOUS # UR AUTO: 6 /HPF — HIGH (ref 0–5)
UROBILINOGEN FLD QL: 0.2 MG/DL — SIGNIFICANT CHANGE UP (ref 0.2–1)
WBC # BLD: 17.65 K/UL — HIGH (ref 3.8–10.5)
WBC # BLD: 17.66 K/UL — HIGH (ref 3.8–10.5)
WBC # FLD AUTO: 17.65 K/UL — HIGH (ref 3.8–10.5)
WBC # FLD AUTO: 17.66 K/UL — HIGH (ref 3.8–10.5)
WBC UR QL: 2 /HPF — SIGNIFICANT CHANGE UP (ref 0–5)

## 2025-01-19 PROCEDURE — 99231 SBSQ HOSP IP/OBS SF/LOW 25: CPT

## 2025-01-19 PROCEDURE — 99233 SBSQ HOSP IP/OBS HIGH 50: CPT

## 2025-01-19 PROCEDURE — 76700 US EXAM ABDOM COMPLETE: CPT | Mod: 26

## 2025-01-19 PROCEDURE — 99232 SBSQ HOSP IP/OBS MODERATE 35: CPT

## 2025-01-19 RX ORDER — ALPRAZOLAM 2 MG
0.25 TABLET ORAL ONCE
Refills: 0 | Status: DISCONTINUED | OUTPATIENT
Start: 2025-01-19 | End: 2025-01-19

## 2025-01-19 RX ORDER — ACETAMINOPHEN 160 MG/5ML
1000 SUSPENSION ORAL ONCE
Refills: 0 | Status: COMPLETED | OUTPATIENT
Start: 2025-01-19 | End: 2025-01-19

## 2025-01-19 RX ADMIN — PANTOPRAZOLE 40 MILLIGRAM(S): 20 TABLET, DELAYED RELEASE ORAL at 05:55

## 2025-01-19 RX ADMIN — Medication 0.25 MILLIGRAM(S): at 00:18

## 2025-01-19 RX ADMIN — ACETAMINOPHEN 400 MILLIGRAM(S): 160 SUSPENSION ORAL at 20:21

## 2025-01-19 RX ADMIN — NEBIVOLOL 10 MILLIGRAM(S): 10 TABLET ORAL at 05:55

## 2025-01-19 RX ADMIN — PANTOPRAZOLE 40 MILLIGRAM(S): 20 TABLET, DELAYED RELEASE ORAL at 18:03

## 2025-01-19 RX ADMIN — ACETAMINOPHEN 1000 MILLIGRAM(S): 160 SUSPENSION ORAL at 21:34

## 2025-01-19 NOTE — PROVIDER CONTACT NOTE (OTHER) - REASON
Patient LFTs with significant increase from previous
Patient requesting sleeping aid
Patient dislodged NGT
Patient NGT with no output
negative

## 2025-01-19 NOTE — PROGRESS NOTE ADULT - SUBJECTIVE AND OBJECTIVE BOX
Hospitalist Progress Note    Chief Complaint:  GI Bleed    SUBJECTIVE / OVERNIGHT EVENTS:  No events overnight, patient seen at bedside, in NAD, no new complaints today. Patient denies chest pain, SOB, abd pain, N/V, fever, chills, dysuria or any other complaints. All remainder ROS negative.     MEDICATIONS  (STANDING):  influenza   Vaccine 0.5 milliLiter(s) IntraMuscular once  nebivolol 10 milliGRAM(s) Oral daily  ondansetron Injectable 4 milliGRAM(s) IV Push once  pantoprazole  Injectable 40 milliGRAM(s) IV Push every 12 hours    MEDICATIONS  (PRN):        I&O's Summary      PHYSICAL EXAM:  Vital Signs Last 24 Hrs  T(C): 37.3 (19 Jan 2025 08:54), Max: 37.9 (19 Jan 2025 08:30)  T(F): 99.2 (19 Jan 2025 08:54), Max: 100.2 (19 Jan 2025 08:30)  HR: 82 (19 Jan 2025 08:30) (69 - 94)  BP: 138/86 (19 Jan 2025 08:30) (107/84 - 153/86)  BP(mean): 92 (18 Jan 2025 16:30) (92 - 97)  RR: 16 (19 Jan 2025 08:30) (16 - 18)  SpO2: 97% (19 Jan 2025 08:30) (95% - 100%)    Parameters below as of 19 Jan 2025 08:30  Patient On (Oxygen Delivery Method): room air        General: Middle aged male sitting up on stretcher, fatigued  HEENT:  Pale conjunctiva, anicteric sclera  NECK:  Supple  CVS: Irregular S1 S2  RESP:  Clear to auscultation bilaterally  GI:  Soft with healed scar. No tenderness  : No suprapubic tenderness  MSK:  Moves all extremities  CNS:  Awake, sleepy, oriented x 3. Fluent speech, no gross focal or global deficit appreciated  INTEG:  Warm skin  PSYCH:  Fair mood, fatigued and tired    LABS:                        8.5    17.65 )-----------( 148      ( 19 Jan 2025 03:48 )             26.3     01-19    140  |  108  |  25.8[H]  ----------------------------<  138[H]  4.1   |  21.0[L]  |  0.89    Ca    7.9[L]      19 Jan 2025 03:48    TPro  4.9[L]  /  Alb  3.0[L]  /  TBili  1.2  /  DBili  x   /  AST  1282[H]  /  ALT  1209[H]  /  AlkPhos  112  01-19    PT/INR - ( 18 Jan 2025 06:21 )   PT: 15.1 sec;   INR: 1.34 ratio         PTT - ( 18 Jan 2025 06:21 )  PTT:38.6 sec      Urinalysis Basic - ( 19 Jan 2025 03:56 )    Color: Yellow / Appearance: Clear / SG: >1.030 / pH: x  Gluc: x / Ketone: Negative mg/dL  / Bili: Negative / Urobili: 0.2 mg/dL   Blood: x / Protein: 30 mg/dL / Nitrite: Negative   Leuk Esterase: Negative / RBC: 0 /HPF / WBC 2 /HPF   Sq Epi: x / Non Sq Epi: 6 /HPF / Bacteria: Negative /HPF        CAPILLARY BLOOD GLUCOSE            RADIOLOGY & ADDITIONAL TESTS:  Results Reviewed: Y  Imaging Personally Reviewed: N  Electrocardiogram Personally Reviewed: RADHIKA

## 2025-01-19 NOTE — PROVIDER CONTACT NOTE (OTHER) - SITUATION
Patient morning labs resulted and AST/ALT resulted at 1282/1209 from previous of 75/36
Patient with NGT that is connected to low intermittent suction with no output.

## 2025-01-19 NOTE — PROVIDER CONTACT NOTE (OTHER) - ACTION/TREATMENT ORDERED:
ROQUE Velarde made aware and to come assess patient at bedside
ROQUE Velarde made aware
PA to order sleep aid, pending orders
ROQUE Velarde made aware

## 2025-01-19 NOTE — PROGRESS NOTE ADULT - ASSESSMENT
A:   65 yo M with hx of anticoagulation disorder/PE (on Pradaxa) , pancreatic ca s/p Whipple (2023) who presents with syncopal /weakness likely 2/2 to GIB.  Clinically non-obstructed. Not localized, however patient hemodynamically  well at this time.     Plan:  - Hemodynamic monitoring   - NGT decompression , monitor o/p in setting of GIB      Patient with bowel function, clinically non-obstructed   - GI following        If rebleed bloody NGT o/p , concern for UGIB should consider hemobilia workup /endoscopy       If LGIB , given CT that shows no  active bleed will need further localizaton   - No acute surgical intervention is indicated  at this time   - Remainder of care per primary team

## 2025-01-19 NOTE — PROGRESS NOTE ADULT - ASSESSMENT
64 year old male with hypertension, atrial fibrillation, lupus anticoagulant and pancreatic cancer s/p Whipple's, XRT and currently on chemotherapy which was held last month presents with dizziness, fatigue and exertional dyspnea when he awoke this morning associated with bloody bowel movements.  Hypotensive in ER responding to hydration; Anemia (Hgb 8.5) and PRBC x 2 in addition to Praxbind given.   Also received PipTaz, Acetaminophen and Pantoprazole 80mg IV    # Acute Blood Loss Anemia, Symptomatic  # GI Bleed, lower  - clinically improving, no further bleeding  - DG to tele  - Maintain 2 large bore IV  - Clear liquid diet  - Pantoprazole 40mg IV q12  - Monitor Hgb, transfuse to keep>7  - Anticoagulation on hold after reversal  - GI for colonoscopy, likely during the week    #Shock Liver  - trend CMP  - obtain US Abd  - if still elevated, obtain Hep panel    # Atrial Fibrillation  # Hypertension  - Bystolic with parameters for rate control; Hold home Amlodipine, HCTZ  - Anticoagulation on hold after reversal    # History of pancreatic cancer  - Follow up with [facility] post discharge    Intermittent Pneumatic Compression for VTE prophylaxis  Ambulate ad lb    Disposition - Pending clinical course, Colonoscopy    Discussed with patient at bedside

## 2025-01-19 NOTE — PROGRESS NOTE ADULT - ASSESSMENT
Mr. Dawn is a 64 year old gentleman with significant past medical history of pancreatic cancer following with [facility] s/p Whipple/chemo/RT currently on chemotherapy which has been held in the setting of dizziness/fatigue/exertional dyspnea/ anemia for which he was being followed closely, history of DVT in the setting of lupus anti-coagulant, paroxsymal atrial fibrillation and chronic Pradaxa for at least 20 years, who presents from home after being found down on the floor of his room, patient reported standing up and feeling light headed, did not lose consciousness although admits to dizziness, decided to sit down on the ground (slowly slid down to the floor) and then describes severe weakness for which he was unable to rise or to call for help, he was down for anywhere between 20-45 minutes per recall. He was found by his wife who phoned EMS to bring him to hospital. Previous to this event patient endorses feeling at his baseline level of health. He denies recent illness. Symptoms came on suddenly without warning. He then developed perfuse bloody bowel movements for which he presents to the ED. GI asked to consult for rectal bleeding.    #rectal bleed  CT Abdomen and Pelvis w/wo IV Cont (01.18.25) No active gastrointestinal bleed. Status post Whipple procedure. Severe short segment stenosis of the portal vein and chronically occluded splenic vein with numerous upper abdominal collateral vessels.  patient on Pradaxa; now reversed  slight down trend in hgb 9.5-->8.5  presented with elevated WBC and lactate of 11, more concerning for ischemia. Less likely diverticular bleeding, hemorrhoidal bleeding or angiectasia   -Trend CBC daily, bleeding seems to have resolved  -advance to full liquid diet   -no plans for urgent or emergent endoscopic procedures   -If clinical status continues to improve can perform colonoscopy as outpatient. If pt continues to have bleeding or he clinically deteriorates, will consider colonoscopy inpatient.   d/w pt and family at bedside    #elevated transaminase   AST/ALT: 1282/1209 2/2 ischemic hepatopathy  -Trend CMP daily     Further care per primary team   _________________________________________________________________  Assessment and recommendations are final when note is signed by the attending physician.

## 2025-01-19 NOTE — PROGRESS NOTE ADULT - SUBJECTIVE AND OBJECTIVE BOX
HPI/OVERNIGHT EVENTS:  NAEON. Patient HDS. No recurrent bloody BM . + flatus.  NGT in place.   Denies any abdominal pain. No additional complaints.     MEDICATIONS  (STANDING):  influenza   Vaccine 0.5 milliLiter(s) IntraMuscular once  nebivolol 10 milliGRAM(s) Oral daily  ondansetron Injectable 4 milliGRAM(s) IV Push once  pantoprazole  Injectable 40 milliGRAM(s) IV Push every 12 hours    MEDICATIONS  (PRN):      Vital Signs Last 24 Hrs  T(C): 37.1 (19 Jan 2025 00:30), Max: 37.2 (18 Jan 2025 17:07)  T(F): 98.8 (19 Jan 2025 00:30), Max: 98.9 (18 Jan 2025 17:07)  HR: 85 (19 Jan 2025 00:30) (69 - 89)  BP: 152/88 (19 Jan 2025 00:30) (81/71 - 152/88)  BP(mean): 92 (18 Jan 2025 16:30) (89 - 97)  RR: 16 (19 Jan 2025 00:30) (14 - 18)  SpO2: 95% (19 Jan 2025 00:30) (95% - 100%)    Parameters below as of 19 Jan 2025 00:30  Patient On (Oxygen Delivery Method): room air        Constitutional: patient resting comfortably in bed, in no acute distress  NGT , bllod tinged o/p   Respiratory: respirations are unlabored, no accessory muscle use, no conversational dyspnea  Gastrointestinal: Abdomen soft, non-tender, non-distended, no rebound tenderness / guarding  Neurological: A&O x 3      I&O's Detail      LABS:                        9.5    21.19 )-----------( 176      ( 18 Jan 2025 19:40 )             28.8     01-18    142  |  109[H]  |  20.4[H]  ----------------------------<  133[H]  4.2   |  22.0  |  1.07    Ca    7.6[L]      18 Jan 2025 12:15    TPro  5.0[L]  /  Alb  2.9[L]  /  TBili  0.7  /  DBili  x   /  AST  75[H]  /  ALT  36  /  AlkPhos  122[H]  01-18    PT/INR - ( 18 Jan 2025 06:21 )   PT: 15.1 sec;   INR: 1.34 ratio         PTT - ( 18 Jan 2025 06:21 )  PTT:38.6 sec  Urinalysis Basic - ( 18 Jan 2025 12:15 )    Color: x / Appearance: x / SG: x / pH: x  Gluc: 133 mg/dL / Ketone: x  / Bili: x / Urobili: x   Blood: x / Protein: x / Nitrite: x   Leuk Esterase: x / RBC: x / WBC x   Sq Epi: x / Non Sq Epi: x / Bacteria: x

## 2025-01-19 NOTE — PROGRESS NOTE ADULT - SUBJECTIVE AND OBJECTIVE BOX
Chief Complaint:  Patient is a 64y old  Male who presents with a chief complaint of GI Bleeding  Symptomatic anemia (19 Jan 2025 09:51)      HPI/ 24 hr events: Patient seen and examined at bedside. Pt feeling well this morning. Tolerating liquid diet. Last BM was yesterday afternoon. Denies nausea, vomiting, diarrhea, abdominal pain, hematemesis, hematochezia, melena. Pt febrile to 100.2 this AM, leukocytosis improving. Slight down trend in hgb from 9.5-->8.5. Jump in LFTs 2/2 ischemic hepatopathy.       REVIEW OF SYSTEMS:   General: Negative  HEENT: Negative  CV: Negative  Respiratory: Negative  GI: See HPI  : Negative  MSK: Negative  Hematologic: Negative  Skin: Negative    MEDICATIONS:   MEDICATIONS  (STANDING):  influenza   Vaccine 0.5 milliLiter(s) IntraMuscular once  nebivolol 10 milliGRAM(s) Oral daily  ondansetron Injectable 4 milliGRAM(s) IV Push once  pantoprazole  Injectable 40 milliGRAM(s) IV Push every 12 hours    MEDICATIONS  (PRN):      Packed Red Cells Order:  1 Unit  Indication: Anemia due to Active Hemorrhage - Medical Conditions e.  Infuse Unit : 1 Hour (01-18-25 @ 08:52)  Packed Red Cells Order:  1 Unit  Indication: Anemia due to Active Hemorrhage - Medical Conditions e.  Infuse Unit : 1 Hour (01-18-25 @ 08:52)        DIET:  Diet, Clear Liquid (01-18-25 @ 15:27) [Active]          ALLERGIES:   Allergies    No Known Drug Allergies  latex (Rash)    Intolerances        VITAL SIGNS:   Vital Signs Last 24 Hrs  T(C): 37.3 (19 Jan 2025 08:54), Max: 37.9 (19 Jan 2025 08:30)  T(F): 99.2 (19 Jan 2025 08:54), Max: 100.2 (19 Jan 2025 08:30)  HR: 82 (19 Jan 2025 08:30) (75 - 94)  BP: 138/86 (19 Jan 2025 08:30) (107/84 - 153/86)  BP(mean): 92 (18 Jan 2025 16:30) (92 - 97)  RR: 16 (19 Jan 2025 08:30) (16 - 18)  SpO2: 97% (19 Jan 2025 08:30) (95% - 98%)    Parameters below as of 19 Jan 2025 08:30  Patient On (Oxygen Delivery Method): room air      I&O's Summary      PHYSICAL EXAM:   GENERAL:  No acute distress  HEENT:  NC/AT, conjunctiva clear, sclera anicteric  CHEST:  No increased effort  HEART:  Regular rate  ABDOMEN:  Soft, non-tender, non-distended, normoactive bowel sounds, no rebound or guarding  EXTREMITIES: No edema  SKIN:  Warm, dry  NEURO:  Calm, cooperative    LABS:                        8.5    17.66 )-----------( 132      ( 19 Jan 2025 12:47 )             27.0     Hemoglobin: 8.5 g/dL (01-19-25 @ 12:47)  Hemoglobin: 8.5 g/dL (01-19-25 @ 03:48)  Hemoglobin: 9.5 g/dL (01-18-25 @ 19:40)  Hemoglobin: 9.6 g/dL (01-18-25 @ 11:30)  Hemoglobin: 8.5 g/dL (01-18-25 @ 06:21)    01-19    140  |  108  |  25.8[H]  ----------------------------<  138[H]  4.1   |  21.0[L]  |  0.89    Ca    7.9[L]      19 Jan 2025 03:48    TPro  4.9[L]  /  Alb  3.0[L]  /  TBili  1.2  /  DBili  x   /  AST  1282[H]  /  ALT  1209[H]  /  AlkPhos  112  01-19    LIVER FUNCTIONS - ( 19 Jan 2025 03:48 )  Alb: 3.0 g/dL / Pro: 4.9 g/dL / ALK PHOS: 112 U/L / ALT: 1209 U/L / AST: 1282 U/L / GGT: x             PT/INR - ( 18 Jan 2025 06:21 )   PT: 15.1 sec;   INR: 1.34 ratio         PTT - ( 18 Jan 2025 06:21 )  PTT:38.6 sec    Culture - Blood (collected 18 Jan 2025 06:21)  Source: .Blood BLOOD  Preliminary Report (19 Jan 2025 13:01):    No growth at 24 hours                                          RADIOLOGY & ADDITIONAL STUDIES:      ACC: [id number] EXAM:  CT ABDOMEN AND PELVIS WAW IC   ORDERED BY: MARIBETH PETERSENI     PROCEDURE DATE:  01/18/2025          INTERPRETATION:  CLINICAL INFORMATION: GI bleed, bloody bowel movements    ADDITIONAL CLINICAL INFORMATION: Other, Non-specified    COMPARISON: January 18, 2025    CONTRAST/COMPLICATIONS:  IV Contrast: Omnipaque 350  95 cc administered   5 cc discarded  Oral Contrast: NONE  .    PROCEDURE:  CT of the Abdomen and Pelvis was performed.  Precontrast, Arterial and Delayed phases were performed.  Sagittal and coronal reformats were performed.    FINDINGS:  LOWER CHEST: Within normal limits.    LIVER: Areas of steatosis in the left and right lobes. Scattered small   cysts.  BILE DUCTS: Normal caliber.Pneumobilia compatible with Whipple procedure.  GALLBLADDER: Cholecystectomy.  SPLEEN: Within normal limits.  PANCREAS: Status post Whipple procedure. Unremarkable pancreatic remnant.  ADRENALS: Within normal limits.  KIDNEYS/URETERS: Bilateral renal cysts.    BLADDER: Within normal limits.  REPRODUCTIVE ORGANS: Moderate prostatomegaly.    BOWEL: No bowel obstruction. Appendix not definitively visualized. No   active gastrointestinal bleed. Gastrojejunostomy as per Whipple procedure.  PERITONEUM/RETROPERITONEUM: Within normal limits.  VESSELS: Chronic splenic vein and severe focal stenosis of the   extrahepatic portal vein, with numerous upper abdominal collateral   vessels.  LYMPH NODES: No lymphadenopathy.  ABDOMINAL WALL: Within normal limits.  BONES: No acute findings.    IMPRESSION:  No active gastrointestinal bleed.    Status post Whipple procedure.    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        --- End of Report ---            KEN PRITCHARD MD; Attending Radiologist  This document has been electronically signed. Jan 18 2025 10:56AM  01-18-25 @ 09:46    ACC: [id number] EXAM:  CT ABDOMEN AND PELVIS   ORDERED BY: GABRIELLE GARCIA     PROCEDURE DATE:  01/18/2025          INTERPRETATION:  CLINICAL INFORMATION: History of pancreatic cancer   status post Whipple. Abdominal pain and nausea after syncopal episode and   fall.    COMPARISON: CT 3/10/2023    CONTRAST/COMPLICATIONS:  IV Contrast: None  Oral Contrast: None      PROCEDURE:  CT of the Abdomen and Pelvis was performed.  Sagittal and coronal reformats were performed.    FINDINGS: Evaluation of the solid organs and vasculature is limited in   the absence of intravenous contrast.    LOWER CHEST: Within normal limits.    LIVER: Normal morphology with scattered hepatic cysts and subcentimeter   additional hypodense lesions that are too small to characterize.  BILE DUCTS: Mild intrahepatic biliary ductal dilatation is similar to   prior.  GALLBLADDER: Cholecystectomy.  SPLEEN: Splenomegaly.  PANCREAS: Status post Whipple. The remnant pancreas is grossly   unremarkable without contrast.  ADRENALS: Within normal limits.  KIDNEYS/URETERS: Residual contrast from prior CTA chest. Bilateral renal   cysts and subcentimeter hypodense foci that are too small to   characterize. No hydronephrosis.    BLADDER: Within normal limits.  REPRODUCTIVE ORGANS: Enlarged prostate.    BOWEL: Distended stomach to the gastrojejunostomy. The colon is diffusely   distended with a large amount of stool. Fluid-filled, mildly dilated mid   to distal small bowel loops with decompressed terminal ileum, question   bowel obstruction. Appendix is normal.  PERITONEUM/RETROPERITONEUM: No pneumoperitoneum or ascites.  VESSELS: Atherosclerotic changes.  LYMPH NODES: No lymphadenopathy.  ABDOMINAL WALL: Within normal limits.  BONES: Degenerative changes.    IMPRESSION:  Mildly dilated mid to distal small bowel loops with decompressed terminal   ileum, raising the possibility of small bowel obstruction. This is not   well elucidated without oral and IV contrast.    Fluid-filled, markedly dilated stomach. Large amountof stool throughout   the colon.    Status post Whipple. Small hypodense liver lesions are too small to   characterize. Limited assessment for tumor or metastatic disease without   IV contrast.    --- End of Report ---            KAILEE DOMINGUEZ MD; Attending Radiologist  This document has been electronically signed. Jan 18 2025  7:58AM  01-18-25 @ 07:13      ACC: [id number] EXAM:  US ABDOMEN COMPLETE   ORDERED BY: TRINH CLANCY     PROCEDURE DATE:  01/19/2025          INTERPRETATION:  CLINICAL INFORMATION: Hepatic disease. GI hemorrhage    COMPARISON: CT 1/18/2025    TECHNIQUE: Sonography of the abdomen.    FINDINGS:  Liver: Heterogeneously increased hepatic echotexture. 2 cm subcapsular   hepatic cyst  Bile ducts: Normal caliber. Common bile duct measures 7.5 cm.  Gallbladder: Cholecystectomy.  Pancreas: Obscured  Spleen: 15 cm, Splenomegaly  Right kidney: 9 cm. No hydronephrosis. 7.5 x 6 cm upper pole cyst  Left kidney: 11.6 cm. No hydronephrosis. 16mm mid renal cyst  Ascites: None.  Aorta and IVC: Visualized portions are within normal limits.    IMPRESSION:  Heterogeneously increased hepatic echotexture, splenomegaly,   corresponding to recent CT        --- End of Report ---            JODI TAYLOR MD; Attending Radiologist  This document has been electronically signed. Jan 19 2025 12:10PM -- -- --     Chief Complaint:  Patient is a 64y old  Male who presents with a chief complaint of GI Bleeding  Symptomatic anemia (19 Jan 2025 09:51)      HPI/ 24 hr events: Patient seen and examined at bedside. Pt feeling well this morning. Tolerating liquid diet. Last BM was yesterday afternoon. Denies nausea, vomiting, diarrhea, abdominal pain, hematemesis, hematochezia, melena. Pt febrile to 100.2 this AM, leukocytosis improving. Slight down trend in hgb from 9.5-->8.5. Jump in LFTs 2/2 ischemic hepatopathy.       REVIEW OF SYSTEMS:   General: Negative  HEENT: Negative  CV: Negative  Respiratory: Negative  GI: See HPI  : Negative  MSK: Negative  Hematologic: Negative  Skin: Negative    MEDICATIONS:   MEDICATIONS  (STANDING):  influenza   Vaccine 0.5 milliLiter(s) IntraMuscular once  nebivolol 10 milliGRAM(s) Oral daily  ondansetron Injectable 4 milliGRAM(s) IV Push once  pantoprazole  Injectable 40 milliGRAM(s) IV Push every 12 hours    MEDICATIONS  (PRN):      Packed Red Cells Order:  1 Unit  Indication: Anemia due to Active Hemorrhage - Medical Conditions e.  Infuse Unit : 1 Hour (01-18-25 @ 08:52)  Packed Red Cells Order:  1 Unit  Indication: Anemia due to Active Hemorrhage - Medical Conditions e.  Infuse Unit : 1 Hour (01-18-25 @ 08:52)        DIET:  Diet, Clear Liquid (01-18-25 @ 15:27) [Active]          ALLERGIES:   Allergies    No Known Drug Allergies  latex (Rash)    Intolerances        VITAL SIGNS:   Vital Signs Last 24 Hrs  T(C): 37.3 (19 Jan 2025 08:54), Max: 37.9 (19 Jan 2025 08:30)  T(F): 99.2 (19 Jan 2025 08:54), Max: 100.2 (19 Jan 2025 08:30)  HR: 82 (19 Jan 2025 08:30) (75 - 94)  BP: 138/86 (19 Jan 2025 08:30) (107/84 - 153/86)  BP(mean): 92 (18 Jan 2025 16:30) (92 - 97)  RR: 16 (19 Jan 2025 08:30) (16 - 18)  SpO2: 97% (19 Jan 2025 08:30) (95% - 98%)    Parameters below as of 19 Jan 2025 08:30  Patient On (Oxygen Delivery Method): room air      I&O's Summary      PHYSICAL EXAM:   GENERAL:  No acute distress  HEENT:  NC/AT, conjunctiva clear, sclera anicteric  CHEST:  No increased effort  HEART:  Regular rate  ABDOMEN:  Soft, non-tender, non-distended, normoactive bowel sounds, no rebound or guarding  EXTREMITIES: No edema  SKIN:  Warm, dry  NEURO:  Calm, cooperative    LABS:                        8.5    17.66 )-----------( 132      ( 19 Jan 2025 12:47 )             27.0     Hemoglobin: 8.5 g/dL (01-19-25 @ 12:47)  Hemoglobin: 8.5 g/dL (01-19-25 @ 03:48)  Hemoglobin: 9.5 g/dL (01-18-25 @ 19:40)  Hemoglobin: 9.6 g/dL (01-18-25 @ 11:30)  Hemoglobin: 8.5 g/dL (01-18-25 @ 06:21)    01-19    140  |  108  |  25.8[H]  ----------------------------<  138[H]  4.1   |  21.0[L]  |  0.89    Ca    7.9[L]      19 Jan 2025 03:48    TPro  4.9[L]  /  Alb  3.0[L]  /  TBili  1.2  /  DBili  x   /  AST  1282[H]  /  ALT  1209[H]  /  AlkPhos  112  01-19    LIVER FUNCTIONS - ( 19 Jan 2025 03:48 )  Alb: 3.0 g/dL / Pro: 4.9 g/dL / ALK PHOS: 112 U/L / ALT: 1209 U/L / AST: 1282 U/L / GGT: x             PT/INR - ( 18 Jan 2025 06:21 )   PT: 15.1 sec;   INR: 1.34 ratio         PTT - ( 18 Jan 2025 06:21 )  PTT:38.6 sec    Culture - Blood (collected 18 Jan 2025 06:21)  Source: .Blood BLOOD  Preliminary Report (19 Jan 2025 13:01):    No growth at 24 hours                                          RADIOLOGY & ADDITIONAL STUDIES:      ACC: [id number] EXAM:  CT ABDOMEN AND PELVIS WAW IC   ORDERED BY: MARIBETH PETERSENI     PROCEDURE DATE:  01/18/2025          INTERPRETATION:  CLINICAL INFORMATION: GI bleed, bloody bowel movements    ADDITIONAL CLINICAL INFORMATION: Other, Non-specified    COMPARISON: January 18, 2025    CONTRAST/COMPLICATIONS:  IV Contrast: Omnipaque 350  95 cc administered   5 cc discarded  Oral Contrast: NONE  .    PROCEDURE:  CT of the Abdomen and Pelvis was performed.  Precontrast, Arterial and Delayed phases were performed.  Sagittal and coronal reformats were performed.    FINDINGS:  LOWER CHEST: Within normal limits.    LIVER: Areas of steatosis in the left and right lobes. Scattered small   cysts.  BILE DUCTS: Normal caliber.Pneumobilia compatible with Whipple procedure.  GALLBLADDER: Cholecystectomy.  SPLEEN: Within normal limits.  PANCREAS: Status post Whipple procedure. Unremarkable pancreatic remnant.  ADRENALS: Within normal limits.  KIDNEYS/URETERS: Bilateral renal cysts.    BLADDER: Within normal limits.  REPRODUCTIVE ORGANS: Moderate prostatomegaly.    BOWEL: No bowel obstruction. Appendix not definitively visualized. No   active gastrointestinal bleed. Gastrojejunostomy as per Whipple procedure.  PERITONEUM/RETROPERITONEUM: Within normal limits.  VESSELS: Chronic splenic vein and severe focal stenosis of the   extrahepatic portal vein, with numerous upper abdominal collateral   vessels.  LYMPH NODES: No lymphadenopathy.  ABDOMINAL WALL: Within normal limits.  BONES: No acute findings.    IMPRESSION:  No active gastrointestinal bleed.    Status post Whipple procedure.    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        --- End of Report ---            KEN PRITCHARD MD; Attending Radiologist  This document has been electronically signed. Jan 18 2025 10:56AM  01-18-25 @ 09:46    ACC: [id number] EXAM:  CT ABDOMEN AND PELVIS   ORDERED BY: GABRIELLE GARCIA     PROCEDURE DATE:  01/18/2025          INTERPRETATION:  CLINICAL INFORMATION: History of pancreatic cancer   status post Whipple. Abdominal pain and nausea after syncopal episode and   fall.    COMPARISON: CT 3/10/2023    CONTRAST/COMPLICATIONS:  IV Contrast: None  Oral Contrast: None      PROCEDURE:  CT of the Abdomen and Pelvis was performed.  Sagittal and coronal reformats were performed.    FINDINGS: Evaluation of the solid organs and vasculature is limited in   the absence of intravenous contrast.    LOWER CHEST: Within normal limits.    LIVER: Normal morphology with scattered hepatic cysts and subcentimeter   additional hypodense lesions that are too small to characterize.  BILE DUCTS: Mild intrahepatic biliary ductal dilatation is similar to   prior.  GALLBLADDER: Cholecystectomy.  SPLEEN: Splenomegaly.  PANCREAS: Status post Whipple. The remnant pancreas is grossly   unremarkable without contrast.  ADRENALS: Within normal limits.  KIDNEYS/URETERS: Residual contrast from prior CTA chest. Bilateral renal   cysts and subcentimeter hypodense foci that are too small to   characterize. No hydronephrosis.    BLADDER: Within normal limits.  REPRODUCTIVE ORGANS: Enlarged prostate.    BOWEL: Distended stomach to the gastrojejunostomy. The colon is diffusely   distended with a large amount of stool. Fluid-filled, mildly dilated mid   to distal small bowel loops with decompressed terminal ileum, question   bowel obstruction. Appendix is normal.  PERITONEUM/RETROPERITONEUM: No pneumoperitoneum or ascites.  VESSELS: Atherosclerotic changes.  LYMPH NODES: No lymphadenopathy.  ABDOMINAL WALL: Within normal limits.  BONES: Degenerative changes.    IMPRESSION:  Mildly dilated mid to distal small bowel loops with decompressed terminal   ileum, raising the possibility of small bowel obstruction. This is not   well elucidated without oral and IV contrast.    Fluid-filled, markedly dilated stomach. Large amountof stool throughout   the colon.    Status post Whipple. Small hypodense liver lesions are too small to   characterize. Limited assessment for tumor or metastatic disease without   IV contrast.    --- End of Report ---            KAILEE DOMINGUEZ MD; Attending Radiologist  This document has been electronically signed. Jan 18 2025  7:58AM  01-18-25 @ 07:13      ACC: [id number] EXAM:  US ABDOMEN COMPLETE   ORDERED BY: TRINH CLANCY     PROCEDURE DATE:  01/19/2025          INTERPRETATION:  CLINICAL INFORMATION: Hepatic disease. GI hemorrhage    COMPARISON: CT 1/18/2025    TECHNIQUE: Sonography of the abdomen.    FINDINGS:  Liver: Heterogeneously increased hepatic echotexture. 2 cm subcapsular   hepatic cyst  Bile ducts: Normal caliber. Common bile duct measures 7.5 cm.  Gallbladder: Cholecystectomy.  Pancreas: Obscured  Spleen: 15 cm, Splenomegaly  Right kidney: 9 cm. No hydronephrosis. 7.5 x 6 cm upper pole cyst  Left kidney: 11.6 cm. No hydronephrosis. 16mm mid renal cyst  Ascites: None.  Aorta and IVC: Visualized portions are within normal limits.    IMPRESSION:  Heterogeneously increased hepatic echotexture, splenomegaly,   corresponding to recent CT        --- End of Report ---            JODI TAYLOR MD; Attending Radiologist  This document has been electronically signed. Jan 19 2025 12:10PM -- -- --

## 2025-01-19 NOTE — PROVIDER CONTACT NOTE (OTHER) - BACKGROUND
Patient admitted for suspected SBO and NGT placed in ED.
Patient admitted for suspected SBO. Denies any abdominal discomfort/pain and no acute distress at this time

## 2025-01-20 DIAGNOSIS — R74.8 ABNORMAL LEVELS OF OTHER SERUM ENZYMES: ICD-10-CM

## 2025-01-20 LAB
ALBUMIN SERPL ELPH-MCNC: 2.9 G/DL — LOW (ref 3.3–5.2)
ALP SERPL-CCNC: 106 U/L — SIGNIFICANT CHANGE UP (ref 40–120)
ALT FLD-CCNC: 1259 U/L — HIGH
ANION GAP SERPL CALC-SCNC: 11 MMOL/L — SIGNIFICANT CHANGE UP (ref 5–17)
ANISOCYTOSIS BLD QL: SIGNIFICANT CHANGE UP
AST SERPL-CCNC: 910 U/L — HIGH
BASOPHILS # BLD AUTO: 0 K/UL — SIGNIFICANT CHANGE UP (ref 0–0.2)
BASOPHILS NFR BLD AUTO: 0 % — SIGNIFICANT CHANGE UP (ref 0–2)
BILIRUB SERPL-MCNC: 1.6 MG/DL — SIGNIFICANT CHANGE UP (ref 0.4–2)
BUN SERPL-MCNC: 12.7 MG/DL — SIGNIFICANT CHANGE UP (ref 8–20)
CALCIUM SERPL-MCNC: 7.8 MG/DL — LOW (ref 8.4–10.5)
CHLORIDE SERPL-SCNC: 102 MMOL/L — SIGNIFICANT CHANGE UP (ref 96–108)
CO2 SERPL-SCNC: 25 MMOL/L — SIGNIFICANT CHANGE UP (ref 22–29)
CREAT SERPL-MCNC: 0.66 MG/DL — SIGNIFICANT CHANGE UP (ref 0.5–1.3)
CULTURE RESULTS: NO GROWTH — SIGNIFICANT CHANGE UP
DACRYOCYTES BLD QL SMEAR: SLIGHT — SIGNIFICANT CHANGE UP
EGFR: 106 ML/MIN/1.73M2 — SIGNIFICANT CHANGE UP
ELLIPTOCYTES BLD QL SMEAR: SLIGHT — SIGNIFICANT CHANGE UP
EOSINOPHIL # BLD AUTO: 0 K/UL — SIGNIFICANT CHANGE UP (ref 0–0.5)
EOSINOPHIL NFR BLD AUTO: 0 % — SIGNIFICANT CHANGE UP (ref 0–6)
GIANT PLATELETS BLD QL SMEAR: PRESENT — SIGNIFICANT CHANGE UP
GLUCOSE SERPL-MCNC: 172 MG/DL — HIGH (ref 70–99)
HCT VFR BLD CALC: 21.4 % — LOW (ref 39–50)
HCT VFR BLD CALC: 22.3 % — LOW (ref 39–50)
HCT VFR BLD CALC: 23.1 % — LOW (ref 39–50)
HGB BLD-MCNC: 7 G/DL — CRITICAL LOW (ref 13–17)
HGB BLD-MCNC: 7.2 G/DL — LOW (ref 13–17)
HGB BLD-MCNC: 7.5 G/DL — LOW (ref 13–17)
LYMPHOCYTES # BLD AUTO: 0.45 K/UL — LOW (ref 1–3.3)
LYMPHOCYTES # BLD AUTO: 4.4 % — LOW (ref 13–44)
MAGNESIUM SERPL-MCNC: 1.6 MG/DL — SIGNIFICANT CHANGE UP (ref 1.6–2.6)
MANUAL SMEAR VERIFICATION: SIGNIFICANT CHANGE UP
MCHC RBC-ENTMCNC: 25.3 PG — LOW (ref 27–34)
MCHC RBC-ENTMCNC: 25.5 PG — LOW (ref 27–34)
MCHC RBC-ENTMCNC: 25.5 PG — LOW (ref 27–34)
MCHC RBC-ENTMCNC: 32.3 G/DL — SIGNIFICANT CHANGE UP (ref 32–36)
MCHC RBC-ENTMCNC: 32.5 G/DL — SIGNIFICANT CHANGE UP (ref 32–36)
MCHC RBC-ENTMCNC: 32.7 G/DL — SIGNIFICANT CHANGE UP (ref 32–36)
MCV RBC AUTO: 78.1 FL — LOW (ref 80–100)
MCV RBC AUTO: 78.2 FL — LOW (ref 80–100)
MCV RBC AUTO: 78.6 FL — LOW (ref 80–100)
MICROCYTES BLD QL: SIGNIFICANT CHANGE UP
MONOCYTES # BLD AUTO: 0.17 K/UL — SIGNIFICANT CHANGE UP (ref 0–0.9)
MONOCYTES NFR BLD AUTO: 1.7 % — LOW (ref 2–14)
NEUTROPHILS # BLD AUTO: 9.4 K/UL — HIGH (ref 1.8–7.4)
NEUTROPHILS NFR BLD AUTO: 92.1 % — HIGH (ref 43–77)
NRBC # BLD: 1 /100 WBCS — HIGH (ref 0–0)
NRBC BLD-RTO: 1 /100 WBCS — HIGH (ref 0–0)
OVALOCYTES BLD QL SMEAR: SIGNIFICANT CHANGE UP
PHOSPHATE SERPL-MCNC: 1.8 MG/DL — LOW (ref 2.4–4.7)
PLAT MORPH BLD: NORMAL — SIGNIFICANT CHANGE UP
PLATELET # BLD AUTO: 64 K/UL — LOW (ref 150–400)
PLATELET # BLD AUTO: 69 K/UL — LOW (ref 150–400)
PLATELET # BLD AUTO: 74 K/UL — LOW (ref 150–400)
POIKILOCYTOSIS BLD QL AUTO: SIGNIFICANT CHANGE UP
POLYCHROMASIA BLD QL SMEAR: SLIGHT — SIGNIFICANT CHANGE UP
POTASSIUM SERPL-MCNC: 3.6 MMOL/L — SIGNIFICANT CHANGE UP (ref 3.5–5.3)
POTASSIUM SERPL-SCNC: 3.6 MMOL/L — SIGNIFICANT CHANGE UP (ref 3.5–5.3)
PROT SERPL-MCNC: 5.1 G/DL — LOW (ref 6.6–8.7)
RBC # BLD: 2.74 M/UL — LOW (ref 4.2–5.8)
RBC # BLD: 2.85 M/UL — LOW (ref 4.2–5.8)
RBC # BLD: 2.94 M/UL — LOW (ref 4.2–5.8)
RBC # FLD: 16.5 % — HIGH (ref 10.3–14.5)
RBC # FLD: 16.7 % — HIGH (ref 10.3–14.5)
RBC # FLD: 17 % — HIGH (ref 10.3–14.5)
RBC BLD AUTO: ABNORMAL
SODIUM SERPL-SCNC: 138 MMOL/L — SIGNIFICANT CHANGE UP (ref 135–145)
SPECIMEN SOURCE: SIGNIFICANT CHANGE UP
VARIANT LYMPHS # BLD: 1.8 % — SIGNIFICANT CHANGE UP (ref 0–6)
VARIANT LYMPHS NFR BLD MANUAL: 1.8 % — SIGNIFICANT CHANGE UP (ref 0–6)
WBC # BLD: 10.21 K/UL — SIGNIFICANT CHANGE UP (ref 3.8–10.5)
WBC # BLD: 10.4 K/UL — SIGNIFICANT CHANGE UP (ref 3.8–10.5)
WBC # BLD: 11.13 K/UL — HIGH (ref 3.8–10.5)
WBC # FLD AUTO: 10.21 K/UL — SIGNIFICANT CHANGE UP (ref 3.8–10.5)
WBC # FLD AUTO: 10.4 K/UL — SIGNIFICANT CHANGE UP (ref 3.8–10.5)
WBC # FLD AUTO: 11.13 K/UL — HIGH (ref 3.8–10.5)

## 2025-01-20 PROCEDURE — 99233 SBSQ HOSP IP/OBS HIGH 50: CPT

## 2025-01-20 PROCEDURE — 99232 SBSQ HOSP IP/OBS MODERATE 35: CPT

## 2025-01-20 RX ORDER — MAGNESIUM OXIDE 400 MG
400 TABLET ORAL
Refills: 0 | Status: DISCONTINUED | OUTPATIENT
Start: 2025-01-20 | End: 2025-01-24

## 2025-01-20 RX ORDER — ACETAMINOPHEN 160 MG/5ML
650 SUSPENSION ORAL ONCE
Refills: 0 | Status: COMPLETED | OUTPATIENT
Start: 2025-01-20 | End: 2025-01-20

## 2025-01-20 RX ORDER — SODIUM PHOSPHATE, DIBASIC, ANHYDROUS, POTASSIUM PHOSPHATE, MONOBASIC, AND SODIUM PHOSPHATE, MONOBASIC, MONOHYDRATE 852; 155; 130 MG/1; MG/1; MG/1
2 TABLET, COATED ORAL
Refills: 0 | Status: DISCONTINUED | OUTPATIENT
Start: 2025-01-20 | End: 2025-01-24

## 2025-01-20 RX ADMIN — ACETAMINOPHEN 650 MILLIGRAM(S): 160 SUSPENSION ORAL at 08:03

## 2025-01-20 RX ADMIN — SODIUM PHOSPHATE, DIBASIC, ANHYDROUS, POTASSIUM PHOSPHATE, MONOBASIC, AND SODIUM PHOSPHATE, MONOBASIC, MONOHYDRATE 2 PACKET(S): 852; 155; 130 TABLET, COATED ORAL at 10:19

## 2025-01-20 RX ADMIN — SODIUM PHOSPHATE, DIBASIC, ANHYDROUS, POTASSIUM PHOSPHATE, MONOBASIC, AND SODIUM PHOSPHATE, MONOBASIC, MONOHYDRATE 2 PACKET(S): 852; 155; 130 TABLET, COATED ORAL at 13:22

## 2025-01-20 RX ADMIN — Medication 400 MILLIGRAM(S): at 10:19

## 2025-01-20 RX ADMIN — ACETAMINOPHEN 650 MILLIGRAM(S): 160 SUSPENSION ORAL at 07:45

## 2025-01-20 RX ADMIN — SODIUM PHOSPHATE, DIBASIC, ANHYDROUS, POTASSIUM PHOSPHATE, MONOBASIC, AND SODIUM PHOSPHATE, MONOBASIC, MONOHYDRATE 2 PACKET(S): 852; 155; 130 TABLET, COATED ORAL at 18:00

## 2025-01-20 RX ADMIN — PANTOPRAZOLE 40 MILLIGRAM(S): 20 TABLET, DELAYED RELEASE ORAL at 06:20

## 2025-01-20 RX ADMIN — PANTOPRAZOLE 40 MILLIGRAM(S): 20 TABLET, DELAYED RELEASE ORAL at 18:00

## 2025-01-20 RX ADMIN — NEBIVOLOL 10 MILLIGRAM(S): 10 TABLET ORAL at 06:20

## 2025-01-20 RX ADMIN — Medication 400 MILLIGRAM(S): at 18:00

## 2025-01-20 RX ADMIN — Medication 400 MILLIGRAM(S): at 13:22

## 2025-01-20 NOTE — PROGRESS NOTE ADULT - SUBJECTIVE AND OBJECTIVE BOX
Patient is a 64y old  Male who presents with a chief complaint of GI Bleeding  Symptomatic anemia (20 Jan 2025 02:47)      HPI:  64 year old male with hypertension, atrial fibrillation, lupus anticoagulant and pancreatic cancer s/p Whipple's, XRT and currently on chemotherapy which was held last month .  Patient  dropped to 7.2.  NO rectal bleeding, no melena  no abdominal pain. LFT had increased to 1200 range, now decreased.  Had low grade fever 100.8    REVIEW OF SYSTEMS:  Constitutional: No fever, weight loss or fatigue  ENMT:  No difficulty hearing, tinnitus, vertigo; No sinus or throat pain  Respiratory: No cough, wheezing, chills or hemoptysis  Cardiovascular: No chest pain, palpitations, dizziness or leg swelling  Gastrointestinal: No abdominal or epigastric pain. No nausea, vomiting or hematemesis; No diarrhea or constipation. No melena or hematochezia.  Skin: No itching, burning, rashes or lesions   Musculoskeletal: No joint pain or swelling; No muscle, back or extremity pain    PAST MEDICAL & SURGICAL HISTORY:  Hypertension      Paroxysmal atrial fibrillation      Lupus anticoagulant positive      Pancreatic cancer      H/O Whipple procedure      H/O left knee surgery          FAMILY HISTORY:  FH: HTN (hypertension)    FH: breast cancer (Mother)    FH: kidney cancer (Sibling)        SOCIAL HISTORY:  Smoking Status: [ ] Current, [ ] Former, [ ] Never  Pack Years:  [  ] EtOH-no  [  ] IVDA    MEDICATIONS:  MEDICATIONS  (STANDING):  influenza   Vaccine 0.5 milliLiter(s) IntraMuscular once  magnesium oxide 400 milliGRAM(s) Oral three times a day with meals  nebivolol 10 milliGRAM(s) Oral daily  ondansetron Injectable 4 milliGRAM(s) IV Push once  pantoprazole  Injectable 40 milliGRAM(s) IV Push every 12 hours  potassium phosphate / sodium phosphate Powder (PHOS-NaK) 2 Packet(s) Oral three times a day with meals    MEDICATIONS  (PRN):      Allergies    No Known Drug Allergies  latex (Rash)    Intolerances        Vital Signs Last 24 Hrs  T(C): 37.1 (20 Jan 2025 08:03), Max: 38.9 (19 Jan 2025 19:10)  T(F): 98.7 (20 Jan 2025 08:03), Max: 102 (19 Jan 2025 19:10)  HR: 67 (20 Jan 2025 08:03) (67 - 87)  BP: 128/72 (20 Jan 2025 08:03) (128/72 - 161/81)  BP(mean): --  RR: 18 (20 Jan 2025 08:03) (18 - 18)  SpO2: 93% (20 Jan 2025 08:03) (93% - 96%)    Parameters below as of 20 Jan 2025 08:03  Patient On (Oxygen Delivery Method): room air            PHYSICAL EXAM:    General: ; in no acute distress  HEENT: MMM, conjunctiva and sclera clear  H-RRR  L-CTA  Gastrointestinal: Soft, non-tender non-distended; Normal bowel sounds; No rebound or guarding  Extremities: Normal range of motion, No clubbing, cyanosis or edema  Neurological: Alert and oriented x3  Skin: Warm and dry. No obvious rash      LABS:                        7.2    11.13 )-----------( 74       ( 20 Jan 2025 08:01 )             22.3     20 Jan 2025 06:34    138    |  102    |  12.7   ----------------------------<  172    3.6     |  25.0   |  0.66     Ca    7.8        20 Jan 2025 06:34  Phos  1.8       20 Jan 2025 06:34  Mg     1.6       20 Jan 2025 06:34    TPro  5.1    /  Alb  2.9    /  TBili  1.6    /  DBili  x      /  AST  910    /  ALT  1259   /  AlkPhos  106    / Amylase x      /Lipase x      20 Jan 2025 06:34              RADIOLOGY & ADDITIONAL STUDIES:   < from: CT Abdomen and Pelvis w/wo IV Cont (01.18.25 @ 09:46) >  IMPRESSION:  No active gastrointestinal bleed.    Status post Whipple procedure.    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      < end of copied text >

## 2025-01-20 NOTE — PROGRESS NOTE ADULT - SUBJECTIVE AND OBJECTIVE BOX
Spoke with OTONIEL Rivera in L&D. Will clear patient and then send her up for baby monitoring    Hospitalist Progress Note    Chief Complaint:  GI Bleed    SUBJECTIVE / OVERNIGHT EVENTS:  No events overnight, patient seen at bedside, in NAD, no new complaints today. Patient denies chest pain, SOB, abd pain, N/V, fever, chills, dysuria or any other complaints. All remainder ROS negative.     MEDICATIONS  (STANDING):  influenza   Vaccine 0.5 milliLiter(s) IntraMuscular once  magnesium oxide 400 milliGRAM(s) Oral three times a day with meals  nebivolol 10 milliGRAM(s) Oral daily  ondansetron Injectable 4 milliGRAM(s) IV Push once  pantoprazole  Injectable 40 milliGRAM(s) IV Push every 12 hours  potassium phosphate / sodium phosphate Powder (PHOS-NaK) 2 Packet(s) Oral three times a day with meals    MEDICATIONS  (PRN):        I&O's Summary      PHYSICAL EXAM:  Vital Signs Last 24 Hrs  T(C): 37.1 (20 Jan 2025 08:03), Max: 38.9 (19 Jan 2025 19:10)  T(F): 98.7 (20 Jan 2025 08:03), Max: 102 (19 Jan 2025 19:10)  HR: 67 (20 Jan 2025 08:03) (67 - 87)  BP: 128/72 (20 Jan 2025 08:03) (128/72 - 161/81)  BP(mean): --  RR: 18 (20 Jan 2025 08:03) (18 - 18)  SpO2: 93% (20 Jan 2025 08:03) (93% - 96%)    Parameters below as of 20 Jan 2025 08:03  Patient On (Oxygen Delivery Method): room air        General: Middle aged male sitting up on stretcher, fatigued  HEENT:  Pale conjunctiva, anicteric sclera  NECK:  Supple  CVS: Irregular S1 S2  RESP:  Clear to auscultation bilaterally  GI:  Soft with healed scar. No tenderness  : No suprapubic tenderness  MSK:  Moves all extremities  CNS:  Awake, sleepy, oriented x 3. Fluent speech, no gross focal or global deficit appreciated  INTEG:  Warm skin  PSYCH:  Fair mood, fatigued and tired    LABS:                        7.2    11.13 )-----------( 74       ( 20 Jan 2025 08:01 )             22.3     01-20    138  |  102  |  12.7  ----------------------------<  172[H]  3.6   |  25.0  |  0.66    Ca    7.8[L]      20 Jan 2025 06:34  Phos  1.8     01-20  Mg     1.6     01-20    TPro  5.1[L]  /  Alb  2.9[L]  /  TBili  1.6  /  DBili  x   /  AST  910[H]  /  ALT  1259[H]  /  AlkPhos  106  01-20          Urinalysis Basic - ( 20 Jan 2025 06:34 )    Color: x / Appearance: x / SG: x / pH: x  Gluc: 172 mg/dL / Ketone: x  / Bili: x / Urobili: x   Blood: x / Protein: x / Nitrite: x   Leuk Esterase: x / RBC: x / WBC x   Sq Epi: x / Non Sq Epi: x / Bacteria: x        Culture - Urine (collected 19 Jan 2025 03:56)  Source: Clean Catch Clean Catch (Midstream)  Final Report (20 Jan 2025 10:22):    No growth    Culture - Blood (collected 18 Jan 2025 06:21)  Source: .Blood BLOOD  Preliminary Report (19 Jan 2025 13:01):    No growth at 24 hours      CAPILLARY BLOOD GLUCOSE            RADIOLOGY & ADDITIONAL TESTS:  Results Reviewed: Y  Imaging Personally Reviewed: N  Electrocardiogram Personally Reviewed: RADHIKA

## 2025-01-20 NOTE — PROGRESS NOTE ADULT - SUBJECTIVE AND OBJECTIVE BOX
Subjective: Patient seen and examined at bedside, no current complaints, no overnight events, denies SOB/CP/N/V.       MEDICATIONS  (STANDING):  influenza   Vaccine 0.5 milliLiter(s) IntraMuscular once  nebivolol 10 milliGRAM(s) Oral daily  ondansetron Injectable 4 milliGRAM(s) IV Push once  pantoprazole  Injectable 40 milliGRAM(s) IV Push every 12 hours    MEDICATIONS  (PRN):      Vital Signs Last 24 Hrs  T(C): 37 (19 Jan 2025 23:33), Max: 38.9 (19 Jan 2025 19:10)  T(F): 98.6 (19 Jan 2025 23:33), Max: 102 (19 Jan 2025 19:10)  HR: 78 (19 Jan 2025 23:33) (75 - 94)  BP: 161/81 (19 Jan 2025 23:33) (134/82 - 161/81)  BP(mean): --  RR: 18 (19 Jan 2025 23:33) (16 - 18)  SpO2: 94% (19 Jan 2025 23:33) (94% - 98%)    Parameters below as of 19 Jan 2025 23:33  Patient On (Oxygen Delivery Method): room air        Physical Exam:    Constitutional: NAD  HEENT: PERRL, EOMI  Respiratory: Respirations non-labored, no accessory muscle use  Gastrointestinal: Soft, non-tender, non-distended  Neurological: A&O x 3      LABS:                        8.5    17.66 )-----------( 132      ( 19 Jan 2025 12:47 )             27.0     01-19    140  |  108  |  25.8[H]  ----------------------------<  138[H]  4.1   |  21.0[L]  |  0.89    Ca    7.9[L]      19 Jan 2025 03:48    TPro  4.9[L]  /  Alb  3.0[L]  /  TBili  1.2  /  DBili  x   /  AST  1282[H]  /  ALT  1209[H]  /  AlkPhos  112  01-19    PT/INR - ( 18 Jan 2025 06:21 )   PT: 15.1 sec;   INR: 1.34 ratio         PTT - ( 18 Jan 2025 06:21 )  PTT:38.6 sec  Urinalysis Basic - ( 19 Jan 2025 03:56 )    Color: Yellow / Appearance: Clear / SG: >1.030 / pH: x  Gluc: x / Ketone: Negative mg/dL  / Bili: Negative / Urobili: 0.2 mg/dL   Blood: x / Protein: 30 mg/dL / Nitrite: Negative   Leuk Esterase: Negative / RBC: 0 /HPF / WBC 2 /HPF   Sq Epi: x / Non Sq Epi: 6 /HPF / Bacteria: Negative /HPF        A: 63 yo M with hx of anticoagulation disorder/PE (on Pradaxa) , pancreatic ca s/p Whipple (2023) who presents with syncopal /weakness likely 2/2 to GIB.      P:  Monitor abd exam, monitor for hematemesis or bloody stool  Pradaxa being held  No surgical intervention at this time  Plan per primary

## 2025-01-21 LAB
ALBUMIN SERPL ELPH-MCNC: 2.9 G/DL — LOW (ref 3.3–5.2)
ALP SERPL-CCNC: 126 U/L — HIGH (ref 40–120)
ALT FLD-CCNC: 1093 U/L — HIGH
ANION GAP SERPL CALC-SCNC: 12 MMOL/L — SIGNIFICANT CHANGE UP (ref 5–17)
AST SERPL-CCNC: 466 U/L — HIGH
BILIRUB SERPL-MCNC: 2 MG/DL — SIGNIFICANT CHANGE UP (ref 0.4–2)
BUN SERPL-MCNC: 11.4 MG/DL — SIGNIFICANT CHANGE UP (ref 8–20)
CALCIUM SERPL-MCNC: 7.9 MG/DL — LOW (ref 8.4–10.5)
CHLORIDE SERPL-SCNC: 102 MMOL/L — SIGNIFICANT CHANGE UP (ref 96–108)
CO2 SERPL-SCNC: 24 MMOL/L — SIGNIFICANT CHANGE UP (ref 22–29)
CREAT SERPL-MCNC: 0.69 MG/DL — SIGNIFICANT CHANGE UP (ref 0.5–1.3)
EGFR: 105 ML/MIN/1.73M2 — SIGNIFICANT CHANGE UP
GLUCOSE SERPL-MCNC: 104 MG/DL — HIGH (ref 70–99)
HCT VFR BLD CALC: 25.2 % — LOW (ref 39–50)
HGB BLD-MCNC: 8.1 G/DL — LOW (ref 13–17)
MCHC RBC-ENTMCNC: 25.5 PG — LOW (ref 27–34)
MCHC RBC-ENTMCNC: 32.1 G/DL — SIGNIFICANT CHANGE UP (ref 32–36)
MCV RBC AUTO: 79.2 FL — LOW (ref 80–100)
PLATELET # BLD AUTO: 95 K/UL — LOW (ref 150–400)
POTASSIUM SERPL-MCNC: 3.7 MMOL/L — SIGNIFICANT CHANGE UP (ref 3.5–5.3)
POTASSIUM SERPL-SCNC: 3.7 MMOL/L — SIGNIFICANT CHANGE UP (ref 3.5–5.3)
PROT SERPL-MCNC: 5.7 G/DL — LOW (ref 6.6–8.7)
RBC # BLD: 3.18 M/UL — LOW (ref 4.2–5.8)
RBC # FLD: 16.2 % — HIGH (ref 10.3–14.5)
SODIUM SERPL-SCNC: 138 MMOL/L — SIGNIFICANT CHANGE UP (ref 135–145)
WBC # BLD: 10.89 K/UL — HIGH (ref 3.8–10.5)
WBC # FLD AUTO: 10.89 K/UL — HIGH (ref 3.8–10.5)

## 2025-01-21 PROCEDURE — 99233 SBSQ HOSP IP/OBS HIGH 50: CPT

## 2025-01-21 RX ORDER — POLYETHYLENE GLYCOL 3350 420 G/4L
2000 POWDER, FOR SOLUTION ORAL ONCE
Refills: 0 | Status: COMPLETED | OUTPATIENT
Start: 2025-01-21 | End: 2025-01-21

## 2025-01-21 RX ADMIN — Medication 400 MILLIGRAM(S): at 07:59

## 2025-01-21 RX ADMIN — SODIUM PHOSPHATE, DIBASIC, ANHYDROUS, POTASSIUM PHOSPHATE, MONOBASIC, AND SODIUM PHOSPHATE, MONOBASIC, MONOHYDRATE 2 PACKET(S): 852; 155; 130 TABLET, COATED ORAL at 17:47

## 2025-01-21 RX ADMIN — Medication 400 MILLIGRAM(S): at 12:03

## 2025-01-21 RX ADMIN — PANTOPRAZOLE 40 MILLIGRAM(S): 20 TABLET, DELAYED RELEASE ORAL at 17:47

## 2025-01-21 RX ADMIN — PANTOPRAZOLE 40 MILLIGRAM(S): 20 TABLET, DELAYED RELEASE ORAL at 06:15

## 2025-01-21 RX ADMIN — POLYETHYLENE GLYCOL 3350 2000 MILLILITER(S): 420 POWDER, FOR SOLUTION ORAL at 17:47

## 2025-01-21 RX ADMIN — NEBIVOLOL 10 MILLIGRAM(S): 10 TABLET ORAL at 06:15

## 2025-01-21 RX ADMIN — Medication 400 MILLIGRAM(S): at 17:48

## 2025-01-21 RX ADMIN — SODIUM PHOSPHATE, DIBASIC, ANHYDROUS, POTASSIUM PHOSPHATE, MONOBASIC, AND SODIUM PHOSPHATE, MONOBASIC, MONOHYDRATE 2 PACKET(S): 852; 155; 130 TABLET, COATED ORAL at 07:59

## 2025-01-21 RX ADMIN — SODIUM PHOSPHATE, DIBASIC, ANHYDROUS, POTASSIUM PHOSPHATE, MONOBASIC, AND SODIUM PHOSPHATE, MONOBASIC, MONOHYDRATE 2 PACKET(S): 852; 155; 130 TABLET, COATED ORAL at 12:04

## 2025-01-21 NOTE — CONSULT NOTE ADULT - SUBJECTIVE AND OBJECTIVE BOX
64 year old male with hypertension, atrial fibrillation, lupus anticoagulant and pancreatic cancer s/p Whipple's, XRT and currently on chemotherapy which was held last month presents with dizziness, fatigue and exertional dyspnea when he awoke this morning associated with bloody bowel movements. Hypotensive in ER responding to hydration; Anemia (Hgb 8.5) and PRBC x 2 in addition to Praxbind given. Also received PipTaz, Acetaminophen and Pantoprazole 80mg IV    PAST MEDICAL & SURGICAL HISTORY:  Hypertension  Paroxysmal atrial fibrillation  Lupus anticoagulant positive  Pancreatic cancer  H/O Whipple procedure  H/O left knee surgery    Allergies  No Known Drug Allergies  latex (Rash)      MEDICATIONS  (STANDING):  influenza   Vaccine 0.5 milliLiter(s) IntraMuscular once  magnesium oxide 400 milliGRAM(s) Oral three times a day with meals  nebivolol 10 milliGRAM(s) Oral daily  ondansetron Injectable 4 milliGRAM(s) IV Push once  pantoprazole  Injectable 40 milliGRAM(s) IV Push every 12 hours  potassium phosphate / sodium phosphate Powder (PHOS-NaK) 2 Packet(s) Oral three times a day with meals    MEDICATIONS  (PRN):    Vital Signs Last 24 Hrs  T(C): 37.1 (21 Jan 2025 11:04), Max: 37.7 (21 Jan 2025 04:32)  T(F): 98.7 (21 Jan 2025 11:04), Max: 99.8 (21 Jan 2025 04:32)  HR: 63 (21 Jan 2025 11:04) (63 - 74)  BP: 116/67 (21 Jan 2025 11:04) (106/63 - 143/76)  BP(mean): --  RR: 18 (21 Jan 2025 11:04) (18 - 19)  SpO2: 98% (21 Jan 2025 11:04) (94% - 98%)    Parameters below as of 21 Jan 2025 11:04  Patient On (Oxygen Delivery Method): room air    CBC                          8.1    10.89 )-----------( 95       ( 21 Jan 2025 03:44 )             25.2     CHEM    01-21    138  |  102  |  11.4  ----------------------------<  104[H]  3.7   |  24.0  |  0.69    Ca    7.9[L]      21 Jan 2025 03:44  Phos  1.8     01-20  Mg     1.6     01-20    TPro  5.7[L]  /  Alb  2.9[L]  /  TBili  2.0  /  DBili  x   /  AST  466[H]  /  ALT  1093[H]  /  AlkPhos  126[H]  01-21           64 year old male who follows Dr Eller at [facility] [place] for pancreatic cancer s/p Whipple's, XRT and currently on chemotherapy which was held last month presents with dizziness, fatigue and exertional dyspnea when he awoke this morning associated with bloody bowel movements. Hypotensive in ER responding to hydration; Anemia (Hgb 8.5) and PRBC x 2 in addition to Praxbind given. Also received PipTaz, Acetaminophen and Pantoprazole 80mg IV    PAST MEDICAL & SURGICAL HISTORY:  Hypertension  Paroxysmal atrial fibrillation  Lupus anticoagulant positive  Pancreatic cancer  H/O Whipple procedure  H/O left knee surgery    Allergies  No Known Drug Allergies  latex (Rash)      MEDICATIONS  (STANDING):  influenza   Vaccine 0.5 milliLiter(s) IntraMuscular once  magnesium oxide 400 milliGRAM(s) Oral three times a day with meals  nebivolol 10 milliGRAM(s) Oral daily  ondansetron Injectable 4 milliGRAM(s) IV Push once  pantoprazole  Injectable 40 milliGRAM(s) IV Push every 12 hours  potassium phosphate / sodium phosphate Powder (PHOS-NaK) 2 Packet(s) Oral three times a day with meals    MEDICATIONS  (PRN):    Vital Signs Last 24 Hrs  T(C): 37.1 (21 Jan 2025 11:04), Max: 37.7 (21 Jan 2025 04:32)  T(F): 98.7 (21 Jan 2025 11:04), Max: 99.8 (21 Jan 2025 04:32)  HR: 63 (21 Jan 2025 11:04) (63 - 74)  BP: 116/67 (21 Jan 2025 11:04) (106/63 - 143/76)  BP(mean): --  RR: 18 (21 Jan 2025 11:04) (18 - 19)  SpO2: 98% (21 Jan 2025 11:04) (94% - 98%)    Parameters below as of 21 Jan 2025 11:04  Patient On (Oxygen Delivery Method): room air    CBC                          8.1    10.89 )-----------( 95       ( 21 Jan 2025 03:44 )             25.2     CHEM    01-21    138  |  102  |  11.4  ----------------------------<  104[H]  3.7   |  24.0  |  0.69    Ca    7.9[L]      21 Jan 2025 03:44  Phos  1.8     01-20  Mg     1.6     01-20    TPro  5.7[L]  /  Alb  2.9[L]  /  TBili  2.0  /  DBili  x   /  AST  466[H]  /  ALT  1093[H]  /  AlkPhos  126[H]  01-21

## 2025-01-21 NOTE — PROGRESS NOTE ADULT - SUBJECTIVE AND OBJECTIVE BOX
Chief Complaint:  Patient is a 64y old  Male who presents with a chief complaint of GI Bleeding  Symptomatic anemia (20 Jan 2025 10:48)      HPI/ 24 hr events: Patient seen and examined at bedside. Patient feeling well this morning. Tolerating clear diet. Reports BM this morning with some dark clots, no BRBPR. Denies nausea, vomiting,, abdominal pain. Vitals are overall stable, Hgb 8.0gm this morning.    REVIEW OF SYSTEMS:   General: Negative  HEENT: Negative  CV: Negative  Respiratory: Negative  GI: See HPI  : Negative  MSK: Negative  Hematologic: Negative  Skin: Negative    MEDICATIONS:   MEDICATIONS  (STANDING):  influenza   Vaccine 0.5 milliLiter(s) IntraMuscular once  magnesium oxide 400 milliGRAM(s) Oral three times a day with meals  nebivolol 10 milliGRAM(s) Oral daily  ondansetron Injectable 4 milliGRAM(s) IV Push once  pantoprazole  Injectable 40 milliGRAM(s) IV Push every 12 hours  potassium phosphate / sodium phosphate Powder (PHOS-NaK) 2 Packet(s) Oral three times a day with meals    MEDICATIONS  (PRN):      Packed Red Cells Order:  1 Unit; Irradiated  Indication: Anemia due to Active Hemorrhage - Medical Conditions e.   Indications for Irradiated: Aggressive (Hematologic) chemotherapy  Infuse Unit : 3 Hours (01-20-25 @ 09:33)  Packed Red Cells Order:  1 Unit  Indication: Anemia due to Active Hemorrhage - Medical Conditions e.  Infuse Unit : 1 Hour (01-18-25 @ 08:52)  Packed Red Cells Order:  1 Unit  Indication: Anemia due to Active Hemorrhage - Medical Conditions e.  Infuse Unit : 1 Hour (01-18-25 @ 08:52)        DIET:  Diet, DASH/TLC:   Sodium & Cholesterol Restricted (01-21-25 @ 09:08) [Active]          ALLERGIES:   Allergies    No Known Drug Allergies  latex (Rash)    Intolerances        VITAL SIGNS:   Vital Signs Last 24 Hrs  T(C): 36.6 (21 Jan 2025 07:59), Max: 37.7 (21 Jan 2025 04:32)  T(F): 97.9 (21 Jan 2025 07:59), Max: 99.8 (21 Jan 2025 04:32)  HR: 64 (21 Jan 2025 07:59) (60 - 74)  BP: 120/72 (21 Jan 2025 07:59) (106/63 - 143/76)  BP(mean): --  RR: 18 (21 Jan 2025 07:59) (18 - 19)  SpO2: 94% (21 Jan 2025 07:59) (94% - 97%)    Parameters below as of 21 Jan 2025 07:59  Patient On (Oxygen Delivery Method): room air      I&O's Summary      PHYSICAL EXAM:   GENERAL:  No acute distress  HEENT:  NC/AT, conjunctiva clear, sclera anicteric  CHEST:  No increased effort  HEART:  Regular rate  ABDOMEN:  Soft, non-tender, non-distended, normoactive bowel sounds, no rebound or guarding  EXTREMITIES: No edema  SKIN:  Warm, dry  NEURO:  Calm, cooperative    LABS:                        8.1    10.89 )-----------( 95       ( 21 Jan 2025 03:44 )             25.2     Hemoglobin: 8.1 g/dL (01-21-25 @ 03:44)  Hemoglobin: 7.5 g/dL (01-20-25 @ 17:37)  Hemoglobin: 7.2 g/dL (01-20-25 @ 08:01)  Hemoglobin: 7.0 g/dL (01-20-25 @ 06:34)  Hemoglobin: 8.5 g/dL (01-19-25 @ 12:47)  Hemoglobin: 8.5 g/dL (01-19-25 @ 03:48)  Hemoglobin: 9.5 g/dL (01-18-25 @ 19:40)  Hemoglobin: 9.6 g/dL (01-18-25 @ 11:30)    01-21    138  |  102  |  11.4  ----------------------------<  104[H]  3.7   |  24.0  |  0.69    Ca    7.9[L]      21 Jan 2025 03:44  Phos  1.8     01-20  Mg     1.6     01-20    TPro  5.7[L]  /  Alb  2.9[L]  /  TBili  2.0  /  DBili  x   /  AST  466[H]  /  ALT  1093[H]  /  AlkPhos  126[H]  01-21    LIVER FUNCTIONS - ( 21 Jan 2025 03:44 )  Alb: 2.9 g/dL / Pro: 5.7 g/dL / ALK PHOS: 126 U/L / ALT: 1093 U/L / AST: 466 U/L / GGT: x                 Culture - Urine (collected 19 Jan 2025 03:56)  Source: Clean Catch Clean Catch (Midstream)  Final Report (20 Jan 2025 10:22):    No growth      RADIOLOGY & ADDITIONAL STUDIES:      ACC: [id number] EXAM:  CT ABDOMEN AND PELVIS WAW IC   ORDERED BY: MARIBETH JAIME     PROCEDURE DATE:  01/18/2025          INTERPRETATION:  CLINICAL INFORMATION: GI bleed, bloody bowel movements    ADDITIONAL CLINICAL INFORMATION: Other, Non-specified    COMPARISON: January 18, 2025    CONTRAST/COMPLICATIONS:  IV Contrast: Omnipaque 350  95 cc administered   5 cc discarded  Oral Contrast: NONE  .    PROCEDURE:  CT of the Abdomen and Pelvis was performed.  Precontrast, Arterial and Delayed phases were performed.  Sagittal and coronal reformats were performed.    FINDINGS:  LOWER CHEST: Within normal limits.    LIVER: Areas of steatosis in the left and right lobes. Scattered small   cysts.  BILE DUCTS: Normal caliber. Pneumobilia compatible with Whipple procedure.  GALLBLADDER: Cholecystectomy.  SPLEEN: Within normal limits.  PANCREAS: Status post Whipple procedure. Unremarkable pancreatic remnant.  ADRENALS: Within normal limits.  KIDNEYS/URETERS: Bilateral renal cysts.    BLADDER: Within normal limits.  REPRODUCTIVE ORGANS: Moderate prostatomegaly.    BOWEL: No bowel obstruction. Appendix not definitively visualized. No   active gastrointestinal bleed. Gastrojejunostomy as per Whipple procedure.  PERITONEUM/RETROPERITONEUM: Within normal limits.  VESSELS: Chronic splenic vein and severe focal stenosis of the   extrahepatic portal vein, with numerous upper abdominal collateral   vessels.  LYMPH NODES: No lymphadenopathy.  ABDOMINAL WALL: Within normal limits.  BONES: No acute findings.    IMPRESSION:  No active gastrointestinal bleed.    Status post Whipple procedure.    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    --- End of Report ---      KEN PRITCHARD MD; Attending Radiologist  This document has been electronically signed. Jan 18 2025 10:56AM  01-18-25 @ 09:46      ACC: [id number] EXAM:  US ABDOMEN COMPLETE   ORDERED BY: TRINH CLANCY     PROCEDURE DATE:  01/19/2025          INTERPRETATION:  CLINICAL INFORMATION: Hepatic disease. GI hemorrhage    COMPARISON: CT 1/18/2025    TECHNIQUE: Sonography of the abdomen.    FINDINGS:  Liver: Heterogeneously increased hepatic echotexture. 2 cm subcapsular   hepatic cyst  Bile ducts: Normal caliber. Common bile duct measures 7.5 cm.  Gallbladder: Cholecystectomy.  Pancreas: Obscured  Spleen: 15 cm, Splenomegaly  Right kidney: 9 cm. No hydronephrosis. 7.5 x 6 cm upper pole cyst  Left kidney: 11.6 cm. No hydronephrosis. 16mm mid renal cyst  Ascites: None.  Aorta and IVC: Visualized portions are within normal limits.    IMPRESSION:  Heterogeneously increased hepatic echotexture, splenomegaly,   corresponding to recent CT        --- End of Report ---      JODI TAYLOR MD; Attending Radiologist  This document has been electronically signed. Jan 19 2025 12:10PM -- -- --       Chief Complaint:  Patient is a 64y old  Male who presents with a chief complaint of GI Bleeding  Symptomatic anemia (20 Jan 2025 10:48)      HPI/ 24 hr events: Patient seen and examined at bedside. Patient feeling well this morning. Tolerating clear diet. Reports BM this morning with some dark blood clots, no BRBPR. Denies nausea, vomiting,, abdominal pain. Vitals are overall stable, Hgb 8.0gm this morning.    REVIEW OF SYSTEMS:   General: Negative  HEENT: Negative  CV: Negative  Respiratory: Negative  GI: See HPI  : Negative  MSK: Negative  Hematologic: Negative  Skin: Negative    MEDICATIONS:   MEDICATIONS  (STANDING):  influenza   Vaccine 0.5 milliLiter(s) IntraMuscular once  magnesium oxide 400 milliGRAM(s) Oral three times a day with meals  nebivolol 10 milliGRAM(s) Oral daily  ondansetron Injectable 4 milliGRAM(s) IV Push once  pantoprazole  Injectable 40 milliGRAM(s) IV Push every 12 hours  potassium phosphate / sodium phosphate Powder (PHOS-NaK) 2 Packet(s) Oral three times a day with meals    MEDICATIONS  (PRN):      Packed Red Cells Order:  1 Unit; Irradiated  Indication: Anemia due to Active Hemorrhage - Medical Conditions e.   Indications for Irradiated: Aggressive (Hematologic) chemotherapy  Infuse Unit : 3 Hours (01-20-25 @ 09:33)  Packed Red Cells Order:  1 Unit  Indication: Anemia due to Active Hemorrhage - Medical Conditions e.  Infuse Unit : 1 Hour (01-18-25 @ 08:52)  Packed Red Cells Order:  1 Unit  Indication: Anemia due to Active Hemorrhage - Medical Conditions e.  Infuse Unit : 1 Hour (01-18-25 @ 08:52)        DIET:  Diet, DASH/TLC:   Sodium & Cholesterol Restricted (01-21-25 @ 09:08) [Active]          ALLERGIES:   Allergies    No Known Drug Allergies  latex (Rash)    Intolerances        VITAL SIGNS:   Vital Signs Last 24 Hrs  T(C): 36.6 (21 Jan 2025 07:59), Max: 37.7 (21 Jan 2025 04:32)  T(F): 97.9 (21 Jan 2025 07:59), Max: 99.8 (21 Jan 2025 04:32)  HR: 64 (21 Jan 2025 07:59) (60 - 74)  BP: 120/72 (21 Jan 2025 07:59) (106/63 - 143/76)  BP(mean): --  RR: 18 (21 Jan 2025 07:59) (18 - 19)  SpO2: 94% (21 Jan 2025 07:59) (94% - 97%)    Parameters below as of 21 Jan 2025 07:59  Patient On (Oxygen Delivery Method): room air      I&O's Summary      PHYSICAL EXAM:   GENERAL:  No acute distress  HEENT:  NC/AT, conjunctiva clear, sclera anicteric  CHEST:  No increased effort  HEART:  Regular rate  ABDOMEN:  Soft, non-tender, non-distended, normoactive bowel sounds, no rebound or guarding  EXTREMITIES: No edema  SKIN:  Warm, dry  NEURO:  Calm, cooperative    LABS:                        8.1    10.89 )-----------( 95       ( 21 Jan 2025 03:44 )             25.2     Hemoglobin: 8.1 g/dL (01-21-25 @ 03:44)  Hemoglobin: 7.5 g/dL (01-20-25 @ 17:37)  Hemoglobin: 7.2 g/dL (01-20-25 @ 08:01)  Hemoglobin: 7.0 g/dL (01-20-25 @ 06:34)  Hemoglobin: 8.5 g/dL (01-19-25 @ 12:47)  Hemoglobin: 8.5 g/dL (01-19-25 @ 03:48)  Hemoglobin: 9.5 g/dL (01-18-25 @ 19:40)  Hemoglobin: 9.6 g/dL (01-18-25 @ 11:30)    01-21    138  |  102  |  11.4  ----------------------------<  104[H]  3.7   |  24.0  |  0.69    Ca    7.9[L]      21 Jan 2025 03:44  Phos  1.8     01-20  Mg     1.6     01-20    TPro  5.7[L]  /  Alb  2.9[L]  /  TBili  2.0  /  DBili  x   /  AST  466[H]  /  ALT  1093[H]  /  AlkPhos  126[H]  01-21    LIVER FUNCTIONS - ( 21 Jan 2025 03:44 )  Alb: 2.9 g/dL / Pro: 5.7 g/dL / ALK PHOS: 126 U/L / ALT: 1093 U/L / AST: 466 U/L / GGT: x                 Culture - Urine (collected 19 Jan 2025 03:56)  Source: Clean Catch Clean Catch (Midstream)  Final Report (20 Jan 2025 10:22):    No growth      RADIOLOGY & ADDITIONAL STUDIES:      ACC: [id number] EXAM:  CT ABDOMEN AND PELVIS WAW IC   ORDERED BY: MARIBETH JAIME     PROCEDURE DATE:  01/18/2025          INTERPRETATION:  CLINICAL INFORMATION: GI bleed, bloody bowel movements    ADDITIONAL CLINICAL INFORMATION: Other, Non-specified    COMPARISON: January 18, 2025    CONTRAST/COMPLICATIONS:  IV Contrast: Omnipaque 350  95 cc administered   5 cc discarded  Oral Contrast: NONE  .    PROCEDURE:  CT of the Abdomen and Pelvis was performed.  Precontrast, Arterial and Delayed phases were performed.  Sagittal and coronal reformats were performed.    FINDINGS:  LOWER CHEST: Within normal limits.    LIVER: Areas of steatosis in the left and right lobes. Scattered small   cysts.  BILE DUCTS: Normal caliber. Pneumobilia compatible with Whipple procedure.  GALLBLADDER: Cholecystectomy.  SPLEEN: Within normal limits.  PANCREAS: Status post Whipple procedure. Unremarkable pancreatic remnant.  ADRENALS: Within normal limits.  KIDNEYS/URETERS: Bilateral renal cysts.    BLADDER: Within normal limits.  REPRODUCTIVE ORGANS: Moderate prostatomegaly.    BOWEL: No bowel obstruction. Appendix not definitively visualized. No   active gastrointestinal bleed. Gastrojejunostomy as per Whipple procedure.  PERITONEUM/RETROPERITONEUM: Within normal limits.  VESSELS: Chronic splenic vein and severe focal stenosis of the   extrahepatic portal vein, with numerous upper abdominal collateral   vessels.  LYMPH NODES: No lymphadenopathy.  ABDOMINAL WALL: Within normal limits.  BONES: No acute findings.    IMPRESSION:  No active gastrointestinal bleed.    Status post Whipple procedure.    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    --- End of Report ---      KEN PRITCHARD MD; Attending Radiologist  This document has been electronically signed. Jan 18 2025 10:56AM  01-18-25 @ 09:46      ACC: [id number] EXAM:  US ABDOMEN COMPLETE   ORDERED BY: TRINH CLANCY     PROCEDURE DATE:  01/19/2025          INTERPRETATION:  CLINICAL INFORMATION: Hepatic disease. GI hemorrhage    COMPARISON: CT 1/18/2025    TECHNIQUE: Sonography of the abdomen.    FINDINGS:  Liver: Heterogeneously increased hepatic echotexture. 2 cm subcapsular   hepatic cyst  Bile ducts: Normal caliber. Common bile duct measures 7.5 cm.  Gallbladder: Cholecystectomy.  Pancreas: Obscured  Spleen: 15 cm, Splenomegaly  Right kidney: 9 cm. No hydronephrosis. 7.5 x 6 cm upper pole cyst  Left kidney: 11.6 cm. No hydronephrosis. 16mm mid renal cyst  Ascites: None.  Aorta and IVC: Visualized portions are within normal limits.    IMPRESSION:  Heterogeneously increased hepatic echotexture, splenomegaly,   corresponding to recent CT        --- End of Report ---      JODI TAYLOR MD; Attending Radiologist  This document has been electronically signed. Jan 19 2025 12:10PM -- -- --

## 2025-01-21 NOTE — PROGRESS NOTE ADULT - SUBJECTIVE AND OBJECTIVE BOX
Hospitalist Progress Note    Chief Complaint:  GI Bleed    SUBJECTIVE / OVERNIGHT EVENTS:  No events overnight, patient seen at bedside, in NAD, no new complaints today. Patient denies chest pain, SOB, abd pain, N/V, fever, chills, dysuria or any other complaints. All remainder ROS negative.     MEDICATIONS  (STANDING):  influenza   Vaccine 0.5 milliLiter(s) IntraMuscular once  magnesium oxide 400 milliGRAM(s) Oral three times a day with meals  nebivolol 10 milliGRAM(s) Oral daily  ondansetron Injectable 4 milliGRAM(s) IV Push once  pantoprazole  Injectable 40 milliGRAM(s) IV Push every 12 hours  potassium phosphate / sodium phosphate Powder (PHOS-NaK) 2 Packet(s) Oral three times a day with meals    MEDICATIONS  (PRN):        I&O's Summary      PHYSICAL EXAM:  Vital Signs Last 24 Hrs  T(C): 36.6 (21 Jan 2025 07:59), Max: 37.7 (21 Jan 2025 04:32)  T(F): 97.9 (21 Jan 2025 07:59), Max: 99.8 (21 Jan 2025 04:32)  HR: 64 (21 Jan 2025 07:59) (60 - 74)  BP: 120/72 (21 Jan 2025 07:59) (106/63 - 143/76)  BP(mean): --  RR: 18 (21 Jan 2025 07:59) (18 - 19)  SpO2: 94% (21 Jan 2025 07:59) (94% - 97%)    Parameters below as of 21 Jan 2025 07:59  Patient On (Oxygen Delivery Method): room air        General: Middle aged male sitting up on stretcher, fatigued  HEENT:  Pale conjunctiva, anicteric sclera  NECK:  Supple  CVS: Irregular S1 S2  RESP:  Clear to auscultation bilaterally  GI:  Soft with healed scar. No tenderness  : No suprapubic tenderness  MSK:  Moves all extremities  CNS:  Awake, sleepy, oriented x 3. Fluent speech, no gross focal or global deficit appreciated  INTEG:  Warm skin  PSYCH:  Fair mood, fatigued and tired      LABS:                        8.1    10.89 )-----------( 95       ( 21 Jan 2025 03:44 )             25.2     01-21    138  |  102  |  11.4  ----------------------------<  104[H]  3.7   |  24.0  |  0.69    Ca    7.9[L]      21 Jan 2025 03:44  Phos  1.8     01-20  Mg     1.6     01-20    TPro  5.7[L]  /  Alb  2.9[L]  /  TBili  2.0  /  DBili  x   /  AST  466[H]  /  ALT  1093[H]  /  AlkPhos  126[H]  01-21          Urinalysis Basic - ( 21 Jan 2025 03:44 )    Color: x / Appearance: x / SG: x / pH: x  Gluc: 104 mg/dL / Ketone: x  / Bili: x / Urobili: x   Blood: x / Protein: x / Nitrite: x   Leuk Esterase: x / RBC: x / WBC x   Sq Epi: x / Non Sq Epi: x / Bacteria: x        Culture - Urine (collected 19 Jan 2025 03:56)  Source: Clean Catch Clean Catch (Midstream)  Final Report (20 Jan 2025 10:22):    No growth      CAPILLARY BLOOD GLUCOSE            RADIOLOGY & ADDITIONAL TESTS:  Results Reviewed: Y  Imaging Personally Reviewed: N  Electrocardiogram Personally Reviewed: RADHIKA

## 2025-01-21 NOTE — CONSULT NOTE ADULT - ASSESSMENT
64 year old male with hypertension, atrial fibrillation, lupus anticoagulant and pancreatic cancer s/p Whipple's, XRT and currently on chemotherapy which was held last month presents with dizziness, fatigue and exertional dyspnea when he awoke this morning associated with bloody bowel movements.  Hypotensive in ER responding to hydration; Anemia (Hgb 8.5) and PRBC x 2 in addition to Praxbind given.   Also received PipTaz, Acetaminophen and Pantoprazole 80mg IV    # Acute Blood Loss Anemia, Symptomatic  # GI Bleed, lower  - Pantoprazole 40mg IV q12  - AC on hold   - GI following -- Will plan for colonoscopy tomorrow 1/22    # History of pancreatic cancer  - Follow up with [facility] post discharge    Intermittent Pneumatic Compression for VTE prophylaxis  Ambulate ad lb                       64 year old male with hypertension, atrial fibrillation, lupus anticoagulant and pancreatic cancer s/p Whipple's, XRT and currently on chemotherapy which was held last month presents with dizziness, fatigue and exertional dyspnea when he awoke this morning associated with bloody bowel movements.  Hypotensive in ER responding to hydration; Anemia (Hgb 8.5) and PRBC x 2 in addition to Praxbind given.   Also received PipTaz, Acetaminophen and Pantoprazole 80mg IV    # Acute Blood Loss Anemia, Symptomatic  # GI Bleed, lower  - Pantoprazole 40mg IV q12  - AC on hold   - GI following -- Will plan for colonoscopy tomorrow 1/22    # History of pancreatic cancer  [place] [facility] [place]  - s/p Fatou at [place] with Dr Nevarez   - S/P XRT  - S/P Folfirinox then FOLFIRI Last dose December 2024  - WIll get records [place] [facility] [place]     Will follow and update [facility]

## 2025-01-21 NOTE — PROGRESS NOTE ADULT - ASSESSMENT
Mr. Dawn is a 64 year old gentleman with significant past medical history of pancreatic cancer following with [facility] s/p Whipple/chemo/RT currently on chemotherapy which has been held in the setting of dizziness/fatigue/exertional dyspnea/ anemia for which he was being followed closely, history of DVT in the setting of lupus anti-coagulant, paroxsymal atrial fibrillation and chronic Pradaxa for at least 20 years, who presents from home after being found down on the floor of his room, patient reported standing up and feeling light headed, did not lose consciousness although admits to dizziness, decided to sit down on the ground (slowly slid down to the floor) and then describes severe weakness for which he was unable to rise or to call for help, he was down for anywhere between 20-45 minutes per recall. He was found by his wife who phoned EMS to bring him to hospital. Previous to this event patient endorses feeling at his baseline level of health. He denies recent illness. Symptoms came on suddenly without warning. He then developed perfuse bloody bowel movements for which he presents to the ED. GI asked to consult for rectal bleeding.    #rectal bleed  CT Abdomen and Pelvis w/wo IV Cont (01.18.25) No active gastrointestinal bleed. Status post Whipple procedure. Severe short segment stenosis of the portal vein and chronically occluded splenic vein with numerous upper abdominal collateral vessels.    - Hgb 8.0gm, reports dark clots, no BRBPR  - Trend CBC, transfuse as needed. Monitor for signs of bleeding.   - Avoid NSAIDs    - IV PPI BID for GI mucosal cytoprotection  - Further anemia workup per primary team  Mr. Dawn is a 64 year old gentleman with significant past medical history of pancreatic cancer following with [facility] s/p Whipple/chemo/RT currently on chemotherapy which has been held in the setting of dizziness/fatigue/exertional dyspnea/ anemia for which he was being followed closely, history of DVT in the setting of lupus anti-coagulant, paroxsymal atrial fibrillation and chronic Pradaxa for at least 20 years, who presents from home after being found down on the floor of his room, patient reported standing up and feeling light headed, did not lose consciousness although admits to dizziness, decided to sit down on the ground (slowly slid down to the floor) and then describes severe weakness for which he was unable to rise or to call for help, he was down for anywhere between 20-45 minutes per recall. He was found by his wife who phoned EMS to bring him to hospital. Previous to this event patient endorses feeling at his baseline level of health. He denies recent illness. Symptoms came on suddenly without warning. He then developed perfuse bloody bowel movements for which he presents to the ED. GI asked to consult for rectal bleeding.    #rectal bleed  CT Abdomen and Pelvis w/wo IV Cont (01.18.25) No active gastrointestinal bleed. Status post Whipple procedure. Severe short segment stenosis of the portal vein and chronically occluded splenic vein with numerous upper abdominal collateral vessels.    - Hgb 8.0gm, reports dark blood clots, no BRBPR  - Trend CBC, transfuse as needed. Monitor for signs of bleeding.   - Avoid NSAIDs  - PPI BID for GI mucosal cytoprotection  - Will plan for colonoscopy tomorrow 1/22  - Prep to start this evening   - NPO after midnight   - Keep Pradaxa on hold for procedure   - Active T&S, INR < 1.5, platelet> 50  _________________________________________________________________  Assessment and recommendations are final when note is signed by the attending physician.

## 2025-01-21 NOTE — PROGRESS NOTE ADULT - ASSESSMENT
64 year old male with hypertension, atrial fibrillation, lupus anticoagulant and pancreatic cancer s/p Whipple's, XRT and currently on chemotherapy which was held last month presents with dizziness, fatigue and exertional dyspnea when he awoke this morning associated with bloody bowel movements.  Hypotensive in ER responding to hydration; Anemia (Hgb 8.5) and PRBC x 2 in addition to Praxbind given.   Also received PipTaz, Acetaminophen and Pantoprazole 80mg IV    # Acute Blood Loss Anemia, Symptomatic  # GI Bleed, likely ischemic colitis  - clinically improving, no further bleeding  - Maintain 2 large bore IV  - advance diet  - Pantoprazole 40mg IV q12  - Monitor Hgb, transfuse to keep>7  - restart pradaxa    #Shock Liver  - trend CMP  - improving slowly    # Atrial Fibrillation  # Hypertension  - Bystolic with parameters for rate control; Hold home Amlodipine, HCTZ  - restart pradaxa    # History of pancreatic cancer  - Follow up with [facility] post discharge    restart pradaxa for DVT PPx  Ambulate ad lb    Disposition - likely home in 24 hours pending cultures and no further bleeding while on pradaxa    Discussed with patient at bedside   64 year old male with hypertension, atrial fibrillation, lupus anticoagulant and pancreatic cancer s/p Whipple's, XRT and currently on chemotherapy which was held last month presents with dizziness, fatigue and exertional dyspnea when he awoke this morning associated with bloody bowel movements.  Hypotensive in ER responding to hydration; Anemia (Hgb 8.5) and PRBC x 2 in addition to Praxbind given.   Also received PipTaz, Acetaminophen and Pantoprazole 80mg IV    # Acute Blood Loss Anemia, Symptomatic  # GI Bleed, likely ischemic colitis  - clinically improving, no further bleeding  - Maintain 2 large bore IV  - advance diet  - Pantoprazole 40mg IV q12  - Monitor Hgb, transfuse to keep>7  - restart pradaxa tomorrow after colonoscopy  - colo tomorrow    #Shock Liver  - trend CMP  - improving slowly    # Atrial Fibrillation  # Hypertension  - Bystolic with parameters for rate control; Hold home Amlodipine, HCTZ  - restart pradaxa    # History of pancreatic cancer  - Follow up with [facility] post discharge    VTE for now  Ambulate ad lb    Disposition - likely home in 24 hours pending cultures and colonoscopy    Discussed with patient at bedside

## 2025-01-22 ENCOUNTER — TRANSCRIPTION ENCOUNTER (OUTPATIENT)
Age: 65
End: 2025-01-22

## 2025-01-22 LAB
ANION GAP SERPL CALC-SCNC: 10 MMOL/L — SIGNIFICANT CHANGE UP (ref 5–17)
BASOPHILS # BLD AUTO: 0.02 K/UL — SIGNIFICANT CHANGE UP (ref 0–0.2)
BASOPHILS NFR BLD AUTO: 0.4 % — SIGNIFICANT CHANGE UP (ref 0–2)
BUN SERPL-MCNC: 6.9 MG/DL — LOW (ref 8–20)
CALCIUM SERPL-MCNC: 7.5 MG/DL — LOW (ref 8.4–10.5)
CHLORIDE SERPL-SCNC: 105 MMOL/L — SIGNIFICANT CHANGE UP (ref 96–108)
CO2 SERPL-SCNC: 25 MMOL/L — SIGNIFICANT CHANGE UP (ref 22–29)
CREAT SERPL-MCNC: 0.64 MG/DL — SIGNIFICANT CHANGE UP (ref 0.5–1.3)
EGFR: 107 ML/MIN/1.73M2 — SIGNIFICANT CHANGE UP
EOSINOPHIL # BLD AUTO: 0.08 K/UL — SIGNIFICANT CHANGE UP (ref 0–0.5)
EOSINOPHIL NFR BLD AUTO: 1.5 % — SIGNIFICANT CHANGE UP (ref 0–6)
GLUCOSE SERPL-MCNC: 96 MG/DL — SIGNIFICANT CHANGE UP (ref 70–99)
HCT VFR BLD CALC: 24.6 % — LOW (ref 39–50)
HGB BLD-MCNC: 7.7 G/DL — LOW (ref 13–17)
IMM GRANULOCYTES NFR BLD AUTO: 0.6 % — SIGNIFICANT CHANGE UP (ref 0–0.9)
LYMPHOCYTES # BLD AUTO: 0.89 K/UL — LOW (ref 1–3.3)
LYMPHOCYTES # BLD AUTO: 16.6 % — SIGNIFICANT CHANGE UP (ref 13–44)
MAGNESIUM SERPL-MCNC: 1.9 MG/DL — SIGNIFICANT CHANGE UP (ref 1.6–2.6)
MCHC RBC-ENTMCNC: 25.3 PG — LOW (ref 27–34)
MCHC RBC-ENTMCNC: 31.3 G/DL — LOW (ref 32–36)
MCV RBC AUTO: 80.9 FL — SIGNIFICANT CHANGE UP (ref 80–100)
MONOCYTES # BLD AUTO: 0.6 K/UL — SIGNIFICANT CHANGE UP (ref 0–0.9)
MONOCYTES NFR BLD AUTO: 11.2 % — SIGNIFICANT CHANGE UP (ref 2–14)
MRSA PCR RESULT.: SIGNIFICANT CHANGE UP
NEUTROPHILS # BLD AUTO: 3.74 K/UL — SIGNIFICANT CHANGE UP (ref 1.8–7.4)
NEUTROPHILS NFR BLD AUTO: 69.7 % — SIGNIFICANT CHANGE UP (ref 43–77)
PLATELET # BLD AUTO: 74 K/UL — LOW (ref 150–400)
POTASSIUM SERPL-MCNC: 3.4 MMOL/L — LOW (ref 3.5–5.3)
POTASSIUM SERPL-SCNC: 3.4 MMOL/L — LOW (ref 3.5–5.3)
RBC # BLD: 3.04 M/UL — LOW (ref 4.2–5.8)
RBC # FLD: 16.5 % — HIGH (ref 10.3–14.5)
S AUREUS DNA NOSE QL NAA+PROBE: SIGNIFICANT CHANGE UP
SODIUM SERPL-SCNC: 140 MMOL/L — SIGNIFICANT CHANGE UP (ref 135–145)
WBC # BLD: 5.36 K/UL — SIGNIFICANT CHANGE UP (ref 3.8–10.5)
WBC # FLD AUTO: 5.36 K/UL — SIGNIFICANT CHANGE UP (ref 3.8–10.5)

## 2025-01-22 PROCEDURE — 45378 DIAGNOSTIC COLONOSCOPY: CPT

## 2025-01-22 PROCEDURE — 99232 SBSQ HOSP IP/OBS MODERATE 35: CPT

## 2025-01-22 DEVICE — NAIL OSTEO 1.5X16MM STRL: Type: IMPLANTABLE DEVICE | Status: FUNCTIONAL

## 2025-01-22 RX ORDER — DABIGATRAN ETEXILATE MESYLATE 150 MG/1
150 CAPSULE ORAL ONCE
Refills: 0 | Status: COMPLETED | OUTPATIENT
Start: 2025-01-22 | End: 2025-01-23

## 2025-01-22 RX ORDER — ANTISEPTIC SURGICAL SCRUB 0.04 MG/ML
1 SOLUTION TOPICAL
Refills: 0 | Status: DISCONTINUED | OUTPATIENT
Start: 2025-01-22 | End: 2025-01-24

## 2025-01-22 RX ADMIN — Medication 400 MILLIGRAM(S): at 11:03

## 2025-01-22 RX ADMIN — Medication 400 MILLIGRAM(S): at 09:23

## 2025-01-22 RX ADMIN — ANTISEPTIC SURGICAL SCRUB 1 APPLICATION(S): 0.04 SOLUTION TOPICAL at 11:04

## 2025-01-22 RX ADMIN — NEBIVOLOL 10 MILLIGRAM(S): 10 TABLET ORAL at 06:21

## 2025-01-22 RX ADMIN — SODIUM PHOSPHATE, DIBASIC, ANHYDROUS, POTASSIUM PHOSPHATE, MONOBASIC, AND SODIUM PHOSPHATE, MONOBASIC, MONOHYDRATE 2 PACKET(S): 852; 155; 130 TABLET, COATED ORAL at 17:41

## 2025-01-22 RX ADMIN — PANTOPRAZOLE 40 MILLIGRAM(S): 20 TABLET, DELAYED RELEASE ORAL at 17:41

## 2025-01-22 RX ADMIN — Medication 400 MILLIGRAM(S): at 17:41

## 2025-01-22 RX ADMIN — PANTOPRAZOLE 40 MILLIGRAM(S): 20 TABLET, DELAYED RELEASE ORAL at 06:21

## 2025-01-22 RX ADMIN — SODIUM PHOSPHATE, DIBASIC, ANHYDROUS, POTASSIUM PHOSPHATE, MONOBASIC, AND SODIUM PHOSPHATE, MONOBASIC, MONOHYDRATE 2 PACKET(S): 852; 155; 130 TABLET, COATED ORAL at 09:23

## 2025-01-22 NOTE — PROGRESS NOTE ADULT - ASSESSMENT
64 year old male with hypertension, atrial fibrillation, lupus anticoagulant and pancreatic cancer s/p Whipple's, XRT and currently on chemotherapy which was held last month presents with dizziness, fatigue and exertional dyspnea when he awoke this morning associated with bloody bowel movements.  Hypotensive in ER responding to hydration; Anemia (Hgb 8.5) and PRBC x 2 in addition to Praxbind given.   Also received PipTaz, Acetaminophen and Pantoprazole 80mg IV    # Acute Blood Loss Anemia, Symptomatic but no is better and is no longer symptomatic.  # GI Bleed, lower  - Pantoprazole 40mg IV q12  - AC on hold for now. May restart depending on colonoscopy result.  - GI following -- colonoscopy planned for today.  - Hgb 7.7, plts 74k. No overt bleeding today.    # History of pancreatic cancer  -follows Dr Eller at [facility] [place]  - Diagnosed in March 2023  - s/p Fatou at [place] with Dr Nevarez   - S/P XRT  - S/P Folfirinox then FOLFIRI Last dose December 2024    Will follow and update [facility]    Thank you

## 2025-01-22 NOTE — PROGRESS NOTE ADULT - SUBJECTIVE AND OBJECTIVE BOX
64 year old male who follows Dr Eller at [facility] for pancreatic cancer s/p Whipple's, XRT and currently on chemotherapy which was held last month presents with dizziness, fatigue and exertional dyspnea when he awoke this morning associated with bloody bowel movements. Hypotensive in ER responding to hydration; Anemia (Hgb 8.5) and PRBC x 2 in addition to Praxbind given. Also received PipTaz, Acetaminophen and Pantoprazole 80mg IV    01/22/2025: clinically, he says that he feels fine. No n/v/d. No pain. No dizziness. Going to for colonoscopy today. Eager to go home.    PAST MEDICAL & SURGICAL HISTORY:  Hypertension  Paroxysmal atrial fibrillation  Lupus anticoagulant positive  Pancreatic cancer  H/O Whipple procedure  H/O left knee surgery    Allergies  No Known Drug Allergies  latex (Rash)    MEDICATIONS  (STANDING):  chlorhexidine 2% Cloths 1 Application(s) Topical <User Schedule>  influenza   Vaccine 0.5 milliLiter(s) IntraMuscular once  magnesium oxide 400 milliGRAM(s) Oral three times a day with meals  nebivolol 10 milliGRAM(s) Oral daily  ondansetron Injectable 4 milliGRAM(s) IV Push once  pantoprazole  Injectable 40 milliGRAM(s) IV Push every 12 hours  potassium phosphate / sodium phosphate Powder (PHOS-NaK) 2 Packet(s) Oral three times a day with meals      Vital Signs Last 24 Hrs  T(C): 36.9 (22 Jan 2025 13:36), Max: 37.6 (21 Jan 2025 16:13)  T(F): 98.5 (22 Jan 2025 13:36), Max: 99.6 (21 Jan 2025 16:13)  HR: 64 (22 Jan 2025 13:36) (61 - 71)  BP: 122/69 (22 Jan 2025 13:36) (111/51 - 150/74)  BP(mean): --  RR: 18 (22 Jan 2025 13:36) (18 - 18)  SpO2: 97% (22 Jan 2025 13:36) (95% - 98%)    Parameters below as of 22 Jan 2025 13:36  Patient On (Oxygen Delivery Method): room air    PE:   NAD  CTAB  RRR  ABD soft, nd, nt  A/O x 3    Labs:    CBC                          7.7    5.36  )-----------( 74       ( 22 Jan 2025 05:30 )             24.6                           8.1    10.89 )-----------( 95       ( 21 Jan 2025 03:44 )             25.2     CHEM    01-22    140  |  105  |  6.9[L]  ----------------------------<  96  3.4[L]   |  25.0  |  0.64    Ca    7.5[L]      22 Jan 2025 05:30  Mg     1.9     01-22    TPro  5.7[L]  /  Alb  2.9[L]  /  TBili  2.0  /  DBili  x   /  AST  466[H]  /  ALT  1093[H]  /  AlkPhos  126[H]  01-21 01-21    138  |  102  |  11.4  ----------------------------<  104[H]  3.7   |  24.0  |  0.69    Ca    7.9[L]      21 Jan 2025 03:44  Phos  1.8     01-20  Mg     1.6     01-20    TPro  5.7[L]  /  Alb  2.9[L]  /  TBili  2.0  /  DBili  x   /  AST  466[H]  /  ALT  1093[H]  /  AlkPhos  126[H]  01-21

## 2025-01-22 NOTE — PROGRESS NOTE ADULT - ASSESSMENT
64 year old male with hypertension, atrial fibrillation, lupus anticoagulant and pancreatic cancer s/p Whipple's, XRT and currently on chemotherapy which was held last month presents with dizziness, fatigue and exertional dyspnea when he awoke this morning associated with bloody bowel movements.  Hypotensive in ER responding to hydration; Anemia (Hgb 8.5) and PRBC x 2 in addition to Praxbind given.   Also received PipTaz, Acetaminophen and Pantoprazole 80mg IV    Acute Blood Loss Anemia, Symptomatic  GI Bleed, likely ischemic colitis  - for colonoscopy today for evaluation  - cbc stable x 3 days  - continue protonix  - Monitor Hgb, transfuse to keep>7  - restart pradaxa today after colonoscopy if no contraindication    Shock Liver  - trend CMP  - improving slowly    Atrial Fibrillation  Hypertension  - Bystolic with parameters for rate control; Hold home Amlodipine, HCTZ  - restart pradaxa after colonoscopy    History of pancreatic cancer  - Follow up with [facility] post discharge    VTE for now  Ambulate ad lb    Disposition - d/c tomorrow if hemoglobin stable on pradaxa    Discussed with patient at bedside

## 2025-01-22 NOTE — PROGRESS NOTE ADULT - SUBJECTIVE AND OBJECTIVE BOX
Dar Swan MD  [facility]  Contact via Teams or text/call at [phone]    Patient is a 64y old  Male who presents with a chief complaint of GI Bleeding  Symptomatic anemia (21 Jan 2025 12:42)    Feels okay.  No abdominal pain.  Denies diarrhea, fever, chills.     Patient seen and examined at bedside. No overnight events reported.     ALLERGIES:  No Known Drug Allergies  latex (Rash)    MEDICATIONS  (STANDING):  chlorhexidine 2% Cloths 1 Application(s) Topical <User Schedule>  influenza   Vaccine 0.5 milliLiter(s) IntraMuscular once  magnesium oxide 400 milliGRAM(s) Oral three times a day with meals  nebivolol 10 milliGRAM(s) Oral daily  ondansetron Injectable 4 milliGRAM(s) IV Push once  pantoprazole  Injectable 40 milliGRAM(s) IV Push every 12 hours  potassium phosphate / sodium phosphate Powder (PHOS-NaK) 2 Packet(s) Oral three times a day with meals    MEDICATIONS  (PRN):    Vital Signs Last 24 Hrs  T(F): 98 (22 Jan 2025 10:41), Max: 99.6 (21 Jan 2025 16:13)  HR: 61 (22 Jan 2025 10:41) (61 - 71)  BP: 123/66 (22 Jan 2025 10:41) (111/51 - 150/74)  RR: 18 (22 Jan 2025 10:41) (18 - 18)  SpO2: 97% (22 Jan 2025 10:41) (95% - 98%)  I&O's Summary    PHYSICAL EXAM:  General: NAD, A/O x 3, appears chronically ill appearing  ENT: No gross hearing impairment, Moist mucous membranes, no thrush  Neck: Supple, No JVD  Lungs: Clear to auscultation bilaterally, good air entry, non-labored breathing  Cardio: RRR, S1/S2, No murmur  Abdomen: Soft, Nontender, Nondistended; Bowel sounds present  Extremities: No calf tenderness, No cyanosis, No pitting edema  Psych: Appropriate mood and affect    LABS:                        7.7    5.36  )-----------( 74       ( 22 Jan 2025 05:30 )             24.6     01-22    140  |  105  |  6.9  ----------------------------<  96  3.4   |  25.0  |  0.64    Ca    7.5      22 Jan 2025 05:30  Phos  1.8     01-20  Mg     1.9     01-22    TPro  5.7  /  Alb  2.9  /  TBili  2.0  /  DBili  x   /  AST  466  /  ALT  1093  /  AlkPhos  126  01-21    Urinalysis Basic - ( 22 Jan 2025 05:30 )    Color: x / Appearance: x / SG: x / pH: x  Gluc: 96 mg/dL / Ketone: x  / Bili: x / Urobili: x   Blood: x / Protein: x / Nitrite: x   Leuk Esterase: x / RBC: x / WBC x   Sq Epi: x / Non Sq Epi: x / Bacteria: x    Culture - Blood (collected 20 Jan 2025 06:34)  Source: .Blood BLOOD  Preliminary Report (22 Jan 2025 13:01):    No growth at 48 Hours    Culture - Urine (collected 19 Jan 2025 03:56)  Source: Clean Catch Clean Catch (Midstream)  Final Report (20 Jan 2025 10:22):    No growth    Culture - Blood (collected 18 Jan 2025 06:21)  Source: .Blood BLOOD  Preliminary Report (22 Jan 2025 13:01):    No growth at 4 days        RADIOLOGY & ADDITIONAL TESTS:    Care Discussed with Consultants/Other Providers:

## 2025-01-23 DIAGNOSIS — C25.9 MALIGNANT NEOPLASM OF PANCREAS, UNSPECIFIED: ICD-10-CM

## 2025-01-23 LAB
BLD GP AB SCN SERPL QL: SIGNIFICANT CHANGE UP
CULTURE RESULTS: SIGNIFICANT CHANGE UP
HCT VFR BLD CALC: 21 % — CRITICAL LOW (ref 39–50)
HCT VFR BLD CALC: 24.5 % — LOW (ref 39–50)
HGB BLD-MCNC: 6.6 G/DL — CRITICAL LOW (ref 13–17)
HGB BLD-MCNC: 7.8 G/DL — LOW (ref 13–17)
MCHC RBC-ENTMCNC: 24.8 PG — LOW (ref 27–34)
MCHC RBC-ENTMCNC: 25.2 PG — LOW (ref 27–34)
MCHC RBC-ENTMCNC: 31.4 G/DL — LOW (ref 32–36)
MCHC RBC-ENTMCNC: 31.8 G/DL — LOW (ref 32–36)
MCV RBC AUTO: 77.8 FL — LOW (ref 80–100)
MCV RBC AUTO: 80.2 FL — SIGNIFICANT CHANGE UP (ref 80–100)
PLATELET # BLD AUTO: 60 K/UL — LOW (ref 150–400)
PLATELET # BLD AUTO: 70 K/UL — LOW (ref 150–400)
RBC # BLD: 2.62 M/UL — LOW (ref 4.2–5.8)
RBC # BLD: 3.15 M/UL — LOW (ref 4.2–5.8)
RBC # FLD: 16.4 % — HIGH (ref 10.3–14.5)
RBC # FLD: 18.7 % — HIGH (ref 10.3–14.5)
SPECIMEN SOURCE: SIGNIFICANT CHANGE UP
WBC # BLD: 2.96 K/UL — LOW (ref 3.8–10.5)
WBC # BLD: 4.02 K/UL — SIGNIFICANT CHANGE UP (ref 3.8–10.5)
WBC # FLD AUTO: 2.96 K/UL — LOW (ref 3.8–10.5)
WBC # FLD AUTO: 4.02 K/UL — SIGNIFICANT CHANGE UP (ref 3.8–10.5)

## 2025-01-23 PROCEDURE — 99232 SBSQ HOSP IP/OBS MODERATE 35: CPT

## 2025-01-23 RX ORDER — METOCLOPRAMIDE 10 MG/1
10 TABLET ORAL ONCE
Refills: 0 | Status: DISCONTINUED | OUTPATIENT
Start: 2025-01-23 | End: 2025-01-23

## 2025-01-23 RX ADMIN — PANTOPRAZOLE 40 MILLIGRAM(S): 20 TABLET, DELAYED RELEASE ORAL at 05:32

## 2025-01-23 RX ADMIN — NEBIVOLOL 10 MILLIGRAM(S): 10 TABLET ORAL at 05:32

## 2025-01-23 RX ADMIN — Medication 400 MILLIGRAM(S): at 07:46

## 2025-01-23 RX ADMIN — SODIUM PHOSPHATE, DIBASIC, ANHYDROUS, POTASSIUM PHOSPHATE, MONOBASIC, AND SODIUM PHOSPHATE, MONOBASIC, MONOHYDRATE 2 PACKET(S): 852; 155; 130 TABLET, COATED ORAL at 11:53

## 2025-01-23 RX ADMIN — Medication 400 MILLIGRAM(S): at 11:53

## 2025-01-23 RX ADMIN — DABIGATRAN ETEXILATE MESYLATE 150 MILLIGRAM(S): 150 CAPSULE ORAL at 00:13

## 2025-01-23 RX ADMIN — SODIUM PHOSPHATE, DIBASIC, ANHYDROUS, POTASSIUM PHOSPHATE, MONOBASIC, AND SODIUM PHOSPHATE, MONOBASIC, MONOHYDRATE 2 PACKET(S): 852; 155; 130 TABLET, COATED ORAL at 07:46

## 2025-01-23 RX ADMIN — ANTISEPTIC SURGICAL SCRUB 1 APPLICATION(S): 0.04 SOLUTION TOPICAL at 05:33

## 2025-01-23 RX ADMIN — PANTOPRAZOLE 40 MILLIGRAM(S): 20 TABLET, DELAYED RELEASE ORAL at 21:29

## 2025-01-23 RX ADMIN — SODIUM PHOSPHATE, DIBASIC, ANHYDROUS, POTASSIUM PHOSPHATE, MONOBASIC, AND SODIUM PHOSPHATE, MONOBASIC, MONOHYDRATE 2 PACKET(S): 852; 155; 130 TABLET, COATED ORAL at 17:02

## 2025-01-23 RX ADMIN — Medication 400 MILLIGRAM(S): at 17:02

## 2025-01-23 NOTE — PROGRESS NOTE ADULT - NS ATTEST RISK PROBLEM GEN_ALL_CORE FT
Patient hemoglobin stable now at 8   Had BM with clots  cbc  ordered in the am    I spoke to Dr Guo- inquiring regarding pt anticoagulation. Will plan for colonoscopy tomorrow. Prep ordered  I discussed the risks and benefits of colonoscopy including, but not limited to bleeding infection perforation
hemoglobin dropped to 6.6. However, pt has not had a BM since before the colonoscopy yesterday  give 1 unit PRBC and check post transfusion hemoglobin   shock liver- CMP ordered
Worsening anemia. Shock liver , ischemic colitis . Now with fever  spoke to attending hospitalist  cbc reviewed. CBC ordered  Ct reviewed and interpreted

## 2025-01-23 NOTE — PROGRESS NOTE ADULT - ASSESSMENT
64 year old male with hypertension, atrial fibrillation, lupus anticoagulant and pancreatic cancer s/p Whipple's, XRT and currently on chemotherapy which was held last month presents with dizziness, fatigue and exertional dyspnea when he awoke this morning associated with bloody bowel movements.  Hypotensive in ER responding to hydration; Anemia (Hgb 8.5) and PRBC x 2 in addition to Praxbind given.   Also received PipTaz, Acetaminophen and Pantoprazole 80mg IV    Acute Blood Loss Anemia, Symptomatic  GI Bleed, likely ischemic colitis  - negative for overt bleeding yesterady  - hgb low today 6.6  - 1 unit PRBC  - recheck at 5PM  - possible post colonoscopy drop?  - monitor stool  - if stable can d/c tomorrow 1/24/25  - continue protonix  - restart pradaxa 1/23/25    Shock Liver  - trend CMP  - improving slowly    Atrial Fibrillation  Hypertension  - Bystolic with parameters for rate control; Hold home Amlodipine, HCTZ  - restart pradaxa after colonoscopy    History of pancreatic cancer  - Follow up with [facility] post discharge    VTE for now  Ambulate ad lb    Disposition - hemoglobin low, monitor on pradaxa, if remains low may need additional testing    Discussed with patient at bedside and wife Laurie over the phone

## 2025-01-23 NOTE — PROGRESS NOTE ADULT - PROBLEM SELECTOR PLAN 2
Patient with fever- needs repeat blood cultures
shock liver .LFT improving  CMP ordered for tomorrow
shock liver resolving  check cmp tomorrow

## 2025-01-23 NOTE — PROGRESS NOTE ADULT - ASSESSMENT
Mr. Dawn is a 64 year old gentleman with significant past medical history of pancreatic cancer following with [facility] s/p Whipple/chemo/RT currently on chemotherapy which has been held in the setting of dizziness/fatigue/exertional dyspnea/ anemia for which he was being followed closely, history of DVT in the setting of lupus anti-coagulant, paroxsymal atrial fibrillation and chronic Pradaxa for at least 20 years, who presents from home after being found down on the floor of his room, patient reported standing up and feeling light headed, did not lose consciousness although admits to dizziness, decided to sit down on the ground (slowly slid down to the floor) and then describes severe weakness for which he was unable to rise or to call for help, he was down for anywhere between 20-45 minutes per recall. He was found by his wife who phoned EMS to bring him to hospital. Previous to this event patient endorses feeling at his baseline level of health. He denies recent illness. Symptoms came on suddenly without warning. He then developed perfuse bloody bowel movements for which he presents to the ED. GI asked to consult for rectal bleeding.    #rectal bleed  CT Abdomen and Pelvis w/wo IV Cont (01.18.25) No active gastrointestinal bleed. Status post Whipple procedure. Severe short segment stenosis of the portal vein and chronically occluded splenic vein with numerous upper abdominal collateral vessels.  Colonoscopy (1/24/25) showing diverticulosis, internal hemorrhoids and resolving ischemic colitis  drop in hgb today to 6.6, pending PRBC  - Trend CBC, transfuse as needed. Monitor for signs of bleeding.   - Avoid NSAIDs  - PPI BID for GI mucosal cytoprotection  - OK to resume Pradaxa   - High fiber diet, pt take creon with meals at home   _________________________________________________________________  Assessment and recommendations are final when note is signed by the attending physician.

## 2025-01-23 NOTE — PROGRESS NOTE ADULT - SUBJECTIVE AND OBJECTIVE BOX
Chief Complaint:  Patient is a 64y old  Male who presents with a chief complaint of GI Bleeding  Symptomatic anemia (2025 12:50)      HPI/ 24 hr events: Patient seen and examined at bedside. S/p Colonoscopy (25) showing diverticulosis, internal hemorrhoids and resolving ischemic colitis. Pt feeling well this morning. Tolerating diet, but hasn't been taking his Creon with meals. No BMs since bowel prep. Denies nausea, vomiting, diarrhea, abdominal pain, hematemesis, hematochezia, melena. Vitals are overall stable, drop in hgb noted to 6.6.      REVIEW OF SYSTEMS:   General: Negative  HEENT: Negative  CV: Negative  Respiratory: Negative  GI: See HPI  : Negative  MSK: Negative  Hematologic: Negative  Skin: Negative    MEDICATIONS:   MEDICATIONS  (STANDING):  chlorhexidine 2% Cloths 1 Application(s) Topical <User Schedule>  influenza   Vaccine 0.5 milliLiter(s) IntraMuscular once  magnesium oxide 400 milliGRAM(s) Oral three times a day with meals  nebivolol 10 milliGRAM(s) Oral daily  ondansetron Injectable 4 milliGRAM(s) IV Push once  pantoprazole  Injectable 40 milliGRAM(s) IV Push every 12 hours  potassium phosphate / sodium phosphate Powder (PHOS-NaK) 2 Packet(s) Oral three times a day with meals    MEDICATIONS  (PRN):      Packed Red Cells Order:  1 Unit  Indication: Hgb <7 gm/dL  Infuse Unit : 2 Hours (25 @ 11:21)  Packed Red Cells Order:  1 Unit; Irradiated  Indication: Anemia due to Active Hemorrhage - Medical Conditions e.   Indications for Irradiated: Aggressive (Hematologic) chemotherapy  Infuse Unit : 3 Hours (25 @ 09:33)  Packed Red Cells Order:  1 Unit  Indication: Anemia due to Active Hemorrhage - Medical Conditions e.  Infuse Unit : 1 Hour (25 @ 08:52)  Packed Red Cells Order:  1 Unit  Indication: Anemia due to Active Hemorrhage - Medical Conditions e.  Infuse Unit : 1 Hour (25 @ 08:52)        DIET:  Diet, Regular (25 @ 17:10) [Active]  Diet, NPO after Midnight:      NPO Start Date: 2025,   NPO Start Time: 23:59  Except Medications (25 @ 09:23) [Active]          ALLERGIES:   Allergies    No Known Drug Allergies  latex (Rash)    Intolerances        VITAL SIGNS:   Vital Signs Last 24 Hrs  T(C): 36.8 (2025 15:25), Max: 37.4 (2025 00:40)  T(F): 98.3 (2025 15:25), Max: 99.3 (2025 00:40)  HR: 66 (2025 15:25) (60 - 69)  BP: 137/74 (2025 15:25) (112/62 - 145/86)  BP(mean): 95 (2025 15:25) (94 - 95)  RR: 19 (2025 15:25) (14 - 23)  SpO2: 96% (2025 15:25) (96% - 100%)    Parameters below as of 2025 15:25  Patient On (Oxygen Delivery Method): room air      I&O's Summary      PHYSICAL EXAM:   GENERAL:  No acute distress  HEENT:  NC/AT, conjunctiva clear, sclera anicteric  CHEST:  No increased effort  HEART:  Regular rate  ABDOMEN:  Soft, non-tender, non-distended, normoactive bowel sounds, no rebound or guarding  EXTREMITIES: No edema  SKIN:  Warm, dry  NEURO:  Calm, cooperative    LABS:                        6.6    2.96  )-----------( 60       ( 2025 09:54 )             21.0     Hemoglobin: 6.6 g/dL (25 @ 09:54)  Hemoglobin: 7.7 g/dL (25 @ 05:30)  Hemoglobin: 8.1 g/dL (25 @ 03:44)  Hemoglobin: 7.5 g/dL (25 @ 17:37)        140  |  105  |  6.9[L]  ----------------------------<  96  3.4[L]   |  25.0  |  0.64    Ca    7.5[L]      2025 05:30  Mg     1.9                                                       RADIOLOGY & ADDITIONAL STUDIES:          Colonoscopy Report        Date: 2025        Patient Name: CARLOS APONTE        MRN: [id number]        Account Number:        [id number]        Gender: Male         (age): 1960 (64)        Instrument(s):        [facility] [id number]        Attending/Fellow:        Anaya Anthony MD, DO                Procedure Room #:        PROCEDURE ROOM 1                        ASA Class:        P3 - 2025 5:00 PM Anaya Anthony MD, DO        Administered Medications:        As per Anesthesiology Record        Indications:        Rectal bleedin.3 - K62.5        Procedure:        This is a average risk patient  undergoing a diagnostic colonoscopy.        The procedure, indications, preparation and potential complications were    explained to the patient, who indicated understanding and signed the    corresponding consent forms. MAC was administered by the anesthesiologist.    Continuous pulse oximetry and blood pressure monitoring were used throughout the    procedure. Supplemental oxygenwas used. The quality of preparation was 6-9 on    the BBP scale/adequate. Patient was placed in left lateral decubitus position.    Digital exam was normal. The colonoscope was introduced through the rectum and    advanced under direct visualizationuntil cecum was reached. The appendiceal    orifice and the ileocecal valve were identified. The terminal ileum was    identified. Careful visualization was performed as the instrument was withdrawn.    Retroflexion in the rectum was performed successfully and there were no grade    III hemorrhoids. Patient tolerance to procedure was excellent. The procedure was    somewhat difficult. Blood loss was none..        [place] Bowel Preparation Score:        Using the [place] Bowel Preparation Score, each segment of the colon was graded    as follows:        Left Colon: Good, 2 and Good, 2 | Transverse Colon: Good, 2  | Right Colon:    Good, 2        Limitations/Complications:        There were no apparent limitations or complications        Findings:        Excavated lesions Several non-bleeding diverticula were seen in the sigmoid    colon. Diverticulosis appeared to be of moderate severity.        Protruding lesions Medium grade/stage III internal hemorrhoids were noted.        Additional findings -Mild erythema of left colon . Maybe resolving ischemic    colitis.        Impressions:        Grade/Stage III internal hemorrhoids.        Moderate diverticulosis of the sigmoid colon.        -Mild erythema of left colon. Maybe resolving ischemic colitis.        Plan:        Discharge to home        High Fiber Diet        Additional Notes:        May restart plavix        regular diet        Anaya Anthony MD,         Version 1, Electronically signed on 2025 5:04:35 PM by Anaya Anthony MD, DO

## 2025-01-23 NOTE — PROGRESS NOTE ADULT - PROBLEM SELECTOR PLAN 3
Patient's elevated LFT improving. Likely due to shock liver  CT-  my interpretation - whipple. Liver  normal.
F/u with oncology

## 2025-01-23 NOTE — PROGRESS NOTE ADULT - SUBJECTIVE AND OBJECTIVE BOX
Dar Swan MD  [facility]  Contact via Teams or text/call at [phone]    Patient is a 64y old  Male who presents with a chief complaint of GI Bleeding  Symptomatic anemia (23 Jan 2025 09:17)    Feels fine.  no bowel movements overnight.  Denies dizziness.      Patient seen and examined at bedside. No overnight events reported.     ALLERGIES:  No Known Drug Allergies  latex (Rash)    MEDICATIONS  (STANDING):  chlorhexidine 2% Cloths 1 Application(s) Topical <User Schedule>  influenza   Vaccine 0.5 milliLiter(s) IntraMuscular once  magnesium oxide 400 milliGRAM(s) Oral three times a day with meals  nebivolol 10 milliGRAM(s) Oral daily  ondansetron Injectable 4 milliGRAM(s) IV Push once  pantoprazole  Injectable 40 milliGRAM(s) IV Push every 12 hours  potassium phosphate / sodium phosphate Powder (PHOS-NaK) 2 Packet(s) Oral three times a day with meals    MEDICATIONS  (PRN):    Vital Signs Last 24 Hrs  T(F): 97.9 (23 Jan 2025 10:25), Max: 99.3 (23 Jan 2025 00:40)  HR: 69 (23 Jan 2025 10:25) (58 - 69)  BP: 128/65 (23 Jan 2025 10:25) (112/62 - 145/86)  RR: 18 (23 Jan 2025 04:41) (14 - 23)  SpO2: 96% (23 Jan 2025 10:25) (96% - 100%)  I&O's Summary    PHYSICAL EXAM:  General: NAD, A/O x 3, appears chronically ill   ENT: No gross hearing impairment, Moist mucous membranes, no thrush  Neck: Supple, No JVD  Lungs: Clear to auscultation bilaterally, good air entry, non-labored breathing  Cardio: RRR, S1/S2, No murmur  Abdomen: Soft, Nontender, Nondistended; Bowel sounds present  Extremities: No calf tenderness, No cyanosis, No pitting edema  Psych: Appropriate mood and affect    LABS:                        6.6    2.96  )-----------( 60       ( 23 Jan 2025 09:54 )             21.0     01-22    140  |  105  |  6.9  ----------------------------<  96  3.4   |  25.0  |  0.64    Ca    7.5      22 Jan 2025 05:30  Mg     1.9     01-22    TPro  5.7  /  Alb  2.9  /  TBili  2.0  /  DBili  x   /  AST  466  /  ALT  1093  /  AlkPhos  126  01-21    Urinalysis Basic - ( 22 Jan 2025 05:30 )    Color: x / Appearance: x / SG: x / pH: x  Gluc: 96 mg/dL / Ketone: x  / Bili: x / Urobili: x   Blood: x / Protein: x / Nitrite: x   Leuk Esterase: x / RBC: x / WBC x   Sq Epi: x / Non Sq Epi: x / Bacteria: x    Culture - Blood (collected 20 Jan 2025 06:34)  Source: .Blood BLOOD  Preliminary Report (22 Jan 2025 13:01):    No growth at 48 Hours    Culture - Urine (collected 19 Jan 2025 03:56)  Source: Clean Catch Clean Catch (Midstream)  Final Report (20 Jan 2025 10:22):    No growth    Culture - Blood (collected 18 Jan 2025 06:21)  Source: .Blood BLOOD  Preliminary Report (22 Jan 2025 13:01):    No growth at 4 days        RADIOLOGY & ADDITIONAL TESTS:    Care Discussed with Consultants/Other Providers:

## 2025-01-23 NOTE — PROGRESS NOTE ADULT - ASSESSMENT
64 year old male with hypertension, atrial fibrillation, lupus anticoagulant and pancreatic cancer s/p Whipple's, XRT and currently on chemotherapy which was held last month presents with dizziness, fatigue and exertional dyspnea when he awoke this morning associated with bloody bowel movements.  Hypotensive in ER responding to hydration; Anemia (Hgb 8.5) and PRBC x 2 in addition to Praxbind given.   Also received PipTaz, Acetaminophen and Pantoprazole 80mg IV    # Acute Blood Loss Anemia, Symptomatic but no is better and is no longer symptomatic.  # GI Bleed, lower  - Pantoprazole 40mg IV q12  - AC on hold for now. May restart depending on colonoscopy result.  - GI following -- colonoscopy planned for today.  - Hgb 7.7, plts 74k. No overt bleeding today.    # History of pancreatic cancer  -follows Dr Eller at [facility] [place]  - Diagnosed in March 2023  - s/p Fatou at [place] with Dr Nevarez   - S/P XRT  - S/P Folfirinox then FOLFIRI Last dose December 2024    Will follow and update [facility]    Thank you       64 year old male with hypertension, atrial fibrillation, lupus anticoagulant and pancreatic cancer s/p Whipple's, XRT and currently on chemotherapy which was held last month presents with dizziness, fatigue and exertional dyspnea when he awoke this morning associated with bloody bowel movements.  Hypotensive in ER responding to hydration; Anemia (Hgb 8.5) and PRBC x 2 in addition to Praxbind given.   Also received PipTaz, Acetaminophen and Pantoprazole 80mg IV    # Acute Blood Loss Anemia, Symptomatic but no is better and is no longer symptomatic.  # GI Bleed, lower  - Pantoprazole 40mg IV q12  - AC on hold for now. May restart depending on colonoscopy result.  - Hgb 7.7, plts 74k. No overt bleeding today.  s/p colonoscopy 1/22- Grade/Stage III internal hemorrhoids.  Moderate diverticulosis of the sigmoid colon. Mild erythema of left colon. Maybe resolving ischemic colitis.  cleared to restart AC and d/c home       # History of pancreatic cancer  -[place] [facility] [place]  - Diagnosed in March 2023  - s/p Fatou [facility] with Dr Nevarez   - S/P XRT  - S/P Folfirinox then FOLFIRI Last dose December 2024    Will follow and update [facility]    Thank you       64 year old male with hypertension, atrial fibrillation, lupus anticoagulant and pancreatic cancer s/p Whipple's, XRT and currently on chemotherapy which was held last month presents with dizziness, fatigue and exertional dyspnea when he awoke this morning associated with bloody bowel movements.  Hypotensive in ER responding to hydration; Anemia (Hgb 8.5) and PRBC x 2 in addition to Praxbind given.   Also received PipTaz, Acetaminophen and Pantoprazole 80mg IV    # Acute Blood Loss Anemia, Symptomatic but no is better and is no longer symptomatic.  # GI Bleed, lower  - Pantoprazole 40mg IV q12  - AC on hold for now. May restart depending on colonoscopy result.  - Hgb 7.7, plts 74k. No overt bleeding today.  s/p colonoscopy 1/22- Grade/Stage III internal hemorrhoids.  Moderate diverticulosis of the sigmoid colon. Mild erythema of left colon. Maybe resolving ischemic colitis.  cleared to restart AC and d/c home       # History of pancreatic cancer  [place] [facility] [place] in [place] 2023  - s/p Whipple at [place] with Dr Nevarez   - S/P XRT  - S/P Folfirinox then FOLFIRI Last dose December 2024    Will follow and update [facility]    Addendum: pt Hb today 6.6.  pt had one dose of Pradaxa last night. no BM this am yet.  pt to receive blood tx today.  discharge cancelled.  cont to monitor hb.  GI follow up and recommendations for continued GI bleeding.  need for EGD?   hold AC for now.  consider SCDs   spoke to wife over phone     Thank you

## 2025-01-23 NOTE — PROVIDER CONTACT NOTE (CRITICAL VALUE NOTIFICATION) - ACTION/TREATMENT ORDERED:
MD made aware, coming to bedside to assess, awaiting further orders. MD made aware, coming to bedside to assess, awaiting further orders. Pt asymptomatic at this time. MD made aware, coming to bedside to assess, 1 unit PRBCs to be ordered and administered and labs to be repeated. Pt asymptomatic at this time.

## 2025-01-23 NOTE — PROGRESS NOTE ADULT - SUBJECTIVE AND OBJECTIVE BOX
64 year old male who follows Dr Eller at [facility] for pancreatic cancer s/p Whipple's, XRT and currently on chemotherapy which was held last month presents with dizziness, fatigue and exertional dyspnea when he awoke this morning associated with bloody bowel movements. Hypotensive in ER responding to hydration; Anemia (Hgb 8.5) and PRBC x 2 in addition to Praxbind given. Also received PipTaz, Acetaminophen and Pantoprazole 80mg IV    01/22/2025: clinically, he says that he feels fine. No n/v/d. No pain. No dizziness. Going to for colonoscopy today. Eager to go home.    PAST MEDICAL & SURGICAL HISTORY:  Hypertension  Paroxysmal atrial fibrillation  Lupus anticoagulant positive  Pancreatic cancer  H/O Whipple procedure  H/O left knee surgery    Allergies  No Known Drug Allergies  latex (Rash)    MEDICATIONS  (STANDING):  chlorhexidine 2% Cloths 1 Application(s) Topical <User Schedule>  influenza   Vaccine 0.5 milliLiter(s) IntraMuscular once  magnesium oxide 400 milliGRAM(s) Oral three times a day with meals  nebivolol 10 milliGRAM(s) Oral daily  ondansetron Injectable 4 milliGRAM(s) IV Push once  pantoprazole  Injectable 40 milliGRAM(s) IV Push every 12 hours  potassium phosphate / sodium phosphate Powder (PHOS-NaK) 2 Packet(s) Oral three times a day with meals    MEDICATIONS  (PRN):    Vital Signs Last 24 Hrs  T(C): 36.7 (23 Jan 2025 04:41), Max: 37.4 (23 Jan 2025 00:40)  T(F): 98 (23 Jan 2025 04:41), Max: 99.3 (23 Jan 2025 00:40)  HR: 68 (23 Jan 2025 04:41) (58 - 69)  BP: 128/67 (23 Jan 2025 04:41) (112/62 - 145/86)  BP(mean): --  RR: 18 (23 Jan 2025 04:41) (14 - 23)  SpO2: 96% (23 Jan 2025 04:41) (96% - 100%)    Parameters below as of 23 Jan 2025 04:41  Patient On (Oxygen Delivery Method): room air      PE:   NAD  CTAB  RRR  ABD soft, nd, nt  A/O x 3    Labs:    CBC                          7.7    5.36  )-----------( 74       ( 22 Jan 2025 05:30 )             24.6                           7.7    5.36  )-----------( 74       ( 22 Jan 2025 05:30 )             24.6                           8.1    10.89 )-----------( 95       ( 21 Jan 2025 03:44 )             25.2     CHEM    01-22    140  |  105  |  6.9[L]  ----------------------------<  96  3.4[L]   |  25.0  |  0.64    Ca    7.5[L]      22 Jan 2025 05:30  Mg     1.9     01-22    TPro  5.7[L]  /  Alb  2.9[L]  /  TBili  2.0  /  DBili  x   /  AST  466[H]  /  ALT  1093[H]  /  AlkPhos  126[H]  01-21 01-21    138  |  102  |  11.4  ----------------------------<  104[H]  3.7   |  24.0  |  0.69    Ca    7.9[L]      21 Jan 2025 03:44  Phos  1.8     01-20  Mg     1.6     01-20    TPro  5.7[L]  /  Alb  2.9[L]  /  TBili  2.0  /  DBili  x   /  AST  466[H]  /  ALT  1093[H]  /  AlkPhos  126[H]  01-21           64 year old male who follows Dr Eller at [facility] for pancreatic cancer s/p Whipple's, XRT and currently on chemotherapy which was held last month presents with dizziness, fatigue and exertional dyspnea when he awoke this morning associated with bloody bowel movements. Hypotensive in ER responding to hydration; Anemia (Hgb 8.5) and PRBC x 2 in addition to Praxbind given. Also received PipTaz, Acetaminophen and Pantoprazole 80mg IV    01/22/2025: clinically, he says that he feels fine. No n/v/d. No pain. No dizziness. Going to for colonoscopy today. Eager to go home.    1/23:  s/p colonoscopy yesterday.  no bleeding source identified .  d/c home tody     PAST MEDICAL & SURGICAL HISTORY:  Hypertension  Paroxysmal atrial fibrillation  Lupus anticoagulant positive  Pancreatic cancer  H/O Whipple procedure  H/O left knee surgery    Allergies  No Known Drug Allergies  latex (Rash)    MEDICATIONS  (STANDING):  chlorhexidine 2% Cloths 1 Application(s) Topical <User Schedule>  influenza   Vaccine 0.5 milliLiter(s) IntraMuscular once  magnesium oxide 400 milliGRAM(s) Oral three times a day with meals  nebivolol 10 milliGRAM(s) Oral daily  ondansetron Injectable 4 milliGRAM(s) IV Push once  pantoprazole  Injectable 40 milliGRAM(s) IV Push every 12 hours  potassium phosphate / sodium phosphate Powder (PHOS-NaK) 2 Packet(s) Oral three times a day with meals    MEDICATIONS  (PRN):    Vital Signs Last 24 Hrs  T(C): 36.7 (23 Jan 2025 04:41), Max: 37.4 (23 Jan 2025 00:40)  T(F): 98 (23 Jan 2025 04:41), Max: 99.3 (23 Jan 2025 00:40)  HR: 68 (23 Jan 2025 04:41) (58 - 69)  BP: 128/67 (23 Jan 2025 04:41) (112/62 - 145/86)  BP(mean): --  RR: 18 (23 Jan 2025 04:41) (14 - 23)  SpO2: 96% (23 Jan 2025 04:41) (96% - 100%)    Parameters below as of 23 Jan 2025 04:41  Patient On (Oxygen Delivery Method): room air      PE:   NAD  CTAB  RRR  ABD soft, nd, nt  A/O x 3    Labs:    CBC                          7.7    5.36  )-----------( 74       ( 22 Jan 2025 05:30 )             24.6                           7.7    5.36  )-----------( 74       ( 22 Jan 2025 05:30 )             24.6                           8.1    10.89 )-----------( 95       ( 21 Jan 2025 03:44 )             25.2     CHEM    01-22    140  |  105  |  6.9[L]  ----------------------------<  96  3.4[L]   |  25.0  |  0.64    Ca    7.5[L]      22 Jan 2025 05:30  Mg     1.9     01-22    TPro  5.7[L]  /  Alb  2.9[L]  /  TBili  2.0  /  DBili  x   /  AST  466[H]  /  ALT  1093[H]  /  AlkPhos  126[H]  01-21 01-21    138  |  102  |  11.4  ----------------------------<  104[H]  3.7   |  24.0  |  0.69    Ca    7.9[L]      21 Jan 2025 03:44  Phos  1.8     01-20  Mg     1.6     01-20    TPro  5.7[L]  /  Alb  2.9[L]  /  TBili  2.0  /  DBili  x   /  AST  466[H]  /  ALT  1093[H]  /  AlkPhos  126[H]  01-21

## 2025-01-23 NOTE — PROGRESS NOTE ADULT - PROBLEM SELECTOR PLAN 1
hemoglobin dropped to 6.6. However, pt has not had a BM since before the colonoscopy yesterday  tolerating solid diet  give 1 unit PRBC and check post transfusion hemoglobin   colon showed left side erythema , diverticulosis, hemorrhoids
Patient hemoglobin stable now at 8   Had BM with clots  cbc  ordered in the am    I spoke to Dr Guo- inquiring regarding pt anticoagulation. Will plan for colonoscopy tomorrow. Prep ordered  I discussed the risks and benefits of colonoscopy including, but not limited to bleeding infection perforation
NO further rectal bleeding- likely ischemic colitis   hemoglobin dropped to 7.2  would give one unit PRBC, post transfusion CBC to be ordered   plan discussed with  Dr Guo  CT

## 2025-01-24 ENCOUNTER — TRANSCRIPTION ENCOUNTER (OUTPATIENT)
Age: 65
End: 2025-01-24

## 2025-01-24 VITALS
DIASTOLIC BLOOD PRESSURE: 75 MMHG | HEART RATE: 64 BPM | TEMPERATURE: 98 F | OXYGEN SATURATION: 98 % | SYSTOLIC BLOOD PRESSURE: 123 MMHG | RESPIRATION RATE: 18 BRPM

## 2025-01-24 LAB
HCT VFR BLD CALC: 22.8 % — LOW (ref 39–50)
HGB BLD-MCNC: 7.3 G/DL — LOW (ref 13–17)
MCHC RBC-ENTMCNC: 24.7 PG — LOW (ref 27–34)
MCHC RBC-ENTMCNC: 32 G/DL — SIGNIFICANT CHANGE UP (ref 32–36)
MCV RBC AUTO: 77 FL — LOW (ref 80–100)
PLATELET # BLD AUTO: 64 K/UL — LOW (ref 150–400)
RBC # BLD: 2.96 M/UL — LOW (ref 4.2–5.8)
RBC # FLD: 18.7 % — HIGH (ref 10.3–14.5)
WBC # BLD: 3.05 K/UL — LOW (ref 3.8–10.5)
WBC # FLD AUTO: 3.05 K/UL — LOW (ref 3.8–10.5)

## 2025-01-24 PROCEDURE — 96374 THER/PROPH/DIAG INJ IV PUSH: CPT

## 2025-01-24 PROCEDURE — 36430 TRANSFUSION BLD/BLD COMPNT: CPT

## 2025-01-24 PROCEDURE — 87640 STAPH A DNA AMP PROBE: CPT

## 2025-01-24 PROCEDURE — 72125 CT NECK SPINE W/O DYE: CPT | Mod: MC

## 2025-01-24 PROCEDURE — P9016: CPT

## 2025-01-24 PROCEDURE — 76700 US EXAM ABDOM COMPLETE: CPT

## 2025-01-24 PROCEDURE — 86923 COMPATIBILITY TEST ELECTRIC: CPT

## 2025-01-24 PROCEDURE — 0225U NFCT DS DNA&RNA 21 SARSCOV2: CPT

## 2025-01-24 PROCEDURE — 36415 COLL VENOUS BLD VENIPUNCTURE: CPT

## 2025-01-24 PROCEDURE — 99291 CRITICAL CARE FIRST HOUR: CPT | Mod: 25

## 2025-01-24 PROCEDURE — 99239 HOSP IP/OBS DSCHRG MGMT >30: CPT

## 2025-01-24 PROCEDURE — P9040: CPT

## 2025-01-24 PROCEDURE — 87040 BLOOD CULTURE FOR BACTERIA: CPT

## 2025-01-24 PROCEDURE — 85730 THROMBOPLASTIN TIME PARTIAL: CPT

## 2025-01-24 PROCEDURE — 96376 TX/PRO/DX INJ SAME DRUG ADON: CPT

## 2025-01-24 PROCEDURE — 84100 ASSAY OF PHOSPHORUS: CPT

## 2025-01-24 PROCEDURE — 81001 URINALYSIS AUTO W/SCOPE: CPT

## 2025-01-24 PROCEDURE — 74178 CT ABD&PLV WO CNTR FLWD CNTR: CPT | Mod: MC

## 2025-01-24 PROCEDURE — 74176 CT ABD & PELVIS W/O CONTRAST: CPT | Mod: MC

## 2025-01-24 PROCEDURE — 80048 BASIC METABOLIC PNL TOTAL CA: CPT

## 2025-01-24 PROCEDURE — 87086 URINE CULTURE/COLONY COUNT: CPT

## 2025-01-24 PROCEDURE — 96375 TX/PRO/DX INJ NEW DRUG ADDON: CPT

## 2025-01-24 PROCEDURE — 85610 PROTHROMBIN TIME: CPT

## 2025-01-24 PROCEDURE — 71275 CT ANGIOGRAPHY CHEST: CPT | Mod: MC

## 2025-01-24 PROCEDURE — 99231 SBSQ HOSP IP/OBS SF/LOW 25: CPT

## 2025-01-24 PROCEDURE — C9399: CPT

## 2025-01-24 PROCEDURE — 85027 COMPLETE CBC AUTOMATED: CPT

## 2025-01-24 PROCEDURE — 84484 ASSAY OF TROPONIN QUANT: CPT

## 2025-01-24 PROCEDURE — 86901 BLOOD TYPING SEROLOGIC RH(D): CPT

## 2025-01-24 PROCEDURE — 86900 BLOOD TYPING SEROLOGIC ABO: CPT

## 2025-01-24 PROCEDURE — 83735 ASSAY OF MAGNESIUM: CPT

## 2025-01-24 PROCEDURE — 83605 ASSAY OF LACTIC ACID: CPT

## 2025-01-24 PROCEDURE — 93005 ELECTROCARDIOGRAM TRACING: CPT

## 2025-01-24 PROCEDURE — 86850 RBC ANTIBODY SCREEN: CPT

## 2025-01-24 PROCEDURE — 70450 CT HEAD/BRAIN W/O DYE: CPT | Mod: MC

## 2025-01-24 PROCEDURE — 80053 COMPREHEN METABOLIC PANEL: CPT

## 2025-01-24 PROCEDURE — 71045 X-RAY EXAM CHEST 1 VIEW: CPT

## 2025-01-24 PROCEDURE — 85025 COMPLETE CBC W/AUTO DIFF WBC: CPT

## 2025-01-24 PROCEDURE — 87641 MR-STAPH DNA AMP PROBE: CPT

## 2025-01-24 RX ADMIN — NEBIVOLOL 10 MILLIGRAM(S): 10 TABLET ORAL at 06:01

## 2025-01-24 RX ADMIN — Medication 400 MILLIGRAM(S): at 07:53

## 2025-01-24 RX ADMIN — PANTOPRAZOLE 40 MILLIGRAM(S): 20 TABLET, DELAYED RELEASE ORAL at 06:00

## 2025-01-24 RX ADMIN — SODIUM PHOSPHATE, DIBASIC, ANHYDROUS, POTASSIUM PHOSPHATE, MONOBASIC, AND SODIUM PHOSPHATE, MONOBASIC, MONOHYDRATE 2 PACKET(S): 852; 155; 130 TABLET, COATED ORAL at 07:53

## 2025-01-24 RX ADMIN — ANTISEPTIC SURGICAL SCRUB 1 APPLICATION(S): 0.04 SOLUTION TOPICAL at 06:02

## 2025-01-24 NOTE — DISCHARGE NOTE NURSING/CASE MANAGEMENT/SOCIAL WORK - PATIENT PORTAL LINK FT
You can access the FollowMyHealth Patient Portal offered by [facility] by registering at the following website: [email or web address]. By joining [employer]’s FollowMyHealth portal, you will also be able to view your health information using other applications (apps) compatible with our system.

## 2025-01-24 NOTE — DISCHARGE NOTE NURSING/CASE MANAGEMENT/SOCIAL WORK - FINANCIAL ASSISTANCE
[facility] provides services at a reduced cost to those who are determined to be eligible through [facility]’s financial assistance program. Information regarding [facility]’s financial assistance program can be found by going to [email or web address] or by calling [phone].

## 2025-01-24 NOTE — PROGRESS NOTE ADULT - ASSESSMENT
64 year old male with hypertension, atrial fibrillation, lupus anticoagulant and pancreatic cancer s/p Whipple's, XRT and currently on chemotherapy which was held last month presents with dizziness, fatigue and exertional dyspnea when he awoke this morning associated with bloody bowel movements.  Hypotensive in ER responding to hydration; Anemia (Hgb 8.5) and PRBC x 2 in addition to Praxbind given.   Also received PipTaz, Acetaminophen and Pantoprazole 80mg IV    # Acute Blood Loss Anemia, Symptomatic but no is better and is no longer symptomatic.  # GI Bleed, lower  - Pantoprazole 40mg IV q12  - AC on hold for now.     s/p colonoscopy 1/22- Grade/Stage III internal hemorrhoids.  Moderate diverticulosis of the sigmoid colon. Mild erythema of left colon. Maybe resolving ischemic colitis.    drop in hgb , s/p 1u PRBC 1/24  Hgb 7.3 this am, plt count 64,000  A/C on hold  GI f/u, ? need for EGD    # History of pancreatic cancer  -follows Dr Eller at [facility] [place]  - Diagnosed in March 2023  - s/p Fatou at [place] with Dr Nevarez   - S/P XRT  - S/P Folfirinox then FOLFIRI Last dose December 2024    Will follow and update [facility]

## 2025-01-24 NOTE — DISCHARGE NOTE NURSING/CASE MANAGEMENT/SOCIAL WORK - NSDPLANG ASIS_GEN_ALL_CORE
[facility] INPATIENT ENCOUNTER  PEDIATRICS DAILY PROGRESS NOTE    ADMISSION DATE:  2/23/2021  DATE:  2/24/2021  CURRENT HOSPITAL DAY:  Hospital Day: 2   ATTENDING PHYSICIAN:  Dayna Mccullough DO  CODE STATUS:  Full Resuscitation    CHIEF COMPLAINT: S/p resection of suspected epidermoid cyst    ACTIVE PROBLEMS:    Active Hospital Problems    Diagnosis   • Neck mass       INTERVAL HISTORY:    Junior Bocanegra is a 15 year old male patient admitted after intraoral resection of suspected epidermoid cyst on the floor of mouth. Patient is POD #1 from surgery. The surgery went well without complications. Patient attempted Hycet twice last night for pain, but was unable to tolerate the doses due to nausea/vomiting. This morning, patient complained of 10/10 pain which required Morphine. Patient has been drinking sips of water, but has not had any significant PO intake. Patient complains that he cannot push liquids to the back of his throat due to pain and swelling of the oropharynx.    REVIEW OF SYSTEMS:  Review of Systems   Constitutional: Negative.    HENT: Positive for facial swelling and trouble swallowing.    Eyes: Negative.    Respiratory: Negative.    Cardiovascular: Negative.    Gastrointestinal: Negative.    Endocrine: Negative.    Genitourinary: Negative.    Musculoskeletal: Negative.    Skin: Negative.    Allergic/Immunologic: Negative.    Neurological: Negative.    Hematological: Negative.    Psychiatric/Behavioral: Negative.        MEDICATIONS:    Current Facility-Administered Medications   Medication Dose Route Frequency Provider Last Rate Last Admin   • alum-mag hydroxide+simethicone/lidocaine viscous (2:1) (MAGIC MOUTHWASH/GI COCKTAIL) (compounded) oral suspension 7.5 mL  7.5 mL Oral 4x Daily PRN Dayna Mccullough DO   7.5 mL at 02/24/21 1254   • dexamethasone (DECADRON) injection 10 mg  10 mg Intravenous Q6H Dayna Mccullough DO   10 mg at 02/24/21 1320   • sodium chloride 0.9 % flush bag 25  mL  25 mL Intravenous PRN James Salinas MD       • sodium chloride (PF) 0.9 % injection 2 mL  2 mL Intracatheter 2 times per day James Salinas MD   2 mL at 02/23/21 2202   • ondansetron (ZOFRAN ODT) disintegrating tablet 4 mg  4 mg Oral Q6H PRN James Salinas MD       • sodium chloride (PF) 0.9 % injection 0.6-4.6 mL  0.6-4.6 mL Intravenous PRN Jaylin Garcia MD       • sodium chloride (PF) 0.9 % injection 0.5-10 mL  0.5-10 mL Intravenous PRN Jaylin Garcia MD       • dextrose 5 % / sodium chloride 0.9% infusion   Intravenous Continuous Jaylin Garcia  mL/hr at 02/24/21 1049 New Bag at 02/24/21 1049   • morphine injection 2 mg  2 mg Intravenous Q4H PRN Jaylin Garcia MD   2 mg at 02/24/21 0842   • acetaminophen (TYLENOL) 160 MG/5ML suspension 650 mg  650 mg Oral Q6H PRN Rosa Kimbrough MD        Or   • HYDROcodone-acetaminophen 7.5-325 MG/15ML solution 5 mg  5 mg Oral Q6H PRN Rosa Kimbrough MD           OBJECTIVE:    VITAL SIGNS:     Vital Last Value 24 Hour Range   Temperature 98.4 °F (36.9 °C) (02/24/21 1245) Temp  Min: 96.8 °F (36 °C)  Max: 98.4 °F (36.9 °C)   Pulse 80 (02/24/21 1245) Pulse  Min: 66  Max: 124   Respiratory (!) 24 (02/24/21 1245) Resp  Min: 13  Max: 25   Non-Invasive  Blood Pressure 115/66 (02/24/21 1245) BP  Min: 102/50  Max: 130/68   Pulse Oximetry 98 % (02/24/21 1245) SpO2  Min: 95 %  Max: 100 %     Vital Today Admitted   Weight 73.9 kg (162 lb 14.7 oz) (02/23/21 1150) Weight: 73.9 kg (162 lb 14.7 oz) (02/23/21 1150)   Height N/A Height: 5' 4\" (162.6 cm) (02/22/21 1243)   Body Mass Index N/A BMI (Calculated): 27.11 (02/23/21 1150)     INTAKE/OUTPUT:      Intake/Output Summary (Last 24 hours) at 2/24/2021 1548  Last data filed at 2/24/2021 1259  Gross per 24 hour   Intake 3770 ml   Output 1186.5 ml   Net 2583.5 ml         PHYSICAL EXAM:    Physical Exam   Constitutional: No distress.   HENT:   Head: Normocephalic and atraumatic.   Patient is only able to open his mouth a few  inches. There is significant swelling of the sublingual area.   Eyes: Pupils are equal, round, and reactive to light. Conjunctivae and EOM are normal.   Neck:   AIMEE drain in place and draining serosanguinous fluid. Submandibular region is tender to palpation with swelling of the neck.   Cardiovascular: Normal rate, regular rhythm, normal heart sounds and intact distal pulses. Exam reveals no gallop and no friction rub.   No murmur heard.  Pulmonary/Chest: Effort normal and breath sounds normal. No respiratory distress. He has no wheezes. He has no rales. He exhibits no tenderness.   Abdominal: Soft. Bowel sounds are normal. He exhibits no distension and no mass. There is no abdominal tenderness. There is no rebound and no guarding.   Musculoskeletal: Normal range of motion.         General: No tenderness, deformity or edema.   Neurological: He displays normal reflexes. No cranial nerve deficit. He exhibits normal muscle tone. Coordination normal.   Skin: No rash noted. He is not diaphoretic. No erythema. No pallor.       LABORATORY DATA:    No results found for this or any previous visit (from the past 24 hour(s)).     IMAGING STUDIES:    Imaging studies reviewed.                           ASSESSMENT:     Junior Bocanegra is a 15 year old male patient admitted after intraoral resection of suspected epidermoid cyst on the floor of mouth. Patient is POD #1 from surgery. The surgery went well without complications. Patient continues to have significant mouth/throat pain and swelling of the sublingual and submandibular regions. Pain and swelling have interfered with patient's PO intake. Patient is currently stable on the pediatric floor for post-operative monitoring and pain management.    PLAN:    ENT   - ENT on consult, appreciate recs   - Decadron 10mg Q6h IV (s.2/24) for swelling  - Magic mouthwash PRN  - monitor drain output  - drain will likely be pulled tomorrow  - mass sent for surgical pathology   - FU on surgical  pathology     FENGI  - CLD and advance as tolerated to a regular, soft for dentition ground diet, ok for straws   - Will continue D5NS @ maintenance until PO intake improves  - Zofran 4mg Q6H PRN for nausea/vomiting  - Monitor I&O     ID  - S/p Ancef 2g q8hrs  - Monitor for fevers, infection     Neuro/Pain  - Tylenol 650mg Q6H for pain less that 7 OR  Hycet 7.5-325  5mg Q6H scheduled for pain greater than 7   - Morphine 2mg Q4H PRN for severe/breakthrough pain (3rd line)     CV/Resp  - Currently HDS  - Stable on RA  - Monitor VS Q4H  - Continuous pulse ox     Heme  - Will monitor for any post-op bleeding  - Would consider performing CBC if patient develops bleeding and/or tachycardia that is persistent despite pain control     Lines: PIV R. Hand   Function, necessity, and utility of all lines was discussed on rounds.     VTE: 1 for surgery, at baseline mobility, SCDs in place      Dispo: Pt to remain hospitalized while requiring observation for post operative complications.      Plan was discussed with family, nursing staff, and attending physician. All in agreement with the plan.    Sara Granados MS4  2/24/2021     No

## 2025-01-24 NOTE — PROGRESS NOTE ADULT - ASSESSMENT
Mr. Dawn is a 64 year old gentleman with significant past medical history of pancreatic cancer following with [facility] s/p Whipple/chemo/RT currently on chemotherapy which has been held in the setting of dizziness/fatigue/exertional dyspnea/ anemia for which he was being followed closely, history of DVT in the setting of lupus anti-coagulant, paroxsymal atrial fibrillation and chronic Pradaxa for at least 20 years, who presents from home after being found down on the floor of his room, patient reported standing up and feeling light headed, did not lose consciousness although admits to dizziness, decided to sit down on the ground (slowly slid down to the floor) and then describes severe weakness for which he was unable to rise or to call for help, he was down for anywhere between 20-45 minutes per recall. He was found by his wife who phoned EMS to bring him to hospital. Previous to this event patient endorses feeling at his baseline level of health. He denies recent illness. Symptoms came on suddenly without warning. He then developed perfuse bloody bowel movements for which he presents to the ED. GI asked to consult for rectal bleeding.    #rectal bleed  CT Abdomen and Pelvis w/wo IV Cont (01.18.25) No active gastrointestinal bleed. Status post Whipple procedure. Severe short segment stenosis of the portal vein and chronically occluded splenic vein with numerous upper abdominal collateral vessels.  Colonoscopy (1/24/25) showing diverticulosis, internal hemorrhoids and resolving ischemic colitis    - Hgb 7.3gm this morning, reports nonbloody BM last night and this morning   - Trend CBC, transfuse as needed. Monitor for signs of bleeding.   - Avoid NSAIDs  - PPI BID for GI mucosal cytoprotection  - OK to resume Pradaxa   - High fiber diet  - Follow up outpatient with heme/onc   - GI will sign off now. Please call us back with any questions or concern.   _________________________________________________________________  Assessment and recommendations are final when note is signed by the attending physician.

## 2025-01-24 NOTE — PROGRESS NOTE ADULT - SUBJECTIVE AND OBJECTIVE BOX
Dar Swan MD  [facility] Medicine  Contact via Teams or text/call at [phone]    Patient is a 64y old  Male who presents with a chief complaint of GI Bleeding  Symptomatic anemia (24 Jan 2025 10:08)    Feels good.  Wants to go home. hemoglobin stable.     Patient seen and examined at bedside. No overnight events reported.     ALLERGIES:  No Known Drug Allergies  latex (Rash)    MEDICATIONS  (STANDING):  chlorhexidine 2% Cloths 1 Application(s) Topical <User Schedule>  influenza   Vaccine 0.5 milliLiter(s) IntraMuscular once  magnesium oxide 400 milliGRAM(s) Oral three times a day with meals  nebivolol 10 milliGRAM(s) Oral daily  ondansetron Injectable 4 milliGRAM(s) IV Push once  pantoprazole  Injectable 40 milliGRAM(s) IV Push every 12 hours  potassium phosphate / sodium phosphate Powder (PHOS-NaK) 2 Packet(s) Oral three times a day with meals    MEDICATIONS  (PRN):    Vital Signs Last 24 Hrs  T(F): 97.9 (24 Jan 2025 10:32), Max: 99 (24 Jan 2025 00:50)  HR: 64 (24 Jan 2025 10:32) (62 - 77)  BP: 123/75 (24 Jan 2025 10:32) (123/75 - 141/79)  RR: 18 (24 Jan 2025 10:32) (17 - 19)  SpO2: 98% (24 Jan 2025 10:32) (96% - 98%)  I&O's Summary    23 Jan 2025 07:01  -  24 Jan 2025 07:00  --------------------------------------------------------  IN: 480 mL / OUT: 0 mL / NET: 480 mL      PHYSICAL EXAM:  General: NAD, A/O x 3  ENT: No gross hearing impairment, Moist mucous membranes, no thrush  Neck: Supple, No JVD  Lungs: Clear to auscultation bilaterally, good air entry, non-labored breathing  Cardio: RRR, S1/S2, No murmur  Abdomen: Soft, Nontender, Nondistended; Bowel sounds present  Extremities: No calf tenderness, No cyanosis, No pitting edema  Psych: Appropriate mood and affect    LABS:                        7.3    3.05  )-----------( 64       ( 24 Jan 2025 04:43 )             22.8     01-22    140  |  105  |  6.9  ----------------------------<  96  3.4   |  25.0  |  0.64    Ca    7.5      22 Jan 2025 05:30  Mg     1.9     01-22                                        Urinalysis Basic - ( 22 Jan 2025 05:30 )    Color: x / Appearance: x / SG: x / pH: x  Gluc: 96 mg/dL / Ketone: x  / Bili: x / Urobili: x   Blood: x / Protein: x / Nitrite: x   Leuk Esterase: x / RBC: x / WBC x   Sq Epi: x / Non Sq Epi: x / Bacteria: x        Culture - Blood (collected 20 Jan 2025 06:34)  Source: .Blood BLOOD  Preliminary Report (23 Jan 2025 13:02):    No growth at 72 Hours    Culture - Urine (collected 19 Jan 2025 03:56)  Source: Clean Catch Clean Catch (Midstream)  Final Report (20 Jan 2025 10:22):    No growth    Culture - Blood (collected 18 Jan 2025 06:21)  Source: .Blood BLOOD  Final Report (23 Jan 2025 13:01):    No growth at 5 days        RADIOLOGY & ADDITIONAL TESTS:    Care Discussed with Consultants/Other Providers:

## 2025-01-24 NOTE — PROGRESS NOTE ADULT - PROBLEM SELECTOR PROBLEM 2
Lower GI bleed
Elevated liver enzymes
Pancreatic cancer
Elevated liver enzymes

## 2025-01-24 NOTE — PROGRESS NOTE ADULT - REASON FOR ADMISSION
GI Bleeding  Symptomatic anemia
Never smoker

## 2025-01-24 NOTE — PROGRESS NOTE ADULT - TIME BILLING
Time spent reviewing the chart documentation, reviewing labs and imaging studies, evaluating the patient, discussing the plan of care with the medical team and/or all necessary consultants, and documenting.
Review of chart, discussion with patient (and wife via phone)

## 2025-01-24 NOTE — PROGRESS NOTE ADULT - PROVIDER SPECIALTY LIST ADULT
Hospitalist
Gastroenterology
Gastroenterology
Hospitalist
Surgery
Heme/Onc
Hospitalist
Surgery
Gastroenterology
Gastroenterology
Hospitalist
Gastroenterology
Heme/Onc
Heme/Onc

## 2025-01-24 NOTE — DISCHARGE NOTE PROVIDER - NSDCCPCAREPLAN_GEN_ALL_CORE_FT
PRINCIPAL DISCHARGE DIAGNOSIS  Diagnosis: Lower GI bleed  Assessment and Plan of Treatment: You were admitted for GI bleed, you underwent a colonoscopy which revealed diverticulosis without bleeding and no evidence of ishemic colitis. You will need to get your hemoglobin checked next week with your oncologist.  Please return to ER if you have bloody or black stool.   You will need to follow up with your primary care physician.

## 2025-01-24 NOTE — PROGRESS NOTE ADULT - NS ATTEND AMEND GEN_ALL_CORE FT
Above assessment noted.  The patient was seen and examined by myself with the surgical PA.  The patient is without abdominal pain, nausea, or vomit.  The patient denies any further bleeding from below. Abdomen is soft without localizing tenderness, no guarding, no rebound.  Monitor H/H, doubt small bowel obstruction, likely GI bleed as primary event.  Will continue to follow.
Patient is a64 yo M with PMH pancreatic cancer on treatment who presented with symptomatic anemia. He is s/p colonoscopy for bloody bowel movements which showed no active bleeding with diverticulosis, internal hemorrhoids and resolving ischemic colitis. Hgb table from overnight and patient has had 2 NONbloody BMs. Patient stable at this time. To follow up with oncology/hematology for blood work with his next visit. To return to PCP as well.
63yo male s/p whipple for Pancreatic CA and history of lupus anticoagulation disorder on Pradaxa was admitted to medicine for GIB. ACS was consulted for concern for bowel obstruction on CT scan. Clinically pt has been pass flatus, no N/V. Denied any abdominal pain. Prior to presentation. He has been tolerating diet. On exam, abdomen soft, nondistended, and nontender. Clinically presentation and examination is not consistent with bowel obstruction.    -ACS will sign off, please reconsult PRN.  -GI consult for GIB was noted
Mr. Dawn is a 64 year old gentleman who presents with acute onset hypotension and near syncope followed by several large bloody bowel movements (since resolved) likely consistent with ischemic injury, additionally resultant ischemic hepatopathy, ileus s/p NGT, improved, No role for emergent Colonoscopy at this time however given no previous evaluation should undergo screening colonoscopy (albeit can be completed outpatient). Hemoglobin remains stable. No further episodes of bleeding. Patient ambulating and denies feeling lightheaded. May need additional transfusion given acute on chronic anemia for which he was receiving transfusion in the past. Also agree with gentle advancement of PO diet. If clinical trajectory continues could consider re-initiation of Pradaxa and discharge home with close outpatient follow up with [facility] and GI. Discussed with patient and his wife at bedside. Continue supportive care. We will continue to follow along with you.
Patient hemoglobin stable now at 8   Had BM with clots  cbc  ordered in the am    I spoke to Dr Guo- inquiring regarding pt anticoagulation. Will plan for colonoscopy tomorrow. Prep ordered  I discussed the risks and benefits of colonoscopy including, but not limited to bleeding infection perforation    increased LFT  - shock liver  - LFT improving
hemoglobin dropped to 6.6. However, pt has not had a BM since before the colonoscopy yesterday  tolerating solid diet      + BS, soft, non tender   give 1 unit PRBC and check post transfusion hemoglobin   colon showed left side erythema , diverticulosis, hemorrhoids

## 2025-01-24 NOTE — PROGRESS NOTE ADULT - SUBJECTIVE AND OBJECTIVE BOX
Chief Complaint:  Patient is a 64y old  Male who presents with a chief complaint of GI BleedingSymptomatic anemia (2025 10:50)    HPI/ 24 hr events: Patient seen and examined at bedside. Patient feeling well this morning. Tolerating diet. Reports 2 nonbloody BM yesterday and this morning. Denies nausea, vomiting, abdominal pain, hematemesis, hematochezia, melena. Vitals are overall stable, Hgb 7.3gm this morning.    REVIEW OF SYSTEMS:   General: Negative  HEENT: Negative  CV: Negative  Respiratory: Negative  GI: See HPI  : Negative  MSK: Negative  Hematologic: Negative  Skin: Negative    MEDICATIONS:   MEDICATIONS  (STANDING):  chlorhexidine 2% Cloths 1 Application(s) Topical <User Schedule>  influenza   Vaccine 0.5 milliLiter(s) IntraMuscular once  magnesium oxide 400 milliGRAM(s) Oral three times a day with meals  nebivolol 10 milliGRAM(s) Oral daily  ondansetron Injectable 4 milliGRAM(s) IV Push once  pantoprazole  Injectable 40 milliGRAM(s) IV Push every 12 hours  potassium phosphate / sodium phosphate Powder (PHOS-NaK) 2 Packet(s) Oral three times a day with meals    MEDICATIONS  (PRN):      Packed Red Cells Order:  1 Unit  Indication: Hgb <7 gm/dL  Infuse Unit : 2 Hours (25 @ 11:21)  Packed Red Cells Order:  1 Unit; Irradiated  Indication: Anemia due to Active Hemorrhage - Medical Conditions e.   Indications for Irradiated: Aggressive (Hematologic) chemotherapy  Infuse Unit : 3 Hours (25 @ 09:33)  Packed Red Cells Order:  1 Unit  Indication: Anemia due to Active Hemorrhage - Medical Conditions e.  Infuse Unit : 1 Hour (25 @ 08:52)  Packed Red Cells Order:  1 Unit  Indication: Anemia due to Active Hemorrhage - Medical Conditions e.  Infuse Unit : 1 Hour (25 @ 08:52)        DIET:  Diet, Regular (25 @ 17:10) [Active]          ALLERGIES:   Allergies    No Known Drug Allergies  latex (Rash)    Intolerances        VITAL SIGNS:   Vital Signs Last 24 Hrs  T(C): 36.6 (2025 10:32), Max: 37.2 (2025 16:35)  T(F): 97.9 (2025 10:32), Max: 99 (2025 00:50)  HR: 64 (2025 10:32) (62 - 77)  BP: 123/75 (2025 10:32) (123/75 - 141/79)  BP(mean): 100 (2025 18:13) (94 - 100)  RR: 18 (2025 10:32) (17 - 19)  SpO2: 98% (2025 10:32) (96% - 98%)    Parameters below as of 2025 10:32  Patient On (Oxygen Delivery Method): room air      I&O's Summary    2025 07:01  -  2025 07:00  --------------------------------------------------------  IN: 480 mL / OUT: 0 mL / NET: 480 mL        PHYSICAL EXAM:   GENERAL:  No acute distress  HEENT:  NC/AT, conjunctiva clear, sclera anicteric  CHEST:  No increased effort  HEART:  Regular rate  ABDOMEN:  Soft, non-tender, non-distended, normoactive bowel sounds, no rebound or guarding  EXTREMITIES: No edema  SKIN:  Warm, dry  NEURO:  Calm, cooperative    LABS:                        7.3    3.05  )-----------( 64       ( 2025 04:43 )             22.8     Hemoglobin: 7.3 g/dL (25 @ 04:43)  Hemoglobin: 7.8 g/dL (25 @ 20:30)  Hemoglobin: 6.6 g/dL (25 @ 09:54)  Hemoglobin: 7.7 g/dL (25 @ 05:30)  RADIOLOGY & ADDITIONAL STUDIES:          Colonoscopy Report        Date: 2025        Patient Name: CARLOS APONTE        MRN: [id number]        Account Number:        [id number]        Gender: Male         (age): 1960 (64)        Instrument(s):        [facility] [id number]        Attending/Fellow:        Anaya Anthony MD, DO                Procedure Room #:        PROCEDURE ROOM 1                        ASA Class:        P3 - 2025 5:00 PM Anaya Anthony MD, DO        Administered Medications:        As per Anesthesiology Record        Indications:        Rectal bleedin.3 - K62.5        Procedure:        This is a average risk patient  undergoing a diagnostic colonoscopy.        The procedure, indications, preparation and potential complications were    explained to the patient, who indicated understanding and signed the    corresponding consent forms. MAC was administered by the anesthesiologist.    Continuous pulse oximetry and blood pressure monitoring were used throughout the    procedure. Supplemental oxygenwas used. The quality of preparation was 6-9 on    the BBP scale/adequate. Patient was placed in left lateral decubitus position.    Digital exam was normal. The colonoscope was introduced through the rectum and    advanced under direct visualizationuntil cecum was reached. The appendiceal    orifice and the ileocecal valve were identified. The terminal ileum was    identified. Careful visualization was performed as the instrument was withdrawn.    Retroflexion in the rectum was performed successfully and there were no grade    III hemorrhoids. Patient tolerance to procedure was excellent. The procedure was    somewhat difficult. Blood loss was none..        [place] Bowel Preparation Score:        Using the [place] Bowel Preparation Score, each segment of the colon was graded    as follows:        Left Colon: Good, 2 and Good, 2 | Transverse Colon: Good, 2  | Right Colon:    Good, 2        Limitations/Complications:        There were no apparent limitations or complications        Findings:        Excavated lesions Several non-bleeding diverticula were seen in the sigmoid    colon. Diverticulosis appeared to be of moderate severity.        Protruding lesions Medium grade/stage III internal hemorrhoids were noted.        Additional findings -Mild erythema of left colon . Maybe resolving ischemic    colitis.        Impressions:        Grade/Stage III internal hemorrhoids.        Moderate diverticulosis of the sigmoid colon.        -Mild erythema of left colon. Maybe resolving ischemic colitis.        Plan:        Discharge to home        High Fiber Diet        Additional Notes:        May restart plavix        regular diet        Anaya Anthony MD, DO        Version 1, Electronically signed on 2025 5:04:35 PM by Anaya Anthony MD, DO

## 2025-01-24 NOTE — DISCHARGE NOTE NURSING/CASE MANAGEMENT/SOCIAL WORK - NSDPDISTO_GEN_ALL_CORE
Home
31M c/o headache, body ache, fever, mild cough, x 2 days.  tried tylenol with some improvement but came back in 2 hrs.  HA to the point he can't open his eyes.  Pt reports subacute "bubbles" like rash to groin x months, still having them but they are better now.  (+)dysuria.  Pt reports testicles pain over the past few months but not too bad at present.  Did not see a doctor for it.  Pt just got health insurance since yesterday.  PMHX - none meds - none  pSHX - none.  Former smoker.  All - NKA.  No vomiting.  No diarrhea.  No abd pain.  Some chest discomfort, worse with breathing/coughing.  No foreign travel.  Pt is a student, no sick contact.  Likely viral syndrome.  Pt without meningismus, normal mental status and neuro exam; unlikely meningitis.  Small bumps to R inguinal area no vesicles seen, pt shaves, possibly shave bumps, less likely herpes; pt does endorse sexually active, will send Gc/chlam.  Check labs, CXR, EKG r/o myopericarditis (unlikely), rx symptomatic tx, likely d/c home f/u PMD.

## 2025-01-24 NOTE — DISCHARGE NOTE PROVIDER - HOSPITAL COURSE
Hospital Course  HPI:  64 year old male with hypertension, atrial fibrillation, lupus anticoagulant and pancreatic cancer s/p Whipple's, XRT and currently on chemotherapy which was held last month presents with dizziness, fatigue and exertional dyspnea when he awoke this morning associated with bloody bowel movements.  Denies any nausea, vomiting or abdominal pain. Bowel movement previously 'abstract' with light to dark brown stool. Denies any hematemesis chest pain, palpitations, fever, chills, hematemesis.    Noted have anemia, received 2L NaCl, Praxbind, PipTaz, Acetaminophen, Pantoprazole and PRBC x 2 in ER.  Feels better now (18 Jan 2025 15:00)    You were admitted for GI bleed, you underwent a colonoscopy which revealed diverticulosis without bleeding and no evidence of ishemic colitis. You will need to get your hemoglobin checked next week with your oncologist.    Please return to ER if you have bloody or black stool.     You will need to follow up with your primary care physician.    Discharging Provider:  Dar Swan MD  Contact Info: [phone] - Please call with any questions or concerns.

## 2025-01-24 NOTE — PROGRESS NOTE ADULT - ASSESSMENT
64 year old male with hypertension, atrial fibrillation, lupus anticoagulant and pancreatic cancer s/p Whipple's, XRT and currently on chemotherapy which was held last month presents with dizziness, fatigue and exertional dyspnea when he awoke this morning associated with bloody bowel movements.  Hypotensive in ER responding to hydration; Anemia (Hgb 8.5) and PRBC x 2 in addition to Praxbind given.   Also received PipTaz, Acetaminophen and Pantoprazole 80mg IV    Acute Blood Loss Anemia, Symptomatic  GI Bleed, likely ischemic colitis  - negative for overt bleeding yesterady  - hgb stable 7.3 this AM  - had brown stool last night, no blood noted   - drop likely secondary to post colonoscopy and lag from prior bleeding  - continue protonix  - restart pradaxa 1/23/25    Shock Liver  - trend CMP  - improving slowly    Atrial Fibrillation  Hypertension  - Bystolic with parameters for rate control; Hold home Amlodipine, HCTZ  - restart pradaxa after colonoscopy    History of pancreatic cancer  - Follow up with [facility] post discharge    VTE for now  Ambulate ad lb    Disposition - d/c home

## 2025-01-24 NOTE — PROGRESS NOTE ADULT - SUBJECTIVE AND OBJECTIVE BOX
64 year old male who follows Dr Eller at [facility] [place] cancer s/p Whipple's, XRT and currently on chemotherapy which was held last month presents with dizziness, fatigue and exertional dyspnea when he awoke this morning associated with bloody bowel movements. Hypotensive in ER responding to hydration; Anemia (Hgb 8.5) and PRBC x 2 in addition to Praxbind given. Also received PipTaz, Acetaminophen and Pantoprazole 80mg IV    01/22/2025: clinically, he says that he feels fine. No n/v/d. No pain. No dizziness. Going to for colonoscopy today. Eager to go home.    1/24:  drop in hgb 6.6 s/p 1u PRBC, with improvement to 7.8 but this am Hgb lower 7.3, had 2 BM non bloody        MEDICATIONS  (STANDING):  chlorhexidine 2% Cloths 1 Application(s) Topical <User Schedule>  influenza   Vaccine 0.5 milliLiter(s) IntraMuscular once  magnesium oxide 400 milliGRAM(s) Oral three times a day with meals  nebivolol 10 milliGRAM(s) Oral daily  ondansetron Injectable 4 milliGRAM(s) IV Push once  pantoprazole  Injectable 40 milliGRAM(s) IV Push every 12 hours  potassium phosphate / sodium phosphate Powder (PHOS-NaK) 2 Packet(s) Oral three times a day with meals      MEDICATIONS  (PRN):    ICU Vital Signs Last 24 Hrs  T(C): 36.9 (24 Jan 2025 05:44), Max: 37.2 (23 Jan 2025 16:35)  T(F): 98.4 (24 Jan 2025 05:44), Max: 99 (24 Jan 2025 00:50)  HR: 63 (24 Jan 2025 05:44) (62 - 77)  BP: 126/70 (24 Jan 2025 05:44) (126/70 - 141/79)  BP(mean): 100 (23 Jan 2025 18:13) (94 - 100)  ABP: --  ABP(mean): --  RR: 17 (24 Jan 2025 05:44) (17 - 19)  SpO2: 97% (24 Jan 2025 05:44) (96% - 98%)    O2 Parameters below as of 24 Jan 2025 05:44  Patient On (Oxygen Delivery Method): room air      PE:   NAD  CTAB  RRR  ABD soft, nd, nt  A/O x 3    Labs:    CBC                          7.3    3.05  )-----------( 64       ( 24 Jan 2025 04:43 )             22.8                           7.7    5.36  )-----------( 74       ( 22 Jan 2025 05:30 )             24.6                           7.7    5.36  )-----------( 74       ( 22 Jan 2025 05:30 )             24.6                           8.1    10.89 )-----------( 95       ( 21 Jan 2025 03:44 )             25.2     CHEM        01-22    140  |  105  |  6.9[L]  ----------------------------<  96  3.4[L]   |  25.0  |  0.64    Ca    7.5[L]      22 Jan 2025 05:30  Mg     1.9     01-22    TPro  5.7[L]  /  Alb  2.9[L]  /  TBili  2.0  /  DBili  x   /  AST  466[H]  /  ALT  1093[H]  /  AlkPhos  126[H]  01-21 01-21    138  |  102  |  11.4  ----------------------------<  104[H]  3.7   |  24.0  |  0.69    Ca    7.9[L]      21 Jan 2025 03:44  Phos  1.8     01-20  Mg     1.6     01-20    TPro  5.7[L]  /  Alb  2.9[L]  /  TBili  2.0  /  DBili  x   /  AST  466[H]  /  ALT  1093[H]  /  AlkPhos  126[H]  01-21

## 2025-01-24 NOTE — DISCHARGE NOTE PROVIDER - NSDCMRMEDTOKEN_GEN_ALL_CORE_FT
Bystolic 10 mg oral tablet: 1 tab(s) orally once a day  hydroCHLOROthiazide 25 mg oral tablet: 1 tab(s) orally once a day  Norvasc 2.5 mg oral tablet: 1 tab(s) orally once a day  pancrelipase 24,000 units-76,000 units-120,000 units oral delayed release capsule: 1 cap(s) orally 3 times a day  potassium chloride 20 mEq oral tablet, extended release: 1 tab(s) orally once   Pradaxa 150 mg oral capsule: 1 cap(s) orally 2 times a day

## 2025-01-25 LAB
CULTURE RESULTS: SIGNIFICANT CHANGE UP
SPECIMEN SOURCE: SIGNIFICANT CHANGE UP

## 2025-04-10 NOTE — ED PROVIDER NOTE - NSTIMEPROVIDERCAREINITIATE_GEN_ER
[___ / 5] : Visuospatial / Executive: [unfilled] / 5 [0 / 0] : Memory: 0 / 0 [___ / 3] : Attention (Serial 7 subtraction): [unfilled] / 3 [___ / 1] : Fluency: [unfilled] / 1 [___ / 2] : Abstraction: [unfilled] / 2 [___ / 5] : Delayed Recall: [unfilled] / 5 [___ / 6] : Orientation: [unfilled] / 6 [FreeTextEntry1] : GENERAL PHYSICAL EXAM: GEN: no distress, normal affect EYES: sclera white, conjunctiva clear, no nystagmus CV: normal rhythm PULM: no respiratory distress, normal rhythm and effort EXT: no edema, no cyanosis SKIN: warm, dry, no rash or lesion on exposed skin   NEUROLOGICAL EXAM: Mental Status Orientation: alert and oriented to person, place, time, and situation Language: clear and fluent, intact comprehension and repetition   Cranial Nerves II: visual fields full to confrontation III, IV, VI: PERRL, EOMI V, VII: facial sensation and movement intact and symmetric VIII: hearing intact IX, X: uvula midline, soft palate elevates normally XI: BL shoulder shrug intact XII: tongue midline   Motor Shoulder abd: 5 (R), 5 (L) EF/EE: 5 (R), 5 (L) WF/WE: 5 (R), 5 (L) hand : 5 (R), 5 (L) HF/HE: 5 (R), 5 (L) KF/KE: 5 (R), 5 (L) DF/PF: 5 (R), 5 (L) Tone and bulk are normal in upper and lower limbs No pronator drift   Sensation Intact to light touch in all 4 EXTs   Reflex 2+ in BL biceps, brachioradialis, patella   Coordination Normal FTN bilaterally Able to perform rapid, alternating movements   Gait Normal stance, stride, and pivot turn Unable to perform Tandem walk Negative Romberg  [MocaTotal] : 16 08-May-2018 10:09

## 2025-04-24 NOTE — ED ADULT TRIAGE NOTE - ARRIVAL FROM
Andrew Cantor is a 54 y.o. male has been receiving hyperbaric oxygen treatment for management of:Indication  Indications: Late Effect of Radiation (Osteoradionecrosis;Soft tissue radionecrosis). Mr. Cantor has completed Treatment Number: 19 out of a treatment protocol of Total Treatments: 30.    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    Pt is tolerating hyperbaric oxygen therapy  well without complications.         Ashley Sims RN  4/24/2025  10:22 AM  
Home

## (undated) DEVICE — TUBING ALARIS PUMP MODULE NON-DEHP

## (undated) DEVICE — BASIN EMESIS 10IN GRADUATED MAUVE

## (undated) DEVICE — SOL BAG NS 0.9% 1000ML

## (undated) DEVICE — SYR LUER SLIP TIP 50CC

## (undated) DEVICE — SYR IV FLUSH SALINE 10ML 30/TY

## (undated) DEVICE — SOL IRR BAG H2O 1000ML

## (undated) DEVICE — VENODYNE/SCD SLEEVE CALF MEDIUM

## (undated) DEVICE — DRSG 2X2

## (undated) DEVICE — FORCEP RADIAL JAW 4 W NDL 2.4MM 2.8MM 240CM ORANGE DISP

## (undated) DEVICE — SENSOR O2 FINGER ADULT

## (undated) DEVICE — DENTURE CUP PINK

## (undated) DEVICE — ELCTR REM POLYHESIVE ADULT PT RETURN 15FT

## (undated) DEVICE — OMNIPAQUE 300  30ML

## (undated) DEVICE — UNDERPAD LINEN SAVER 23 X 36"

## (undated) DEVICE — STERIS DEFENDO 3-PIECE KIT (AIR/WATER, SUCTION & BIOPSY VALVES)

## (undated) DEVICE — GOWN IMPERV XL

## (undated) DEVICE — MASK PROCEDURE EARLOOP LVL 2 50/BX

## (undated) DEVICE — SYR SLIP 10CC

## (undated) DEVICE — MASK PROC EAR LOOP

## (undated) DEVICE — SOL IRR BAG NS 0.9% 1000ML

## (undated) DEVICE — NDL ASPIRATION EZ SHOT 22G SNGL USE

## (undated) DEVICE — KIT DEFENDO 4 OLY 4 PC

## (undated) DEVICE — DRSG CURITY GAUZE SPONGE 4 X 4" 12-PLY NON-STERILE

## (undated) DEVICE — CATH IV SAFE BC 22G X 1" (BLUE)

## (undated) DEVICE — PACK IV START WITH CHG

## (undated) DEVICE — TUBING IV EXTENSION MACRO W CLAVE 7"

## (undated) DEVICE — WARMING BLANKET FULL ADULT

## (undated) DEVICE — SSH-EBUS LINEAR 1311057: Type: DURABLE MEDICAL EQUIPMENT

## (undated) DEVICE — DVC CLEVERLOCK